# Patient Record
Sex: MALE | Race: WHITE | Employment: FULL TIME | RURAL
[De-identification: names, ages, dates, MRNs, and addresses within clinical notes are randomized per-mention and may not be internally consistent; named-entity substitution may affect disease eponyms.]

---

## 2018-01-30 ENCOUNTER — OFFICE VISIT (OUTPATIENT)
Dept: FAMILY MEDICINE CLINIC | Age: 48
End: 2018-01-30

## 2018-01-30 VITALS
HEART RATE: 59 BPM | TEMPERATURE: 98.7 F | WEIGHT: 252.4 LBS | RESPIRATION RATE: 18 BRPM | BODY MASS INDEX: 34.19 KG/M2 | DIASTOLIC BLOOD PRESSURE: 75 MMHG | SYSTOLIC BLOOD PRESSURE: 117 MMHG | HEIGHT: 72 IN | OXYGEN SATURATION: 96 %

## 2018-01-30 DIAGNOSIS — M25.511 ACUTE PAIN OF RIGHT SHOULDER: Primary | ICD-10-CM

## 2018-01-30 DIAGNOSIS — N13.8 BPH WITH OBSTRUCTION/LOWER URINARY TRACT SYMPTOMS: ICD-10-CM

## 2018-01-30 DIAGNOSIS — M54.12 CERVICAL RADICULOPATHY AT C7: ICD-10-CM
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
## 2018-01-30 DIAGNOSIS — N40.1 BPH WITH OBSTRUCTION/LOWER URINARY TRACT SYMPTOMS: ICD-10-CM

## 2018-01-30 RX ORDER — PREDNISONE 20 MG/1
TABLET ORAL
Qty: 30 TAB | Refills: 0 | Status: SHIPPED | OUTPATIENT
Start: 2018-01-30 | End: 2018-03-29

## 2018-01-30 RX ORDER — FINASTERIDE 5 MG/1
5 TABLET, FILM COATED ORAL DAILY
Qty: 30 TAB | Refills: 5 | Status: SHIPPED | OUTPATIENT
Start: 2018-01-30 | End: 2018-07-19 | Stop reason: SDUPTHER

## 2018-01-30 RX ORDER — NAPROXEN 500 MG/1
500 TABLET ORAL 2 TIMES DAILY WITH MEALS
Qty: 60 TAB | Refills: 5 | Status: SHIPPED | OUTPATIENT
Start: 2018-01-30 | End: 2018-03-29

## 2018-01-30 RX ORDER — TAMSULOSIN HYDROCHLORIDE 0.4 MG/1
0.4 CAPSULE ORAL DAILY
Qty: 30 CAP | Refills: 5 | Status: SHIPPED | OUTPATIENT
Start: 2018-01-30 | End: 2018-07-19 | Stop reason: SDUPTHER

## 2018-01-30 NOTE — PROGRESS NOTES
Asia Meyer is a 41 y.o. male who presents with the following:  Chief Complaint   Patient presents with    Shoulder Pain     right     Cervical Pain    Urinary Frequency       Shoulder Pain    The history is provided by the patient (Patient developed shoulder pain after syncope from dehydration and falling at work landing on the right shoulder on December 5, 2017). The injury mechanism was a fall. The right shoulder is affected. The pain is at a severity of 5/10. The pain is moderate. The pain has been worsening since onset. The pain does not radiate. Pertinent negatives include no tingling. Cervical Pain   The history is provided by the patient (The patient has been having cervical pain since the fall and originally was diagnosed as having a pinched nerve in his neck and placed on prednisone and the neck is now feeling a bit better although he still has pain on the right-hand side). Pertinent negatives include no chest pain, no abdominal pain, no headaches and no shortness of breath. Urinary Frequency    The history is provided by the patient (Patient is been having trouble voiding as well as urinary frequency and urgency and sometimes dribbling before after urination and sometimes not making it.). The problem occurs intermittently. The problem has not changed since onset. Associated symptoms include frequency, hesitancy and urgency. Pertinent negatives include no chills, no sweats, no nausea, no vomiting, no discharge, no hematuria, no flank pain, no penile discharge, no abdominal pain and no back pain. No Known Allergies    Current Outpatient Prescriptions   Medication Sig    finasteride (PROSCAR) 5 mg tablet Take 1 Tab by mouth daily.  tamsulosin (FLOMAX) 0.4 mg capsule Take 1 Cap by mouth daily. 20 minutes after the same meal each day    naproxen (NAPROSYN) 500 mg tablet Take 1 Tab by mouth two (2) times daily (with meals).     predniSONE (DELTASONE) 20 mg tablet 5 tablets day for days 1 through 4, then 4 tablets on day 5, then 3 tablets on day 6, then 2 tablets on day 7, then 1 tablet on day 8. No current facility-administered medications for this visit. Past Medical History:   Diagnosis Date    Acid reflux     Fainting spell     Joint pain     Muscle ache     Muscle pain     Muscle weakness     Sinus problem     Urine troubles        No past surgical history on file. Family History   Problem Relation Age of Onset    No Known Problems Mother     Heart Attack Father     No Known Problems Sister     No Known Problems Brother     No Known Problems Maternal Grandmother     No Known Problems Maternal Grandfather     Heart Failure Paternal Grandmother     Cancer Paternal Grandfather      esophagus    No Known Problems Other        Social History     Social History    Marital status:      Spouse name: Aditya Malik Number of children: N/A    Years of education: N/A     Occupational History          [employer]     Social History Main Topics    Smoking status: Never Smoker    Smokeless tobacco: Never Used    Alcohol use No    Drug use: No    Sexual activity: Yes     Partners: Female     Other Topics Concern     Service No     Social History Narrative    None       Review of Systems   Constitutional: Negative for chills, fever, malaise/fatigue and weight loss. HENT: Negative for congestion, hearing loss, sore throat and tinnitus. Eyes: Negative for blurred vision, pain and discharge. Respiratory: Negative for cough, shortness of breath and wheezing. Cardiovascular: Negative for chest pain, palpitations, orthopnea, claudication and leg swelling. Gastrointestinal: Negative for abdominal pain, constipation, heartburn, nausea and vomiting. Genitourinary: Positive for frequency, hesitancy and urgency. Negative for dysuria, flank pain, hematuria and penile discharge.    Musculoskeletal: Positive for joint pain (Over the [facility] joint on the right) and neck pain. Negative for back pain, falls and myalgias. Skin: Negative for itching and rash. Neurological: Negative for dizziness, tingling, tremors and headaches. Endo/Heme/Allergies: Negative for environmental allergies and polydipsia. Psychiatric/Behavioral: Negative for depression and substance abuse. The patient is not nervous/anxious. Visit Vitals    /75 (BP 1 Location: Left arm, BP Patient Position: Sitting)    Pulse (!) 59    Temp 98.7 °F (37.1 °C) (Oral)    Resp 18    Ht 6' (1.829 m)    Wt 252 lb 6.4 oz (114.5 kg)    SpO2 96%    BMI 34.23 kg/m2     Physical Exam   Constitutional: He is oriented to person, place, and time and well-developed, well-nourished, and in no distress. HENT:   Head: Normocephalic and atraumatic. Nose: Nose normal.   Mouth/Throat: Oropharynx is clear and moist.   Eyes: Conjunctivae and EOM are normal. Pupils are equal, round, and reactive to light. Neck: Normal range of motion. Neck supple. No JVD present. No tracheal deviation present. No thyromegaly present. Cardiovascular: Normal rate, regular rhythm, normal heart sounds and intact distal pulses. Exam reveals no gallop and no friction rub. No murmur heard. Pulmonary/Chest: Effort normal and breath sounds normal. No respiratory distress. He has no wheezes. He has no rales. He exhibits no tenderness. Abdominal: Soft. Bowel sounds are normal. He exhibits no distension and no mass. There is no tenderness. There is no rebound and no guarding. Genitourinary: Rectum normal and penis normal. No discharge found. Genitourinary Comments: Prostate is smooth and enlarged and nontender   Musculoskeletal: Normal range of motion. He exhibits tenderness (Patient is tender over the distal clavicle and the [facility] joint on the right as well as the deltoid bursa on the right). He exhibits no edema.    Patient is tender on the right in the area of the C6 and C7 nerves   Lymphadenopathy: He has no cervical adenopathy. Neurological: He is alert and oriented to person, place, and time. He has normal reflexes. No cranial nerve deficit. He exhibits normal muscle tone. Gait normal. Coordination normal.   The patient originally after the fall had numbness in the third and fourth digits on the right but this is cleared up. Skin: Skin is warm and dry. No rash noted. No erythema. Psychiatric: Mood, memory, affect and judgment normal.   Vitals reviewed. ICD-10-CM ICD-9-CM    1. Acute pain of right shoulder M25.511 719.41 XR SHOULDER RT AP/LAT MIN 2 V      XR CLAVICLE RT      naproxen (NAPROSYN) 500 mg tablet      predniSONE (DELTASONE) 20 mg tablet   2. BPH with obstruction/lower urinary tract symptoms N40.1 600.01 finasteride (PROSCAR) 5 mg tablet    N13.8 599.69 tamsulosin (FLOMAX) 0.4 mg capsule   3. Cervical radiculopathy at C7 M54.12 723.4 naproxen (NAPROSYN) 500 mg tablet      predniSONE (DELTASONE) 20 mg tablet   4. Fall, initial encounter [address] XXXA E888.9 XR SHOULDER RT AP/LAT MIN 2 V      XR CLAVICLE RT      naproxen (NAPROSYN) 500 mg tablet       Orders Placed This Encounter    XR SHOULDER RT AP/LAT MIN 2 V     Standing Status:   Future     Standing Expiration Date:   2/28/2019     Order Specific Question:   Reason for Exam     Answer:   Patient had syncopal episode at work from dehydration falling on his right shoulder and having gradually increasing pain in the area since the fall on December 5, 2017    XR CLAVICLE RT     Standing Status:   Future     Standing Expiration Date:   2/28/2019     Order Specific Question:   Reason for Exam     Answer:   Patient had fall on right shoulder on December 5, 2017 with increasing pain in the distal aspect of the right clavicle and AC joint    finasteride (PROSCAR) 5 mg tablet     Sig: Take 1 Tab by mouth daily. Dispense:  30 Tab     Refill:  5    tamsulosin (FLOMAX) 0.4 mg capsule     Sig: Take 1 Cap by mouth daily.  20 minutes after the same meal each day     Dispense:  30 Cap     Refill:  5    naproxen (NAPROSYN) 500 mg tablet     Sig: Take 1 Tab by mouth two (2) times daily (with meals). Dispense:  60 Tab     Refill:  5    predniSONE (DELTASONE) 20 mg tablet     Si tablets day for days 1 through 4, then 4 tablets on day 5, then 3 tablets on day 6, then 2 tablets on day 7, then 1 tablet on day 8. Dispense:  30 Tab     Refill:  0   I would like to see the patient back in about 4 weeks if things are not improving or sooner if they are worsening so I can get him into physical therapy. I told the patient that this condition will get worse if he does not do his daily exercises at home to stretch everything out and he could freeze his shoulder and as little as 2 weeks. I told the patient that his the was having lower urinary tract symptoms from his prostate and suggested Proscar and tamsulosin but down the road he still might need other medications to help with this.     Follow-up Disposition: Not on Sara Nelson MD

## 2018-01-30 NOTE — MR AVS SNAPSHOT
[address] [place] [address] [phone] Patient: Daria Ott 
MRN: [id number] :1970 Visit Information Date & Time Provider Department Dept. Phone Encounter #  
 2018  4:00 PM Capri Sargent MD [address] [place] [address] [id number] Upcoming Health Maintenance Date Due DTaP/Tdap/Td series (2 - Td) 2028 Allergies as of 2018  Review Complete On: 2018 By: Capri Sargent MD  
 No Known Allergies Current Immunizations  Never Reviewed No immunizations on file. Not reviewed this visit You Were Diagnosed With   
  
 Codes Comments Acute pain of right shoulder    -  Primary ICD-10-CM: M25.511 ICD-9-CM: 719.41   
 BPH with obstruction/lower urinary tract symptoms     ICD-10-CM: N40.1, N13.8 ICD-9-CM: 600.01, 599.69 Cervical radiculopathy at C7     ICD-10-CM: M54.12 
ICD-9-CM: 723.4 Fall, initial encounter     ICD-10-CM: W19. Khoi Caprice ICD-9-CM: E888.9 Vitals BP Pulse Temp Resp Height(growth percentile) Weight(growth percentile) 117/75 (BP 1 Location: Left arm, BP Patient Position: Sitting) (!) 59 98.7 °F (37.1 °C) (Oral) 18 6' (1.829 m) 252 lb 6.4 oz (114.5 kg) SpO2 BMI Smoking Status 96% 34.23 kg/m2 Never Smoker Vitals History BMI and BSA Data Body Mass Index Body Surface Area  
 34.23 kg/m 2 2.41 m 2 Preferred Pharmacy Pharmacy Name Phone [address] [phone] Your Updated Medication List  
  
   
This list is accurate as of: 18  5:53 PM.  Always use your most recent med list.  
  
  
  
  
 finasteride 5 mg tablet Commonly known as:  PROSCAR Take 1 Tab by mouth daily. naproxen 500 mg tablet Commonly known as:  NAPROSYN Take 1 Tab by mouth two (2) times daily (with meals). predniSONE 20 mg tablet Commonly known as:  DELTASONE  
5 tablets day for days 1 through 4, then 4 tablets on day 5, then 3 tablets on day 6, then 2 tablets on day 7, then 1 tablet on day 8.  
  
 tamsulosin 0.4 mg capsule Commonly known as:  FLOMAX Take 1 Cap by mouth daily. 20 minutes after the same meal each day Prescriptions Sent to Pharmacy Refills  
 finasteride (PROSCAR) 5 mg tablet 5 Sig: Take 1 Tab by mouth daily. Class: Normal  
 Pharmacy: [facility] [address] [place] [address] Ph #: [phone] Route: Oral  
 tamsulosin (FLOMAX) 0.4 mg capsule 5 Sig: Take 1 Cap by mouth daily. 20 minutes after the same meal each day Class: Normal  
 Pharmacy: [facility] [address] [place] [address] Ph #: [phone] Route: Oral  
 naproxen (NAPROSYN) 500 mg tablet 5 Sig: Take 1 Tab by mouth two (2) times daily (with meals). Class: Normal  
 Pharmacy: [facility] [address] [place] [address] [address] Ph #: [phone] Route: Oral  
 predniSONE (DELTASONE) 20 mg tablet 0 Si tablets day for days 1 through 4, then 4 tablets on day 5, then 3 tablets on day 6, then 2 tablets on day 7, then 1 tablet on day 8. Class: Normal  
 Pharmacy: [facility] [address] [place] [address] [address] Ph #: [phone] To-Do List   
 2018 Imaging:  XR CLAVICLE RT   
  
 2018 Imaging:  XR SHOULDER RT AP/LAT MIN 2 V Introducing [facility]! Cynthia Cabrales introduces [employer] patient portal. Now you can access parts of your medical record, email your doctor's office, and request medication refills online. 1. In your internet browser, go to [email or web address] 2. Click on the First Time User? Click Here link in the Sign In box. You will see the New Member Sign Up page. 3. Enter your [employer] Access Code exactly as it appears below.  You will not need to use this code after youve completed the sign-up process. If you do not sign up before the expiration date, you must request a new code. · [employer] Access Code: [email or web address] Expires: 4/30/2018  5:53 PM 
 
4. Enter the last four digits of your Social Security Number (xxxx) and Date of Birth (mm/dd/yyyy) as indicated and click Submit. You will be taken to the next sign-up page. 5. Create a [employer] ID. This will be your [employer] login ID and cannot be changed, so think of one that is secure and easy to remember. 6. Create a [employer] password. You can change your password at any time. 7. Enter your Password Reset Question and Answer. This can be used at a later time if you forget your password. 8. Enter your e-mail address. You will receive e-mail notification when new information is available in [address]. 9. Click Sign Up. You can now view and download portions of your medical record. 10. Click the Download Summary menu link to download a portable copy of your medical information. If you have questions, please visit the Frequently Asked Questions section of the [employer] website. Remember, [employer] is NOT to be used for urgent needs. For medical emergencies, dial 911. Now available from your iPhone and Android! Please provide this summary of care documentation to your next provider. Your primary care clinician is listed as Corrinne Round. If you have any questions after today's visit, please call [phone].

## 2018-02-12 ENCOUNTER — TELEPHONE (OUTPATIENT)
Dept: FAMILY MEDICINE CLINIC | Age: 48
End: 2018-02-12

## 2018-02-12 NOTE — TELEPHONE ENCOUNTER
Patient called and stated during his recent office visit you told him if the steroids did not work you would refer him to and ortho doctor. The patient states he is still in pain and wanted to know if you would refer him and he did not have a preference.

## 2018-02-13 NOTE — TELEPHONE ENCOUNTER
He will need to go up to [address]  or down to [place] since Chuck does not do anything but knees and hips

## 2018-02-14 ENCOUNTER — DOCUMENTATION ONLY (OUTPATIENT)
Dept: FAMILY MEDICINE CLINIC | Age: 48
End: 2018-02-14

## 2018-03-13 ENCOUNTER — HOSPITAL ENCOUNTER (OUTPATIENT)
Dept: MRI IMAGING | Age: 48
Discharge: HOME | End: 2018-03-13
Attending: ORTHOPAEDIC SURGERY
Payer: COMMERCIAL

## 2018-03-13 DIAGNOSIS — M75.100 RCT (ROTATOR CUFF TEAR): ICD-10-CM

## 2018-03-13 PROCEDURE — 73221 MRI JOINT UPR EXTREM W/O DYE: CPT

## 2018-04-03 PROBLEM — M75.121 COMPLETE TEAR OF RIGHT ROTATOR CUFF: Status: ACTIVE | Noted: 2018-04-03

## 2018-04-03 PROBLEM — M75.121 COMPLETE TEAR OF RIGHT ROTATOR CUFF: Status: RESOLVED | Noted: 2018-04-03 | Resolved: 2018-04-03

## 2018-07-19 ENCOUNTER — OFFICE VISIT (OUTPATIENT)
Dept: FAMILY MEDICINE CLINIC | Age: 48
End: 2018-07-19

## 2018-07-19 VITALS
RESPIRATION RATE: 18 BRPM | BODY MASS INDEX: 34.98 KG/M2 | DIASTOLIC BLOOD PRESSURE: 84 MMHG | TEMPERATURE: 98.6 F | HEART RATE: 66 BPM | OXYGEN SATURATION: 97 % | HEIGHT: 72 IN | SYSTOLIC BLOOD PRESSURE: 138 MMHG | WEIGHT: 258.25 LBS
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
## 2018-07-19 DIAGNOSIS — E66.01 SEVERE OBESITY (BMI 35.0-39.9): Primary | ICD-10-CM

## 2018-07-19 DIAGNOSIS — N40.1 BPH WITH OBSTRUCTION/LOWER URINARY TRACT SYMPTOMS: ICD-10-CM

## 2018-07-19 DIAGNOSIS — L23.7 POISON IVY DERMATITIS: ICD-10-CM

## 2018-07-19 DIAGNOSIS — M54.12 CERVICAL RADICULOPATHY AT C7: ICD-10-CM

## 2018-07-19 DIAGNOSIS — N13.8 BPH WITH OBSTRUCTION/LOWER URINARY TRACT SYMPTOMS: ICD-10-CM

## 2018-07-19 DIAGNOSIS — M25.511 ACUTE PAIN OF RIGHT SHOULDER: ICD-10-CM

## 2018-07-19 DIAGNOSIS — K21.9 GASTROESOPHAGEAL REFLUX DISEASE WITHOUT ESOPHAGITIS: ICD-10-CM

## 2018-07-19 RX ORDER — FINASTERIDE 5 MG/1
5 TABLET, FILM COATED ORAL DAILY
Qty: 30 TAB | Refills: 5 | Status: SHIPPED | OUTPATIENT
Start: 2018-07-19 | End: 2019-02-04 | Stop reason: SDUPTHER

## 2018-07-19 RX ORDER — PREDNISONE 20 MG/1
TABLET ORAL
Qty: 30 TAB | Refills: 0 | Status: SHIPPED | OUTPATIENT
Start: 2018-07-19 | End: 2018-12-04 | Stop reason: ALTCHOICE

## 2018-07-19 RX ORDER — TAMSULOSIN HYDROCHLORIDE 0.4 MG/1
0.4 CAPSULE ORAL DAILY
Qty: 30 CAP | Refills: 5 | Status: SHIPPED | OUTPATIENT
Start: 2018-07-19 | End: 2019-02-04 | Stop reason: SDUPTHER

## 2018-07-19 RX ORDER — OMEPRAZOLE 40 MG/1
40 CAPSULE, DELAYED RELEASE ORAL DAILY
Qty: 30 CAP | Refills: 2 | Status: SHIPPED | OUTPATIENT
Start: 2018-07-19 | End: 2019-02-04 | Stop reason: SDUPTHER

## 2018-07-19 NOTE — PROGRESS NOTES
1. Have you been to the ER, urgent care clinic since your last visit? Hospitalized since your last visit? No    2. Have you seen or consulted any other health care providers outside of the [address] [place] [address] since your last visit? Include any pap smears or colon screening.  No

## 2018-07-19 NOTE — PROGRESS NOTES
Claudette Parra is a 41 y.o. male who presents with the following:  Chief Complaint   Patient presents with    Poison Ivy/Poison Oak/Poison Sumac Exposure    Benign Prostatic Hypertrophy    GERD    Arthritis       Poison Ivy/Poison Oak/Poison Sumac Exposure    The history is provided by the patient (Patient with poison ivy on his face and over his arms and lower legs). Pertinent negatives include no itching. GERD   The history is provided by the patient (Patient has some reflux which is worsened when he takes an NSAID causing significant heartburn and epigastric pain. ). Associated symptoms include chest pain and abdominal pain. Pertinent negatives include no headaches and no shortness of breath. Arthritis   The history is provided by the patient (Patient is still having radiculopathy from his neck but now bothering both hands. ). Associated symptoms include chest pain and abdominal pain. Pertinent negatives include no headaches and no shortness of breath. Urinary Frequency    The history is provided by the patient (Patient's L UTS is much improved on the Proscar and Flomax which the patient wishes to continue. ). Associated symptoms include frequency and abdominal pain. Pertinent negatives include no chills and no urgency. Nicotine Dependence   The history is provided by the patient (Patient dips chewing tobacco and would like to get off of the habit but he become so grouchy and anxious that he cannot stand himself. ). Associated symptoms include chest pain and abdominal pain. Pertinent negatives include no headaches and no shortness of breath.        Allergies   Allergen Reactions    Adhesive Rash     Dermabond       Current Outpatient Prescriptions   Medication Sig    predniSONE (DELTASONE) 20 mg tablet 5 tablets day for days 1 through 4, then 4 tablets on day 5, then 3 tablets on day 6, then 2 tablets on day 7, then 1 tablet on day 8.    omeprazole (PRILOSEC) 40 mg capsule Take 1 Cap by mouth daily.    tamsulosin (FLOMAX) 0.4 mg capsule Take 1 Cap by mouth daily. 20 minutes after the same meal each day    finasteride (PROSCAR) 5 mg tablet Take 1 Tab by mouth daily.  ibuprofen (MOTRIN) 200 mg tablet Take 200 mg by mouth every six (6) hours as needed for Pain. No current facility-administered medications for this visit. Past Medical History:   Diagnosis Date    Acid reflux     Fainting spell     Joint pain     Muscle ache     Muscle pain     Muscle weakness     Sinus problem     Urine troubles        Past Surgical History:   Procedure Laterality Date    ABDOMEN SURGERY PROC UNLISTED      bilat ing hernia repair with repeat R    ABDOMEN SURGERY PROC UNLISTED      umbilical hernia repair    HX ROTATOR CUFF REPAIR  04/03/2018       Family History   Problem Relation Age of Onset    No Known Problems Mother     Heart Attack Father     No Known Problems Sister     No Known Problems Brother     No Known Problems Maternal Grandmother     No Known Problems Maternal Grandfather     Heart Failure Paternal Grandmother     Cancer Paternal Grandfather      esophagus    No Known Problems Other        Social History     Social History    Marital status:      Spouse name: Judith Andujar Number of children: N/A    Years of education: N/A     Occupational History          [employer]     Social History Main Topics    Smoking status: Never Smoker    Smokeless tobacco: Never Used    Alcohol use No    Drug use: No    Sexual activity: Yes     Partners: Female     Other Topics Concern     Service No     Social History Narrative       Review of Systems   Constitutional: Negative for chills, fever, malaise/fatigue and weight loss. HENT: Negative for congestion, hearing loss, sore throat and tinnitus. Eyes: Negative for blurred vision, pain and discharge. Respiratory: Negative for cough, shortness of breath and wheezing.     Cardiovascular: Positive for chest pain. Negative for palpitations, orthopnea, claudication and leg swelling. Gastrointestinal: Positive for abdominal pain. Negative for constipation and heartburn. Genitourinary: Positive for frequency. Negative for dysuria and urgency. Musculoskeletal: Negative for falls, joint pain and myalgias. Patient is recovering from right rotator cuff surgery and is doing much better. Skin: Negative for itching and rash. Neurological: Negative for dizziness, tingling, tremors and headaches. Endo/Heme/Allergies: Negative for environmental allergies and polydipsia. Psychiatric/Behavioral: Negative for depression and substance abuse. The patient is not nervous/anxious. Visit Vitals    /84    Pulse 66    Temp 98.6 °F (37 °C) (Oral)    Resp 18    Ht 6' (1.829 m)    Wt 258 lb 4 oz (117.1 kg)    SpO2 97%    BMI 35.02 kg/m2     Physical Exam   Constitutional: He is oriented to person, place, and time and well-developed, well-nourished, and in no distress. HENT:   Head: Normocephalic and atraumatic. Nose: Nose normal.   Mouth/Throat: Oropharynx is clear and moist.   Eyes: Conjunctivae and EOM are normal. Pupils are equal, round, and reactive to light. Neck: Normal range of motion. Neck supple. No JVD present. No tracheal deviation present. No thyromegaly present. Cardiovascular: Normal rate, regular rhythm, normal heart sounds and intact distal pulses. Exam reveals no gallop and no friction rub. No murmur heard. Pulmonary/Chest: Effort normal and breath sounds normal. No respiratory distress. He has no wheezes. He has no rales. He exhibits no tenderness. Abdominal: Soft. Bowel sounds are normal. He exhibits no distension and no mass. There is no tenderness. There is no rebound and no guarding. Musculoskeletal: Normal range of motion. He exhibits no edema or tenderness. Lymphadenopathy:     He has no cervical adenopathy.    Neurological: He is alert and oriented to person, place, and time. He has normal reflexes. No cranial nerve deficit. He exhibits normal muscle tone. Gait normal. Coordination normal.   Skin: Skin is warm and dry. Rash (Poison ivy rash on the patient's face up around his eyes and from the shoulder all the way down to his hands on the right and similarly on the left as well as some on the lower legs.) noted. No erythema. Psychiatric: Mood, memory, affect and judgment normal.   Vitals reviewed. ICD-10-CM ICD-9-CM    1. Severe obesity (BMI 35.0-39.9) ([facility]) E66.01 278.01    2. BPH with obstruction/lower urinary tract symptoms N40.1 600.01 tamsulosin (FLOMAX) 0.4 mg capsule    N13.8 599.69 finasteride (PROSCAR) 5 mg tablet   3. Cervical radiculopathy at C7 M54.12 723.4 tamsulosin (FLOMAX) 0.4 mg capsule      finasteride (PROSCAR) 5 mg tablet   4. Poison ivy dermatitis L23.7 692.6 predniSONE (DELTASONE) 20 mg tablet   5. Gastroesophageal reflux disease without esophagitis K21.9 530.81 omeprazole (PRILOSEC) 40 mg capsule   6. Acute pain of right shoulder M25.511 719.41 tamsulosin (FLOMAX) 0.4 mg capsule      finasteride (PROSCAR) 5 mg tablet   7. Fall, initial encounter [address] Daune Koyanagi Q209.2 tamsulosin (FLOMAX) 0.4 mg capsule      finasteride (PROSCAR) 5 mg tablet       Orders Placed This Encounter    predniSONE (DELTASONE) 20 mg tablet     Si tablets day for days 1 through 4, then 4 tablets on day 5, then 3 tablets on day 6, then 2 tablets on day 7, then 1 tablet on day 8. Dispense:  30 Tab     Refill:  0    omeprazole (PRILOSEC) 40 mg capsule     Sig: Take 1 Cap by mouth daily. Dispense:  30 Cap     Refill:  2    tamsulosin (FLOMAX) 0.4 mg capsule     Sig: Take 1 Cap by mouth daily. 20 minutes after the same meal each day     Dispense:  30 Cap     Refill:  5    finasteride (PROSCAR) 5 mg tablet     Sig: Take 1 Tab by mouth daily.      Dispense:  30 Tab     Refill:  5       Follow-up Disposition: Not on Vivienne Dallas MD

## 2018-07-20 ENCOUNTER — TELEPHONE (OUTPATIENT)
Dept: FAMILY MEDICINE CLINIC | Age: 48
End: 2018-07-20

## 2018-07-20 DIAGNOSIS — F41.9 ANXIETY: Primary | ICD-10-CM

## 2018-07-20 RX ORDER — BUPROPION HYDROCHLORIDE 150 MG/1
150 TABLET, EXTENDED RELEASE ORAL 2 TIMES DAILY
Qty: 180 TAB | Refills: 1 | Status: SHIPPED | OUTPATIENT
Start: 2018-07-20 | End: 2018-12-04

## 2018-07-20 NOTE — TELEPHONE ENCOUNTER
Patient was seen yesterday & was suppose to get a med to help him with his chewing tobacco cravings but. nothing was sent in

## 2019-01-14 ENCOUNTER — OFFICE VISIT (OUTPATIENT)
Dept: CARDIOLOGY CLINIC | Age: 49
End: 2019-01-14

## 2019-01-14 VITALS
HEART RATE: 86 BPM | WEIGHT: 280 LBS | BODY MASS INDEX: 37.93 KG/M2 | OXYGEN SATURATION: 96 % | DIASTOLIC BLOOD PRESSURE: 78 MMHG | SYSTOLIC BLOOD PRESSURE: 132 MMHG | HEIGHT: 72 IN | RESPIRATION RATE: 18 BRPM

## 2019-01-14 DIAGNOSIS — N40.1 BPH WITH OBSTRUCTION/LOWER URINARY TRACT SYMPTOMS: ICD-10-CM

## 2019-01-14 DIAGNOSIS — R42 DIZZINESS: ICD-10-CM

## 2019-01-14 DIAGNOSIS — N13.8 BPH WITH OBSTRUCTION/LOWER URINARY TRACT SYMPTOMS: ICD-10-CM

## 2019-01-14 DIAGNOSIS — E66.09 CLASS 2 OBESITY DUE TO EXCESS CALORIES WITHOUT SERIOUS COMORBIDITY IN ADULT, UNSPECIFIED BMI: ICD-10-CM

## 2019-01-14 DIAGNOSIS — R00.1 BRADYCARDIA: Primary | ICD-10-CM

## 2019-01-14 DIAGNOSIS — R55 SYNCOPE, UNSPECIFIED SYNCOPE TYPE: ICD-10-CM

## 2019-01-14 NOTE — PROGRESS NOTES
Verified patient with two patient identifiers. Medications reviewed/approved by Dr. Jovi Finney. A verbal from Dr. Jovi Finney was given to remove any medications that were deleted during the visit. Medication(s) removed: none    Chief Complaint   Patient presents with    Dizziness     New patient evaluation - referred by Arabella Carver NP      1. Have you been to the ER, urgent care clinic since your last visit? Hospitalized since your last visit? new pt    2. Have you seen or consulted any other health care providers outside of the [address] [place] [address] since your last visit? Include any pap smears or colon screening.  New pt

## 2019-01-14 NOTE — PROGRESS NOTES
Kevin Perea is a 57 y.o. male is here for cardiac evaluation--sx of near syncope, dizziness. Episode approx 15 yrs ago (while working at [employer], sweating, dizziness, near syncope. Episode one year ago of syncope--tunnel vision, sweating, dizziness, fell and injured shoulder--seen in ER in [place], w/u in ER, told \"dehydrated\". Has BPH and on flomax and proscar past 6 mos. 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 Seen in f/u by PCP, some weight gain in addition, some ?memory issues, thyroids checked neg. Physically active, works out ([employer], [employer], etc). The patient denies chest pain/ shortness of breath, orthopnea, PND, LE edema, palpitations.        Patient Active Problem List    Diagnosis Date Noted    Near syncope 01/14/2019    Class 2 obesity due to excess calories without serious comorbidity in adult 07/19/2018    BPH with obstruction/lower urinary tract symptoms 01/30/2018    Acute pain of right shoulder 01/30/2018    Cervical radiculopathy at C7 01/30/2018      Sam Isidro MD  Past Medical History:   Diagnosis Date    Acid reflux     Fainting spell     Joint pain     Muscle ache     Muscle pain     Muscle weakness     Sinus problem     Urine troubles       Past Surgical History:   Procedure Laterality Date    ABDOMEN SURGERY PROC UNLISTED      bilat ing hernia repair with repeat R    ABDOMEN SURGERY PROC UNLISTED      umbilical hernia repair    HX ROTATOR CUFF REPAIR  04/03/2018     Allergies   Allergen Reactions    Adhesive Rash     Dermabond      Family History   Problem Relation Age of Onset    No Known Problems Mother     Heart Attack Father     No Known Problems Sister     No Known Problems Brother     No Known Problems Maternal Grandmother     No Known Problems Maternal Grandfather     Heart Failure Paternal Grandmother     Cancer Paternal Grandfather         esophagus    No Known Problems Other       Social History     Socioeconomic History    Marital status:      Spouse name: Tammi Rasmussen Number of children: Not on file    Years of education: Not on file    Highest education level: Not on file   Social Needs    Financial resource strain: Not on file    Food insecurity - worry: Not on file    Food insecurity - inability: Not on file   [employer] needs - medical: Not on file   [email or web address] needs - non-medical: Not on file   Occupational History    Occupation:      Comment: [employer]   Tobacco Use    Smoking status: Never Smoker    Smokeless tobacco: Never Used   Substance and Sexual Activity    Alcohol use: No    Drug use: No    Sexual activity: Yes     Partners: Female   Other Topics Concern     Service No    Blood Transfusions Not Asked    Caffeine Concern Not Asked    Occupational Exposure Not Asked    Hobby Hazards Not Asked    Sleep Concern Not Asked    Stress Concern Not Asked    Weight Concern Not Asked    Special Diet Not Asked    Back Care Not Asked    Exercise Not Asked    Bike Helmet Not Asked    Seat Belt Not Asked    Self-Exams Not Asked   Social History Narrative    Not on file      Current Outpatient Medications   Medication Sig    docosahexanoic acid/epa (FISH OIL PO) Take  by mouth.  EPINEPHrine (EPIPEN) 0.3 mg/0.3 mL injection 0.3 mg by IntraMUSCular route.  omeprazole (PRILOSEC) 40 mg capsule Take 1 Cap by mouth daily.  tamsulosin (FLOMAX) 0.4 mg capsule Take 1 Cap by mouth daily. 20 minutes after the same meal each day    finasteride (PROSCAR) 5 mg tablet Take 1 Tab by mouth daily.  ibuprofen (MOTRIN) 200 mg tablet Take 200 mg by mouth every six (6) hours as needed for Pain. No current facility-administered medications for this visit. Review of Symptoms:    CONST  No weight change. No fever, chills, sweats    ENT No visual changes, URI sx, sore throat    CV  See HPI   RESP  No cough, or sputum, wheezing. Also see HPI   GI  No abdominal pain or change in bowel habits. No heartburn or dysphagia. No melena or rectal bleeding.   No dysuria, urgency, frequency, hematuria   MSKEL  No joint pain, swelling. No muscle pain. SKIN  No rash or lesions. NEURO  No headache, syncope, or seizure. No weakness, loss of sensation, or paresthesias. PSYCH  No low mood or depression  No anxiety. HE/LYMPH  No easy bruising, abnormal bleeding, or enlarged glands. Physical ExamPhysical Exam:    Visit Vitals  /78 (BP 1 Location: Left arm, BP Patient Position: Sitting)   Pulse 86   Resp 18   Ht 6' (1.829 m)   Wt 280 lb (127 kg)   SpO2 96% Comment: ra   BMI 37.97 kg/m²     Extended / Orthostatic Vitals:  Patient Position 2: Supine  BP 2: 140/70  Pulse 2: 80  Patient Position 3: Standing  BP 3: 120/70  Pulse 3: 90    Gen: NAD  HEENT:  PERRL, throat clear  Neck: no adenopathy, no thyromegaly, no JVD   Heart:  Regular,Nl S1S2,  no murmur, gallop or rub.   Lungs:  clear  Abdomen:   Soft, non-tender, bowel sounds are active.   Extremities:  No edema  Pulse: symmetric  Neuro: A&O times 3, No focal neuro deficits    Cardiographics    ECG: NSR 70, NST      Assessment:         Patient Active Problem List    Diagnosis Date Noted    Near syncope 01/14/2019    Class 2 obesity due to excess calories without serious comorbidity in adult 07/19/2018    BPH with obstruction/lower urinary tract symptoms 01/30/2018    Acute pain of right shoulder 01/30/2018    Cervical radiculopathy at C7 01/30/2018     57 y.o. male is here for cardiac evaluation--sx of near syncope, dizziness. Episode approx 15 yrs ago (while working at [employer], sweating, dizziness, near syncope.   Episode one year ago of syncope--tunnel vision, sweating, dizziness, fell and injured shoulder--seen in ER in [place], w/u in ER, told \"dehydrated\". Has BPH and on flomax and proscar past 6 mos. 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 Seen in f/u by PCP, some weight gain in addition, some ?memory issues, thyroids checked neg. Physically active, works out ([employer], [employer], etc). Bradycardia, some \"vasovagal\" components, likely exacerbated by flomax/proscard/dehydration (but had prior to starting these meds)--is mildy orthostatic today. R/o severe sandra/block, structural heart disease, other. Has had Neuro eval with Head CT/CTA, etc.  Does not have features of seizure.       Plan:     Holter monitor x 48 hrs--if neg, ,may need longer event monitor x 2-4 weeks  Echo/doppler  Stress treadmill EKG  F/u after testing to review  Push fluids  May consider flomax/proscar changes if needed  Discussed at length with pt and spouse      Allyn Merrill MD

## 2019-01-17 ENCOUNTER — CLINICAL SUPPORT (OUTPATIENT)
Dept: CARDIOLOGY CLINIC | Age: 49
End: 2019-01-17

## 2019-01-17 DIAGNOSIS — N40.1 BPH WITH OBSTRUCTION/LOWER URINARY TRACT SYMPTOMS: ICD-10-CM

## 2019-01-17 DIAGNOSIS — E66.09 CLASS 2 OBESITY DUE TO EXCESS CALORIES WITHOUT SERIOUS COMORBIDITY IN ADULT, UNSPECIFIED BMI: ICD-10-CM

## 2019-01-17 DIAGNOSIS — R42 DIZZINESS: ICD-10-CM

## 2019-01-17 DIAGNOSIS — N13.8 BPH WITH OBSTRUCTION/LOWER URINARY TRACT SYMPTOMS: ICD-10-CM

## 2019-01-17 DIAGNOSIS — R00.1 BRADYCARDIA: ICD-10-CM

## 2019-01-17 DIAGNOSIS — R55 SYNCOPE, UNSPECIFIED SYNCOPE TYPE: ICD-10-CM

## 2019-01-17 NOTE — PROGRESS NOTES
48 hour Holter monitor only. Verified patient with two patient identifiers. Pt verbalized understanding of its use. Ordering COLTEN Levi  Reason: bradycardia, dizziness, syncope  Start time:  3:02pm  Return date: 1/21/19        No LOS.

## 2019-01-21 ENCOUNTER — DOCUMENTATION ONLY (OUTPATIENT)
Dept: CARDIOLOGY CLINIC | Age: 49
End: 2019-01-21

## 2019-01-29 ENCOUNTER — TELEPHONE (OUTPATIENT)
Dept: CARDIOLOGY CLINIC | Age: 49
End: 2019-01-29

## 2019-01-29 DIAGNOSIS — R42 DIZZINESS: ICD-10-CM

## 2019-01-29 DIAGNOSIS — I49.3 PVC (PREMATURE VENTRICULAR CONTRACTION): ICD-10-CM

## 2019-01-29 DIAGNOSIS — M25.511 ACUTE PAIN OF RIGHT SHOULDER: ICD-10-CM

## 2019-01-29 DIAGNOSIS — M54.12 CERVICAL RADICULOPATHY AT C7: ICD-10-CM

## 2019-01-29 DIAGNOSIS — N13.8 BPH WITH OBSTRUCTION/LOWER URINARY TRACT SYMPTOMS: ICD-10-CM

## 2019-01-29 DIAGNOSIS — I49.1 PREMATURE ATRIAL CONTRACTIONS: ICD-10-CM
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
## 2019-01-29 DIAGNOSIS — R00.1 BRADYCARDIA: Primary | ICD-10-CM

## 2019-01-29 DIAGNOSIS — R61 DIAPHORESIS: ICD-10-CM

## 2019-01-29 DIAGNOSIS — N40.1 BPH WITH OBSTRUCTION/LOWER URINARY TRACT SYMPTOMS: ICD-10-CM

## 2019-01-29 RX ORDER — TAMSULOSIN HYDROCHLORIDE 0.4 MG/1
0.4 CAPSULE ORAL DAILY
Qty: 30 CAP | Refills: 5 | OUTPATIENT
Start: 2019-01-29

## 2019-01-29 RX ORDER — FINASTERIDE 5 MG/1
5 TABLET, FILM COATED ORAL DAILY
Qty: 30 TAB | Refills: 5 | OUTPATIENT
Start: 2019-01-29

## 2019-01-29 NOTE — TELEPHONE ENCOUNTER
----- Message from Claudette Eaton MD sent at 1/28/2019  4:39 PM EST -----  Regarding: Holter, Echo, Stress Treadmill  Call/advise Stress Treadmill test was normally with exercise to 10:24--completely normal.  Echo showed normal pumping function/valves, etc.  Holter monitor did show skipped beats (PAC's, PVC's) and occasional slow HR 30's--no real concerns. If has any further episodes would do longer monitor (Event x 2-4 weeks). Thanks [facility]    Verbal order per Dr. Eh Caba. Order repeated and verified twice. Spoke with the patient. Verified patient with two patient identifiers. Results given and questions answered. Pt wishes to have the order placed for the event monitor and he will call if sx's reoccur/worsen (he knows that ER is the 1st option). Patient verbalized understanding.

## 2019-01-29 NOTE — TELEPHONE ENCOUNTER
Spoke to patients wife Syed Burrows) after verifying two identifiers, informed per provider appointment required for prescription refill request.  Armida Allen acknowledged understanding. Call transferred to [facility] to schedule follow up appointment.

## 2019-02-04 PROBLEM — E66.01 SEVERE OBESITY (HCC): Status: ACTIVE | Noted: 2019-02-04

## 2019-09-09 ENCOUNTER — OFFICE VISIT (OUTPATIENT)
Dept: SURGERY | Age: 49
End: 2019-09-09

## 2019-09-09 VITALS
HEART RATE: 57 BPM | WEIGHT: 264.9 LBS | DIASTOLIC BLOOD PRESSURE: 87 MMHG | HEIGHT: 72 IN | SYSTOLIC BLOOD PRESSURE: 132 MMHG | BODY MASS INDEX: 35.88 KG/M2

## 2019-09-09 DIAGNOSIS — K21.9 GASTROESOPHAGEAL REFLUX DISEASE, ESOPHAGITIS PRESENCE NOT SPECIFIED: Primary | ICD-10-CM

## 2019-09-09 NOTE — PROGRESS NOTES
Tip Molina is a 57 y.o. male who presents today with the following:  Chief Complaint   Patient presents with    GERD       HPI    62-year-old male who presents to us as a referral from [facility] Scru[facility]s for reflux. He states that over the past few months he will wake up at night with vomiting. When questioning him further it sounds like he develops reflux to the back of his throat that induces vomiting. Typically states this will occur 2-3 times a month. He is already on Prilosec 40 mg which he takes 30 minutes before he sleeps. He states during the day he does not feel like he has significant heartburn. He denies any weight gain although he is obese. He does not smoke. He typically will eat dinner at 7:30 at night. He typically goes to sleep by 1030. He drinks about 2 sodas a day and his second soda he drinks after dinner and will finish in the evening. He denies any alcohol use. In terms of the Prilosec that he is taking he states that he feels like its of limited effectiveness although when he does not take it he states that he will have his symptoms nightly. He denies any real abdominal pain with this although he does get what he describes as some swelling in his left upper quadrant. A few years ago at [facility] he was told that he had a CT scan which showed a hiatal hernia. He has had bilateral inguinal hernias repaired with the right recurring and repaired a second time as well as an umbilical hernia in the past.  He also takes Mobic every day for cervical pain as well as ibuprofen 2-3 times a week.       Past Medical History:   Diagnosis Date    Acid reflux     Fainting spell     Joint pain     Muscle ache     Muscle pain     Muscle weakness     Sinus problem     Urine troubles        Past Surgical History:   Procedure Laterality Date    ABDOMEN SURGERY PROC UNLISTED      bilat ing hernia repair with repeat R    ABDOMEN SURGERY PROC UNLISTED      umbilical hernia repair    HX ROTATOR CUFF REPAIR  04/03/2018    HX VASECTOMY  2017       Social History     Socioeconomic History    Marital status:      Spouse name: Raven Negrete Number of children: Not on file    Years of education: Not on file    Highest education level: Not on file   Occupational History    Occupation:      Comment: [employer]   Social Needs    Financial resource strain: Not on file    Food insecurity:     Worry: Not on file     Inability: Not on file   [employer] needs:     Medical: Not on file     Non-medical: Not on file   Tobacco Use    Smoking status: Never Smoker    Smokeless tobacco: Never Used   Substance and Sexual Activity    Alcohol use: No    Drug use: No    Sexual activity: Yes     Partners: Female   Lifestyle    Physical activity:     Days per week: Not on file     Minutes per session: Not on file    Stress: Not on file   Relationships    Social connections:     Talks on phone: Not on file     Gets together: Not on file     Attends [employer]ist service: Not on file     Active member of club or organization: Not on file     Attends meetings of clubs or organizations: Not on file     Relationship status: Not on file    Intimate partner violence:     Fear of current or ex partner: Not on file     Emotionally abused: Not on file     Physically abused: Not on file     Forced sexual activity: Not on file   Other Topics Concern     Service No    Blood Transfusions Not Asked    Caffeine Concern Not Asked    Occupational Exposure Not Asked    Hobby Hazards Not Asked    Sleep Concern Not Asked    Stress Concern Not Asked    Weight Concern Not Asked    Special Diet Not Asked    Back Care Not Asked    Exercise Not Asked    Bike Helmet Not Asked    Seat Belt Not Asked    Self-Exams Not Asked   Social History Narrative    Not on file       Family History   Problem Relation Age of Onset    No Known Problems Mother     Heart Attack Father     No Known Problems Sister     No Known Problems Brother     No Known Problems Maternal Grandmother     No Known Problems Maternal Grandfather     Heart Failure Paternal Grandmother     Cancer Paternal Grandfather         esophagus    No Known Problems Other        Allergies   Allergen Reactions    Hornet Venom Swelling     Carries Epi pen    Adhesive Rash     Dermabond       Current Outpatient Medications   Medication Sig    ketotifen (ZADITOR) 0.025 % (0.035 %) ophthalmic solution Administer 1 Drop to both eyes two (2) times daily as needed (itchy watery eyes).  tamsulosin (FLOMAX) 0.4 mg capsule Take 1 Cap by mouth daily. 20 minutes after the same meal each day    omeprazole (PRILOSEC) 40 mg capsule Take 1 Cap by mouth daily.  finasteride (PROSCAR) 5 mg tablet Take 1 Tab by mouth daily.  albuterol (PROVENTIL HFA, VENTOLIN HFA, PROAIR HFA) 90 mcg/actuation inhaler Take 2 Puffs by inhalation every four (4) hours as needed for Wheezing.  docosahexanoic acid/epa (FISH OIL PO) Take  by mouth. No current facility-administered medications for this visit. The above histories, medications and allergies have been reviewed. Review of Systems   Gastrointestinal: Positive for constipation and vomiting. Genitourinary: Positive for flank pain. Musculoskeletal: Positive for neck pain. Visit Vitals  /87 (BP 1 Location: Left arm, BP Patient Position: Sitting)   Pulse (!) 57   Ht 6' (1.829 m)   Wt 264 lb 14.4 oz (120.2 kg)   BMI 35.93 kg/m²     Physical Exam   Constitutional:   Obese male in good spirits and in no distress   Cardiovascular: Normal rate and regular rhythm. Pulmonary/Chest: Effort normal and breath sounds normal. No respiratory distress. He has no wheezes. He has no rales. Abdominal: Soft. He exhibits no distension. Umbilical scar consistent with surgical history. He does have a little bit of left upper quadrant tenderness but I do not appreciate any masses. 1. Gastroesophageal reflux disease, esophagitis presence not specified  It sounds like he is having nocturnal gastro esophageal reflux disease. What I like to do start with an upper GI. I have encouraged him to start taking the PPI twice a day on a trial basis. Depending on what the upper GI shows will determine if we need to proceed with EGD. Follow-up in approximately 3 to 4 weeks. He is also encouraged to discontinue soda use of possible and to avoid any p.o. intake after dinner. - XR UPPER GI SERIES W ALEJANDRO;  Juana Padron MD

## 2019-09-09 NOTE — PATIENT INSTRUCTIONS

## 2019-09-30 ENCOUNTER — OFFICE VISIT (OUTPATIENT)
Dept: SURGERY | Age: 49
End: 2019-09-30

## 2019-09-30 VITALS
WEIGHT: 264.9 LBS | HEIGHT: 72 IN | HEART RATE: 54 BPM | DIASTOLIC BLOOD PRESSURE: 75 MMHG | SYSTOLIC BLOOD PRESSURE: 126 MMHG | BODY MASS INDEX: 35.88 KG/M2

## 2019-09-30 DIAGNOSIS — K21.9 GASTROESOPHAGEAL REFLUX DISEASE, ESOPHAGITIS PRESENCE NOT SPECIFIED: Primary | ICD-10-CM

## 2019-09-30 NOTE — PATIENT INSTRUCTIONS

## 2019-09-30 NOTE — PROGRESS NOTES
Syeda Flores is a 57 y.o. male who presents today with the following:  Chief Complaint   Patient presents with    Results       HPI    Presents in follow-up after upper GI with barium swallow. There were a few tertiary esophageal contractions that were seen as well as a sliding hiatal hernia with associated gastroesophageal reflux but no evidence of peptic ulcer disease and there was no evidence of any mucosal abnormalities. He has taken his Prilosec twice a day and has stated he is starting to notice some kind of an improvement. It is not entirely resolved but he is making progress. He is also changed his eating habits and is not eating late at night.   Past Medical History:   Diagnosis Date    Acid reflux     Fainting spell     Joint pain     Muscle ache     Muscle pain     Muscle weakness     Sinus problem     Urine troubles        Past Surgical History:   Procedure Laterality Date    ABDOMEN SURGERY PROC UNLISTED      bilat ing hernia repair with repeat R    ABDOMEN SURGERY PROC UNLISTED      umbilical hernia repair    HX ROTATOR CUFF REPAIR  04/03/2018    HX VASECTOMY  2017       Social History     Socioeconomic History    Marital status:      Spouse name: Pilar Bermeo Number of children: Not on file    Years of education: Not on file    Highest education level: Not on file   Occupational History    Occupation:      Comment: [employer]   Social Needs    Financial resource strain: Not on file    Food insecurity:     Worry: Not on file     Inability: Not on file   [employer] needs:     Medical: Not on file     Non-medical: Not on file   Tobacco Use    Smoking status: Never Smoker    Smokeless tobacco: Never Used   Substance and Sexual Activity    Alcohol use: No    Drug use: No    Sexual activity: Yes     Partners: Female   Lifestyle    Physical activity:     Days per week: Not on file     Minutes per session: Not on file    Stress: Not on file Relationships    Social connections:     Talks on phone: Not on file     Gets together: Not on file     Attends Buddhism service: Not on file     Active member of club or organization: Not on file     Attends meetings of clubs or organizations: Not on file     Relationship status: Not on file    Intimate partner violence:     Fear of current or ex partner: Not on file     Emotionally abused: Not on file     Physically abused: Not on file     Forced sexual activity: Not on file   Other Topics Concern     Service No    Blood Transfusions Not Asked    Caffeine Concern Not Asked    Occupational Exposure Not Asked    Hobby Hazards Not Asked    Sleep Concern Not Asked    Stress Concern Not Asked    Weight Concern Not Asked    Special Diet Not Asked    Back Care Not Asked    Exercise Not Asked    Bike Helmet Not Asked    Seat Belt Not Asked    Self-Exams Not Asked   Social History Narrative    Not on file       Family History   Problem Relation Age of Onset    No Known Problems Mother     Heart Attack Father     No Known Problems Sister     No Known Problems Brother     No Known Problems Maternal Grandmother     No Known Problems Maternal Grandfather     Heart Failure Paternal Grandmother     Cancer Paternal Grandfather         esophagus    No Known Problems Other        Allergies   Allergen Reactions    Hornet Venom Swelling     Carries Epi pen    Adhesive Rash     Dermabond       Current Outpatient Medications   Medication Sig    ketotifen (ZADITOR) 0.025 % (0.035 %) ophthalmic solution Administer 1 Drop to both eyes two (2) times daily as needed (itchy watery eyes).  tamsulosin (FLOMAX) 0.4 mg capsule Take 1 Cap by mouth daily. 20 minutes after the same meal each day    omeprazole (PRILOSEC) 40 mg capsule Take 1 Cap by mouth daily.  finasteride (PROSCAR) 5 mg tablet Take 1 Tab by mouth daily.     albuterol (PROVENTIL HFA, VENTOLIN HFA, PROAIR HFA) 90 mcg/actuation inhaler Take 2 Puffs by inhalation every four (4) hours as needed for Wheezing.  docosahexanoic acid/epa (FISH OIL PO) Take  by mouth. No current facility-administered medications for this visit. The above histories, medications and allergies have been reviewed. ROS    Visit Vitals  /75 (BP 1 Location: Left arm, BP Patient Position: Sitting)   Pulse (!) 54   Ht 6' (1.829 m)   Wt 264 lb 14.4 oz (120.2 kg)   BMI 35.93 kg/m²     Physical Exam   Constitutional:   Obese male but in no acute distress       1. Gastroesophageal reflux disease, esophagitis presence not specified  GERD with small sliding hiatal hernia but no evidence of any other acute pathology. We discussed options including the possibility of EGD versus continued twice daily PPI therapy. He would like to continue with the PPI twice daily currently since he is showing some signs of improvement. His symptoms have not entirely resolved. I have explained to him that he can also raise the head of his bed and continue with his dietary changes. What I would like to do is bring him back in 2 months or sooner if he has any problems. We will reassess at that point determine if any further intervention is warranted or needed. Follow-up and Dispositions    · Return in about 2 months (around 2019) for recheck.          Belinda Apgar, MD

## 2019-09-30 NOTE — LETTER
9/30/2019 9:59 AM 
 
Patient:  Tita Sterling  
YOB: 1970 Date of Visit: 9/30/2019 Dear Saintclair Cone, NP 
[address] [facility] [address] VIA In Basket 
 : Thank you for referring Mr. Tita Sterling to me for evaluation/treatment. Below are the relevant portions of my assessment and plan of care. As you are aware he has significant reflux. I obtained an barium swallow and upper GI which showed reflux with a hiatal hernia but no esophageal or gastric mucosal abnormalities. We switched him to twice daily Prilosec and he states he is having considerable improvement but not complete resolution. We discussed options including performing EGD or continuing with twice daily PPI. At this point he like to continue with the twice daily PPI prior to doing any further intervention which I think is reasonable since he is having some improvement. I plan on seeing him back in 2 months. If he fails to improve on the twice daily Prilosec we can either consider switching him to a different PPI or proceeding with EGD. I will keep you appraised of his progress. If you have questions, please do not hesitate to call me. I look forward to following Mr. Nagel along with you.  
 
 
 
Sincerely, 
 
 
Joyce Garcia MD

## 2019-10-30 ENCOUNTER — OFFICE VISIT (OUTPATIENT)
Dept: CARDIOLOGY CLINIC | Age: 49
End: 2019-10-30

## 2019-10-30 VITALS
OXYGEN SATURATION: 96 % | HEIGHT: 72 IN | WEIGHT: 269 LBS | HEART RATE: 69 BPM | BODY MASS INDEX: 36.44 KG/M2 | DIASTOLIC BLOOD PRESSURE: 90 MMHG | SYSTOLIC BLOOD PRESSURE: 122 MMHG | RESPIRATION RATE: 16 BRPM

## 2019-10-30 DIAGNOSIS — R42 DIZZINESS: ICD-10-CM

## 2019-10-30 DIAGNOSIS — N40.1 BPH WITH OBSTRUCTION/LOWER URINARY TRACT SYMPTOMS: ICD-10-CM

## 2019-10-30 DIAGNOSIS — I49.1 PREMATURE ATRIAL CONTRACTIONS: ICD-10-CM

## 2019-10-30 DIAGNOSIS — R00.1 BRADYCARDIA: ICD-10-CM

## 2019-10-30 DIAGNOSIS — R55 SYNCOPE, UNSPECIFIED SYNCOPE TYPE: Primary | ICD-10-CM

## 2019-10-30 DIAGNOSIS — I49.3 PVC (PREMATURE VENTRICULAR CONTRACTION): ICD-10-CM

## 2019-10-30 DIAGNOSIS — N13.8 BPH WITH OBSTRUCTION/LOWER URINARY TRACT SYMPTOMS: ICD-10-CM

## 2019-10-30 NOTE — PROGRESS NOTES
Adali Langley is a 57 y.o. male is here for symptom-based f/u--recurrent syncope. 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 Awoke diaphoretic, apparently vomitted. No chest pain, palpitations prior. At [facility] 10/25/19 --apparent neg w/u in ED. Told to not work or drive until seen/cleared. Seen previously 1/19 here with sx of near syncope, dizziness. Episode approx 15 yrs ago (while working at [employer], sweating, dizziness, near syncope. Episode one year ago of syncope--tunnel vision, sweating, dizziness, fell and injured shoulder--seen in ER in [place], w/u in ER, told \"dehydrated\". Has BPH and on flomax and proscar past 6 mos. 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 with w/u incl labs ok, Head CT, CTA neg; EKG with sinus sandra, otherwise neg. Seen in f/u by PCP, some weight gain in addition, some ?memory issues, thyroids checked neg. Physically active, works out ([employer], [employer], etc). Bradycardia, some \"vasovagal\" components, likely exacerbated by flomax/proscard/dehydration (but had prior to starting these meds)--is mildy orthostatic today. R/o severe sandra/block, structural heart disease, other. Has had Neuro eval with Head CT/CTA, etc.  Does not have features of seizure. Cardiac w/u: Stress Treadmill test was normally with exercise to 10:24--completely normal.  Echo showed normal pumping function/valves, etc.  Holter monitor did show skipped beats (PAC's, PVC's) and occasional slow HR 30's--no real concerns  The patient denies chest pain/ shortness of breath, orthopnea, PND, LE edema, palpitations.        Patient Active Problem List    Diagnosis Date Noted    Gastroesophageal reflux disease 09/09/2019    Severe obesity ([address] 02/04/2019    Near syncope 01/14/2019    Class 2 obesity due to excess calories without serious comorbidity in adult 07/19/2018    BPH with obstruction/lower urinary tract symptoms 01/30/2018    Acute pain of right shoulder 01/30/2018    Cervical radiculopathy at C7 01/30/2018      Noreen Holland NP  Past Medical History:   Diagnosis Date    Acid reflux     Fainting spell     Joint pain     Muscle ache     Muscle pain     Muscle weakness     Sinus problem     Urine troubles       Past Surgical History:   Procedure Laterality Date    ABDOMEN SURGERY PROC UNLISTED      bilat ing hernia repair with repeat R    ABDOMEN SURGERY PROC UNLISTED      umbilical hernia repair    HX ROTATOR CUFF REPAIR  04/03/2018    HX VASECTOMY  2017     Allergies   Allergen Reactions    Hornet Venom Swelling     Carries Epi pen    Adhesive Rash     Dermabond      Family History   Problem Relation Age of Onset    No Known Problems Mother     Heart Attack Father     No Known Problems Sister     No Known Problems Brother     No Known Problems Maternal Grandmother     No Known Problems Maternal Grandfather     Heart Failure Paternal Grandmother     Cancer Paternal Grandfather         esophagus    No Known Problems Other       Social History     Socioeconomic History    Marital status:      Spouse name: Joe Thomas Number of children: Not on file    Years of education: Not on file    Highest education level: Not on file   Occupational History    Occupation:      Comment: [employer]   Social Needs    Financial resource strain: Not on file    Food insecurity:     Worry: Not on file     Inability: Not on file   [employer] needs:     Medical: Not on file     Non-medical: Not on file   Tobacco Use    Smoking status: Never Smoker    Smokeless tobacco: Never Used   Substance and Sexual Activity    Alcohol use: No    Drug use: No    Sexual activity: Yes     Partners: Female   Lifestyle    Physical activity:     Days per week: Not on file     Minutes per session: Not on file    Stress: Not on file   Relationships    Social connections:     Talks on phone: Not on file     Gets together: Not on file     Attends [employer] service: Not on file     Active member of club or organization: Not on file     Attends meetings of clubs or organizations: Not on file     Relationship status: Not on file    Intimate partner violence:     Fear of current or ex partner: Not on file     Emotionally abused: Not on file     Physically abused: Not on file     Forced sexual activity: Not on file   Other Topics Concern     Service No    Blood Transfusions Not Asked    Caffeine Concern Not Asked    Occupational Exposure Not Asked   [employer] Hazards Not Asked    Sleep Concern Not Asked    Stress Concern Not Asked    Weight Concern Not Asked    Special Diet Not Asked    Back Care Not Asked    Exercise Not Asked    Bike Helmet Not Asked   [address] Not Asked    Self-Exams Not Asked   Social History Narrative    Not on file      Current Outpatient Medications   Medication Sig    ketotifen (ZADITOR) 0.025 % (0.035 %) ophthalmic solution Administer 1 Drop to both eyes two (2) times daily as needed (itchy watery eyes).  tamsulosin (FLOMAX) 0.4 mg capsule Take 1 Cap by mouth daily. 20 minutes after the same meal each day    omeprazole (PRILOSEC) 40 mg capsule Take 1 Cap by mouth daily.  finasteride (PROSCAR) 5 mg tablet Take 1 Tab by mouth daily.  albuterol (PROVENTIL HFA, VENTOLIN HFA, PROAIR HFA) 90 mcg/actuation inhaler Take 2 Puffs by inhalation every four (4) hours as needed for Wheezing.  docosahexanoic acid/epa (FISH OIL PO) Take  by mouth. No current facility-administered medications for this visit. Review of Symptoms:    CONST  No weight change. No fever, chills, sweats    ENT No visual changes, URI sx, sore throat    CV  See HPI   RESP  No cough, or sputum, wheezing. Also see HPI   GI  No abdominal pain or change in bowel habits.   No heartburn or dysphagia. No melena or rectal bleeding.   No dysuria, urgency, frequency, hematuria   MSKEL  No joint pain, swelling. No muscle pain. SKIN  No rash or lesions. NEURO  No headache, syncope, or seizure. No weakness, loss of sensation, or paresthesias. PSYCH  No low mood or depression  No anxiety. HE/LYMPH  No easy bruising, abnormal bleeding, or enlarged glands. Physical ExamPhysical Exam:    Visit Vitals  /90 (BP 1 Location: Left arm, BP Patient Position: Sitting)   Pulse 69   Resp 16   Ht 6' (1.829 m)   Wt 269 lb (122 kg)   SpO2 96% Comment: ra   BMI 36.48 kg/m²     Gen: NAD  HEENT:  PERRL, throat clear  Neck: no adenopathy, no thyromegaly, no JVD   Heart:  Regular,Nl S1S2,  no murmur, gallop or rub.   Lungs:  clear  Abdomen:   Soft, non-tender, bowel sounds are active.   Extremities:  No edema  Pulse: symmetric  Neuro: A&O times 3, No focal neuro deficits    Cardiographics    ECG: NSR HR 60, PVC      Labs:   Lab Results   Component Value Date/Time    Sodium 142 02/04/2019 04:22 PM    Potassium 3.9 02/04/2019 04:22 PM    Chloride 104 02/04/2019 04:22 PM    CO2 22 02/04/2019 04:22 PM    Glucose 106 (H) 02/04/2019 04:22 PM    BUN 14 02/04/2019 04:22 PM    Creatinine 0.89 02/04/2019 04:22 PM    BUN/Creatinine ratio 16 02/04/2019 04:22 PM    GFR est  02/04/2019 04:22 PM    GFR est non- 02/04/2019 04:22 PM    Calcium 8.9 02/04/2019 04:22 PM    Bilirubin, total 0.2 02/04/2019 04:22 PM    AST (SGOT) 19 02/04/2019 04:22 PM    Alk.  phosphatase 72 02/04/2019 04:22 PM    Protein, total 6.8 02/04/2019 04:22 PM    Albumin 4.3 02/04/2019 04:22 PM    A-G Ratio 1.7 02/04/2019 04:22 PM    ALT (SGPT) 17 02/04/2019 04:22 PM     No results found for: CPK, CPKX, CPX  Lab Results   Component Value Date/Time    Cholesterol, total 239 (H) 02/04/2019 04:22 PM    HDL Cholesterol 39 (L) 02/04/2019 04:22 PM    LDL, calculated 159 (H) 02/04/2019 04:22 PM    Triglyceride 205 (H) 02/04/2019 04:22 PM     No results found for this or any previous visit. Assessment:         Patient Active Problem List    Diagnosis Date Noted    Gastroesophageal reflux disease 09/09/2019    Severe obesity ([address] 02/04/2019    Near syncope 01/14/2019    Class 2 obesity due to excess calories without serious comorbidity in adult 07/19/2018    BPH with obstruction/lower urinary tract symptoms 01/30/2018    Acute pain of right shoulder 01/30/2018    Cervical radiculopathy at C7 01/30/2018     57 y.o. male is here for symptom-based f/u--recurrent syncope. 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 Awoke diaphoretic, apparently vomitted. No chest pain, palpitations prior. At [facility] 10/25/19 --apparent neg w/u in ED. Told to not work or drive until seen/cleared. Seen previously 1/19 here with sx of near syncope, dizziness. Episode approx 15 yrs ago (while working at [employer], sweating, dizziness, near syncope. Episode one year ago of syncope--tunnel vision, sweating, dizziness, fell and injured shoulder--seen in ER in [place], w/u in ER, told \"dehydrated\". Has BPH and on flomax and proscar past 6 mos. 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 Seen in f/u by PCP, some weight gain in addition, some ?memory issues, thyroids checked neg. Physically active, works out ([employer], [employer], etc). Bradycardia, some \"vasovagal\" components, likely exacerbated by flomax/proscard/dehydration (but had prior to starting these meds)--is mildy orthostatic today. R/o severe sandra/block, structural heart disease, other. Has had Neuro eval with Head CT/CTA, etc.  Does not have features of seizure.  Cardiac w/u: Stress Treadmill test was normally with exercise to 10: 24--completely normal.  Echo showed normal pumping function/valves, etc.  Holter monitor did show skipped beats (PAC's, PVC's) and occasional slow HR 30's--no real concerns      Plan:     Recurrent syncope--flomax may be contributing, vasovagal, other  Event monitor x 30 days  EP referral to Dr. Vini Neal, may need ILR or EPS  Neurology referral  NOT cleared to work or drive at this point until testing completed ([facility], etc)    Machelle Doss MD

## 2019-10-30 NOTE — PATIENT INSTRUCTIONS
You will need to see Dr. Gideon Garcia (electrophysiology). You will be called regarding the appointment. You will need to see neurology (rule out seizure). You will need to call to make that appointment.

## 2019-10-30 NOTE — PROGRESS NOTES
Verified patient with two patient identifiers. Medications reviewed/approved by Dr. Enrico Francisco. A verbal order from Dr. Enrico Francisco was received with [facility] to remove any medications that were deleted during the visit. Medication(s) removed:  None    Chief Complaint   Patient presents with    Dizziness     Follow up post hospital discharge     1. Have you been to the ER, urgent care clinic since your last visit? Hospitalized since your last visit? Yes, [facility] in [facility] for sycnope on 10/25/19.     2. Have you seen or consulted any other health care providers outside of the [address] [place] [address] since your last visit? Include any pap smears or colon screening.  no

## 2019-10-31 ENCOUNTER — OFFICE VISIT (OUTPATIENT)
Dept: NEUROLOGY | Age: 49
End: 2019-10-31

## 2019-10-31 VITALS
SYSTOLIC BLOOD PRESSURE: 130 MMHG | BODY MASS INDEX: 36.84 KG/M2 | HEIGHT: 72 IN | OXYGEN SATURATION: 98 % | WEIGHT: 272 LBS | DIASTOLIC BLOOD PRESSURE: 84 MMHG | HEART RATE: 70 BPM

## 2019-10-31 DIAGNOSIS — R55 SYNCOPE AND COLLAPSE: Primary | ICD-10-CM

## 2019-10-31 DIAGNOSIS — N40.1 BENIGN PROSTATIC HYPERPLASIA WITH URINARY HESITANCY: ICD-10-CM

## 2019-10-31 DIAGNOSIS — I49.8 FLUTTERING HEART: ICD-10-CM

## 2019-10-31 DIAGNOSIS — R39.11 BENIGN PROSTATIC HYPERPLASIA WITH URINARY HESITANCY: ICD-10-CM

## 2019-10-31 NOTE — PATIENT INSTRUCTIONS
A Healthy Lifestyle: Care Instructions  Your Care Instructions    A healthy lifestyle can help you feel good, stay at a healthy weight, and have plenty of energy for both work and play. A healthy lifestyle is something you can share with your whole family. A healthy lifestyle also can lower your risk for serious health problems, such as high blood pressure, heart disease, and diabetes. You can follow a few steps listed below to improve your health and the health of your family. Follow-up care is a key part of your treatment and safety. Be sure to make and go to all appointments, and call your doctor if you are having problems. It's also a good idea to know your test results and keep a list of the medicines you take. How can you care for yourself at home? · Do not eat too much sugar, fat, or fast foods. You can still have dessert and treats now and then. The goal is moderation. · Start small to improve your eating habits. Pay attention to portion sizes, drink less juice and soda pop, and eat more fruits and vegetables. ? Eat a healthy amount of food. A 3-ounce serving of meat, for example, is about the size of a deck of cards. Fill the rest of your plate with vegetables and whole grains. ? Limit the amount of soda and sports drinks you have every day. Drink more water when you are thirsty. ? Eat at least 5 servings of fruits and vegetables every day. It may seem like a lot, but it is not hard to reach this goal. A serving or helping is 1 piece of fruit, 1 cup of vegetables, or 2 cups of leafy, raw vegetables. Have an apple or some carrot sticks as an afternoon snack instead of a candy bar. Try to have fruits and/or vegetables at every meal.  · Make exercise part of your daily routine. You may want to start with simple activities, such as walking, bicycling, or slow swimming. Try to be active 30 to 60 minutes every day. You do not need to do all 30 to 60 minutes all at once.  For example, you can exercise 3 times a day for 10 or 20 minutes. Moderate exercise is safe for most people, but it is always a good idea to talk to your doctor before starting an exercise program.  · Keep moving. Verta Rm the lawn, work in the garden, or R2G. Take the stairs instead of the elevator at work. · If you smoke, quit. People who smoke have an increased risk for heart attack, stroke, cancer, and other lung illnesses. Quitting is hard, but there are ways to boost your chance of quitting tobacco for good. ? Use nicotine gum, patches, or lozenges. ? Ask your doctor about stop-smoking programs and medicines. ? Keep trying. In addition to reducing your risk of diseases in the future, you will notice some benefits soon after you stop using tobacco. If you have shortness of breath or asthma symptoms, they will likely get better within a few weeks after you quit. · Limit how much alcohol you drink. Moderate amounts of alcohol (up to 2 drinks a day for men, 1 drink a day for women) are okay. But drinking too much can lead to liver problems, high blood pressure, and other health problems. Family health  If you have a family, there are many things you can do together to improve your health. · Eat meals together as a family as often as possible. · Eat healthy foods. This includes fruits, vegetables, lean meats and dairy, and whole grains. · Include your family in your fitness plan. Most people think of activities such as jogging or tennis as the way to fitness, but there are many ways you and your family can be more active. Anything that makes you breathe hard and gets your heart pumping is exercise. Here are some tips:  ? Walk to do errands or to take your child to school or the bus.  ? Go for a family bike ride after dinner instead of watching TV. Where can you learn more? Go to http://islverio-donato.info/. Enter X276 in the search box to learn more about \"A Healthy Lifestyle: Care Instructions. \"  Current as of: May 28, 2019  Content Version: 12.2  © 4387-0231 [employer]. Care instructions adapted under license by [employer] (which disclaims liability or warranty for this information). If you have questions about a medical condition or this instruction, always ask your healthcare professional. [address] any warranty or liability for your use of this information.

## 2019-10-31 NOTE — PROGRESS NOTES
NEUROLOGY HISTORY AND PHYSICAL    Name Adali Langley Age 57 y.o. MRN [id number]  1970     Referring Physician: Coral Cage NP      Chief Complaint:  syncope     This is a 57 y.o. Right handed male with a medical history of BPH. 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 When he came too he was unable to focus he felt like he wanted to sleep. Priscilla felt that way for the rest of the evening  He was told that he was able to talk but has no recollection  He does not drink alcohol and does not indulge in illicit drug use. This has happened in the past     Assessment and Plan  1. Syncope  Will get an EEG , carotid doppler  And MRI    2. Heart flutter  Following cardiology    3. BPH  On flomax. Was not on it the first incident of syncope      Patient Allergies  Hornet venom and Adhesive     Current Outpatient Medications   Medication Sig    tamsulosin (FLOMAX) 0.4 mg capsule Take 1 Cap by mouth daily. 20 minutes after the same meal each day    omeprazole (PRILOSEC) 40 mg capsule Take 1 Cap by mouth daily.  finasteride (PROSCAR) 5 mg tablet Take 1 Tab by mouth daily.  docosahexanoic acid/epa (FISH OIL PO) Take  by mouth.  ketotifen (ZADITOR) 0.025 % (0.035 %) ophthalmic solution Administer 1 Drop to both eyes two (2) times daily as needed (itchy watery eyes).  albuterol (PROVENTIL HFA, VENTOLIN HFA, PROAIR HFA) 90 mcg/actuation inhaler Take 2 Puffs by inhalation every four (4) hours as needed for Wheezing. No current facility-administered medications for this visit.         Past Medical History:   Diagnosis Date    Acid reflux     Fainting spell     Joint pain     Muscle ache     Muscle pain     Muscle weakness     Sinus problem     Urine troubles        Social History     Tobacco Use    Smoking status: Never Smoker    Smokeless tobacco: Never Used   Substance Use Topics    Alcohol use: No       Family History   Problem Relation Age of Onset    No Known Problems Mother     Heart Attack Father     No Known Problems Sister     No Known Problems Brother     No Known Problems Maternal Grandmother     No Known Problems Maternal Grandfather     Heart Failure Paternal Grandmother     Cancer Paternal Grandfather         esophagus    No Known Problems Other      Review of Systems   Constitutional: Negative for chills and fever. HENT: Negative for ear pain. Eyes: Negative for pain and discharge. Respiratory: Negative for cough and hemoptysis. Cardiovascular: Negative for chest pain and claudication. Gastrointestinal: Negative for constipation and diarrhea. Genitourinary: Negative for flank pain and hematuria. Musculoskeletal: Positive for myalgias. Negative for back pain. Skin: Negative for itching and rash. Neurological: Positive for dizziness. Negative for headaches. Endo/Heme/Allergies: Negative for environmental allergies. Does not bruise/bleed easily. Psychiatric/Behavioral: Negative for depression and hallucinations. Exam  Visit Vitals  /84   Pulse 70   Ht 6' (1.829 m)   Wt 272 lb (123.4 kg)   SpO2 98%   BMI 36.89 kg/m²      General: Well developed, well nourished. Patient in no apparent distress   Head: Normocephalic, atraumatic, anicteric sclera   Neck Normal ROM, No thyromegally   Lungs:  Clear to auscultation bilaterally, No wheezes or rubs   Cardiac: Regular rate and rhythm with no murmurs. Abd: Bowel sounds were audible. No tenderness on palpation   Ext: No pedal edema   Skin: Supple no rash     NeurologicExam:  Mental Status: Alert and oriented to person place and time   Speech: Fluent no aphasia or dysarthria. Cranial Nerves:  II - XII Intact   Motor:  Full and symmetric strength of upper and lower proximal and distal muscles. Normal bulk and tone.     Reflexes: Deep tendon reflexes 2+/4 and symmetric. Sensory:   Symmetric and intact with no perceived deficits modalities involving small or large fibers. Gait:  Gait is balanced and fluid with normal arm swing. Tremor:   No tremor noted. Cerebellar:  Coordination intact. Neurovascular: No carotid bruits. No JVD       Imaging  MRI Results (most recent):  Results from Hospital Encounter encounter on 03/13/18   MRI SHOULDER RT WO CONT    Narrative EXAM:  MRI SHOULDER RT WO CONT    INDICATION: Right shoulder pain M75.100. Fell December 1, 2018, still with pain  and decreased range of motion. COMPARISON: None    TECHNIQUE: Axial proton density fat-saturated; oblique coronal T1, T2  fat-saturated, and proton density fat-saturated; and oblique sagittal T2  fat-saturated MRI of the right shoulder . The study was performed on an open  configuration 0.7 Klarissa (intermediate) field strength MR imaging system. CONTRAST: None. FINDINGS: A.C. joint: Mild osteoarthrosis. Anterior acromion process type: 2    Bone marrow: Within normal limits. No acute fracture, dislocation, or marrow  replacing process. Joint fluid: Small amounts of fluid in glenohumeral joint and subacromial  subdeltoid bursa. Rotator cuff tendons: Diffuse tendinopathy. Full-thickness tear of anterior  supraspinatus tendon measuring up to 1 cm in AP dimension, with tendon  retraction measuring up to 7-8 mm. Biceps tendon: Anatomically located. Intact origin. No substantial tendinopathy. Muscles: No edema or atrophy. Glenoid labrum: Intact. Glenohumeral joint capsule: within normal limits. Glenohumeral articular cartilage: No substantial arthrosis. Soft tissue mass: None. Impression IMPRESSION: Full-thickness tear of anterior supraspinatus tendon.          Lab Review  Lab Results   Component Value Date/Time    WBC 8.1 03/29/2018 10:35 AM    HCT 46.2 03/29/2018 10:35 AM    HGB 15.9 03/29/2018 10:35 AM    PLATELET 085 03/29/2018 10:35 AM     Lab Results   Component Value Date/Time    Sodium 142 02/04/2019 04:22 PM    Potassium 3.9 02/04/2019 04:22 PM    Chloride 104 02/04/2019 04:22 PM    CO2 22 02/04/2019 04:22 PM    Glucose 106 (H) 02/04/2019 04:22 PM    BUN 14 02/04/2019 04:22 PM    Creatinine 0.89 02/04/2019 04:22 PM    Calcium 8.9 02/04/2019 04:22 PM     Lab Results   Component Value Date/Time    Vitamin B12 634 12/04/2018 09:39 AM     Lab Results   Component Value Date/Time    LDL, calculated 159 (H) 02/04/2019 04:22 PM     Lab Results   Component Value Date/Time    Hemoglobin A1c 5.4 12/04/2018 09:39 AM

## 2019-11-01 ENCOUNTER — CLINICAL SUPPORT (OUTPATIENT)
Dept: CARDIOLOGY CLINIC | Age: 49
End: 2019-11-01

## 2019-11-01 DIAGNOSIS — R55 SYNCOPE, UNSPECIFIED SYNCOPE TYPE: ICD-10-CM

## 2019-11-01 DIAGNOSIS — I49.3 PVC (PREMATURE VENTRICULAR CONTRACTION): ICD-10-CM

## 2019-11-01 DIAGNOSIS — R00.1 BRADYCARDIA: ICD-10-CM

## 2019-11-01 DIAGNOSIS — I49.1 PREMATURE ATRIAL CONTRACTIONS: ICD-10-CM

## 2019-11-01 PROBLEM — I65.23 BILATERAL CAROTID ARTERY STENOSIS: Status: ACTIVE | Noted: 2019-11-01

## 2019-11-12 ENCOUNTER — OFFICE VISIT (OUTPATIENT)
Dept: CARDIOLOGY CLINIC | Age: 49
End: 2019-11-12

## 2019-11-12 VITALS
HEART RATE: 70 BPM | SYSTOLIC BLOOD PRESSURE: 128 MMHG | HEIGHT: 72 IN | OXYGEN SATURATION: 97 % | WEIGHT: 274.6 LBS | DIASTOLIC BLOOD PRESSURE: 78 MMHG | RESPIRATION RATE: 18 BRPM | BODY MASS INDEX: 37.19 KG/M2

## 2019-11-12 DIAGNOSIS — R00.1 BRADYCARDIA: ICD-10-CM

## 2019-11-12 DIAGNOSIS — R55 NEAR SYNCOPE: Primary | ICD-10-CM

## 2019-11-12 DIAGNOSIS — N13.8 BPH WITH OBSTRUCTION/LOWER URINARY TRACT SYMPTOMS: ICD-10-CM

## 2019-11-12 DIAGNOSIS — N40.1 BPH WITH OBSTRUCTION/LOWER URINARY TRACT SYMPTOMS: ICD-10-CM

## 2019-11-12 DIAGNOSIS — E66.01 SEVERE OBESITY (HCC): ICD-10-CM

## 2019-11-12 NOTE — PROGRESS NOTES
1. Have you been to the ER, urgent care clinic since your last visit? Hospitalized since your last visit? No    2. Have you seen or consulted any other health care providers outside of the [address] [place] [address] since your last visit? Include any pap smears or colon screening. No    Chief Complaint   Patient presents with    Dizziness     NP, ref by Dr. Cathi Nielsen. C/O syncope while working. Denied other symptoms. Has EM on. Had EKG here 10-30-19. [employer] Activation    Thank you for requesting access to [employer]. Please follow the instructions below to securely access and download your online medical record. [employer] [facility] allows you to send messages to your doctor, view your test results, renew your prescriptions, schedule appointments, and more. How Do I Sign Up? 1. In your internet browser, go to [email or web address]. 2. Click on the First Time User? Click Here link in the Sign In box. You will see the New Member Sign Up page. 3. Enter your [employer] Access Code exactly as it appears below. You will not need to use this code after youve completed the sign-up process. If you do not sign up before the expiration date, you must request a new code. [employer] Access Code: [unfilled] (This is the date your [employer] access code will )    4. Enter the last four digits of your Social Security Number (xxxx) and Date of Birth (mm/dd/yyyy) as indicated and click Submit. You will be taken to the next sign-up page. 5. Create a [facility] ID. This will be your [facility] [facility] login ID and cannot be changed, so think of one that is secure and easy to remember. 6. Create a [facility] password. You can change your password at any time. 7. Enter your Password Reset Question and Answer. This can be used at a later time if you forget your password. 8. Enter your e-mail address. You will receive e-mail notification when new information is available in [address] 9. Click Sign Up.  You can now view and download portions of your medical record. 10. Click the Download Summary menu link to download a portable copy of your medical information. Additional Information    If you have questions, please visit the Frequently Asked Questions section of the [employer] website at [email or web address] Remember, [employer] is NOT to be used for urgent needs. For medical emergencies, dial 911.

## 2019-11-12 NOTE — PROGRESS NOTES
Subjective: Katelyn Dia is a 57 y.o. male is here for EP consult. He has had two syncopal episodes - the only prodrome was tunnel vision prior. This last episode occurred at work when he was lifting things onto the back of a truck. Woke up in the back of an ambulance. The patient denies chest pain/ shortness of breath, orthopnea, PND, LE edema, palpitations, fatigue.        Patient Active Problem List    Diagnosis Date Noted    Bilateral carotid artery stenosis 11/01/2019    Gastroesophageal reflux disease 09/09/2019    Severe obesity ([address] 02/04/2019    Near syncope 01/14/2019    Class 2 obesity due to excess calories without serious comorbidity in adult 07/19/2018    BPH with obstruction/lower urinary tract symptoms 01/30/2018    Acute pain of right shoulder 01/30/2018    Cervical radiculopathy at C7 01/30/2018      Ann Beverly NP  Past Medical History:   Diagnosis Date    Acid reflux     Fainting spell     Joint pain     Muscle ache     Muscle pain     Muscle weakness     Sinus problem     Urine troubles       Past Surgical History:   Procedure Laterality Date    ABDOMEN SURGERY PROC UNLISTED      bilat ing hernia repair with repeat R    ABDOMEN SURGERY PROC UNLISTED      umbilical hernia repair    HX ROTATOR CUFF REPAIR  04/03/2018    HX VASECTOMY  2017     Allergies   Allergen Reactions    Hornet Venom Swelling     Carries Epi pen    Adhesive Rash     Dermabond      Family History   Problem Relation Age of Onset    No Known Problems Mother     Heart Attack Father     No Known Problems Sister     No Known Problems Brother     No Known Problems Maternal Grandmother     No Known Problems Maternal Grandfather     Heart Failure Paternal Grandmother     Cancer Paternal Grandfather         esophagus    No Known Problems Other     negative for cardiac disease  Social History     Socioeconomic History    Marital status:      Spouse name: Sophia Perez Number of children: Not on file    Years of education: Not on file    Highest education level: Not on file   Occupational History    Occupation:      Comment: [employer]   Tobacco Use    Smoking status: Never Smoker    Smokeless tobacco: Never Used   Substance and Sexual Activity    Alcohol use: No    Drug use: No    Sexual activity: Yes     Partners: Female   Other Topics Concern     Service No     Current Outpatient Medications   Medication Sig    ketotifen (ZADITOR) 0.025 % (0.035 %) ophthalmic solution Administer 1 Drop to both eyes two (2) times daily as needed (itchy watery eyes).  tamsulosin (FLOMAX) 0.4 mg capsule Take 1 Cap by mouth daily. 20 minutes after the same meal each day    omeprazole (PRILOSEC) 40 mg capsule Take 1 Cap by mouth daily.  finasteride (PROSCAR) 5 mg tablet Take 1 Tab by mouth daily.  albuterol (PROVENTIL HFA, VENTOLIN HFA, PROAIR HFA) 90 mcg/actuation inhaler Take 2 Puffs by inhalation every four (4) hours as needed for Wheezing.  docosahexanoic acid/epa (FISH OIL PO) Take  by mouth daily. No current facility-administered medications for this visit. Vitals:    11/12/19 1018   BP: 128/78   Pulse: 70   Resp: 18   SpO2: 97%   Weight: 274 lb 9.6 oz (124.6 kg)   Height: 6' (1.829 m)       I have reviewed the nurses notes, vitals, problem list, allergy list, medical history, family, social history and medications. Review of Symptoms:    General: Pt denies excessive weight gain or loss. Pt is able to conduct ADL's  HEENT: Denies blurred vision, headaches, epistaxis and difficulty swallowing. Respiratory: Denies shortness of breath, GODDARD, wheezing or stridor.   Cardiovascular: Denies precordial pain, palpitations, edema or PND  Gastrointestinal: Denies poor appetite, indigestion, abdominal pain or blood in stool  Urinary: Denies dysuria, pyuria  Musculoskeletal: Denies pain or swelling from muscles or joints  Neurologic: =sycnope as above  Skin: Denies rash, itching or texture change. Psych: Denies depression      Physical Exam:      General: Well developed, in no acute distress. HEENT: Eyes - PERRL, no jvd  Heart:  Normal S1/S2 negative S3 or S4. Regular, no murmur, gallop or rub.   Respiratory: Clear bilaterally x 4, no wheezing or rales  Abdomen:   Soft, non-tender, bowel sounds are active.   Extremities:  No edema, normal cap refill, no cyanosis. Musculoskeletal: No clubbing  Neuro: A&Ox3, speech clear, gait stable. Skin: Skin color is normal. No rashes or lesions. Non diaphoretic  Vascular: 2+ pulses symmetric in all extremities    Cardiographics    Ekg: nsr     Monitor - sandra while sleep, pacs    Echo - nl lvef    Stress test - wnl    No results found for this or any previous visit. Lab Results   Component Value Date/Time    WBC 8.1 03/29/2018 10:35 AM    HGB 15.9 03/29/2018 10:35 AM    HCT 46.2 03/29/2018 10:35 AM    PLATELET 636 84/97/8555 10:35 AM    MCV 86.2 03/29/2018 10:35 AM      Lab Results   Component Value Date/Time    Sodium 142 02/04/2019 04:22 PM    Potassium 3.9 02/04/2019 04:22 PM    Chloride 104 02/04/2019 04:22 PM    CO2 22 02/04/2019 04:22 PM    Glucose 106 (H) 02/04/2019 04:22 PM    BUN 14 02/04/2019 04:22 PM    Creatinine 0.89 02/04/2019 04:22 PM    BUN/Creatinine ratio 16 02/04/2019 04:22 PM    GFR est  02/04/2019 04:22 PM    GFR est non- 02/04/2019 04:22 PM    Calcium 8.9 02/04/2019 04:22 PM    Bilirubin, total 0.2 02/04/2019 04:22 PM    AST (SGOT) 19 02/04/2019 04:22 PM    Alk. phosphatase 72 02/04/2019 04:22 PM    Protein, total 6.8 02/04/2019 04:22 PM    Albumin 4.3 02/04/2019 04:22 PM    A-G Ratio 1.7 02/04/2019 04:22 PM    ALT (SGPT) 17 02/04/2019 04:22 PM         Assessment:     Assessment:        ICD-10-CM ICD-9-CM    1. Near syncope R55 780.2 CANCELED: AMB POC EKG ROUTINE W/ 12 LEADS, INTER & REP   2. Severe obesity ([facility] [address] E66.01 278.01    3.  BPH with obstruction/lower urinary tract symptoms N40.1 600.01     N13.8 599.69    4. Bradycardia R00.1 427.89      No orders of the defined types were placed in this encounter. Plan:   Mr Jennifer Navarro is a pleasant gentleman with syncope with minimal prodrome. His echo demonstrated a nl lvef and his stress test was wnl. His monitor demonstrated sandra while asleep. Will wait for his 4 week event monitor to be completed. If that continues to be negative, he will be a candidate for an ILR to r/o arrhythmia as a cause of his syncope. Will refer to sleep center to r/o OSCAR. F/u post testing. Continue medical management for bph. Thank you for allowing me to participate in Jon Loyd 's care.     Kierra Albarado MD, Tessy Lux

## 2019-11-14 NOTE — PROGRESS NOTES
EVENT monitor only. Verified patient with two patient identifiers. Pt verbalized understanding of its use. Ordering COLTEN Kenyon  Reason:  Syncope, unspecified syncope type Tom[place].Place (ICD-10-CM)]; Bradycardia [R00.1 (ICD-10-CM)]; PVC (premature ventricular contraction) [I49.3 (ICD-10-CM)]; Premature atrial contractions [I49.1 (ICD-10-CM)]    Return date: 11/30/19        No LOS.

## 2019-11-25 ENCOUNTER — OFFICE VISIT (OUTPATIENT)
Dept: SURGERY | Age: 49
End: 2019-11-25

## 2019-11-25 VITALS
TEMPERATURE: 98.2 F | SYSTOLIC BLOOD PRESSURE: 126 MMHG | WEIGHT: 271.8 LBS | RESPIRATION RATE: 18 BRPM | BODY MASS INDEX: 36.82 KG/M2 | DIASTOLIC BLOOD PRESSURE: 87 MMHG | HEIGHT: 72 IN | HEART RATE: 61 BPM | OXYGEN SATURATION: 97 %

## 2019-11-25 DIAGNOSIS — K21.9 GASTROESOPHAGEAL REFLUX DISEASE WITHOUT ESOPHAGITIS: ICD-10-CM

## 2019-11-25 RX ORDER — OMEPRAZOLE 40 MG/1
40 CAPSULE, DELAYED RELEASE ORAL DAILY
Qty: 90 CAP | Refills: 1 | Status: SHIPPED | OUTPATIENT
Start: 2019-11-25 | End: 2019-11-25 | Stop reason: SDUPTHER

## 2019-11-25 RX ORDER — OMEPRAZOLE 40 MG/1
40 CAPSULE, DELAYED RELEASE ORAL 2 TIMES DAILY
Qty: 60 CAP | Refills: 2 | Status: SHIPPED | OUTPATIENT
Start: 2019-11-25 | End: 2020-02-17 | Stop reason: SDUPTHER

## 2019-11-25 NOTE — PROGRESS NOTES
1. Have you been to the ER, urgent care clinic since your last visit? Hospitalized since your last visit? Yes [facility]    2. Have you seen or consulted any other health care providers outside of the [address] [place] [address] since your last visit? Include any pap smears or colon screening.  No

## 2019-11-25 NOTE — PROGRESS NOTES
Elise Sosa is a 57 y.o. male who presents today with the following:  Chief Complaint   Patient presents with    Follow-up       HPI    Presents in 2-month follow-up for his reflux. When he was initially taking his Prilosec twice daily he had complete resolution of his symptoms. About 2 weeks he ran out and did not call for refill. Since then he intermittently does have reflux. Since we last have seen him he has had a syncopal episode and is being worked up by cardiology including event monitors. He is scheduled to see cardiology back in December with the possibility of an additional month of monitoring.   Past Medical History:   Diagnosis Date    Acid reflux     Fainting spell     Joint pain     Muscle ache     Muscle pain     Muscle weakness     Sinus problem     Urine troubles        Past Surgical History:   Procedure Laterality Date    ABDOMEN SURGERY PROC UNLISTED      bilat ing hernia repair with repeat R    ABDOMEN SURGERY PROC UNLISTED      umbilical hernia repair    HX ROTATOR CUFF REPAIR  04/03/2018    HX VASECTOMY  2017       Social History     Socioeconomic History    Marital status:      Spouse name: Diego Sam Number of children: Not on file    Years of education: Not on file    Highest education level: Not on file   Occupational History    Occupation:      Comment: [employer]   Social Needs    Financial resource strain: Not on file    Food insecurity:     Worry: Not on file     Inability: Not on file   [employer] needs:     Medical: Not on file     Non-medical: Not on file   Tobacco Use    Smoking status: Never Smoker    Smokeless tobacco: Never Used   Substance and Sexual Activity    Alcohol use: No    Drug use: No    Sexual activity: Yes     Partners: Female   Lifestyle    Physical activity:     Days per week: Not on file     Minutes per session: Not on file    Stress: Not on file   Relationships    Social connections:     Talks on phone: Not on file     Gets together: Not on file     Attends Moravian service: Not on file     Active member of club or organization: Not on file     Attends meetings of clubs or organizations: Not on file     Relationship status: Not on file    Intimate partner violence:     Fear of current or ex partner: Not on file     Emotionally abused: Not on file     Physically abused: Not on file     Forced sexual activity: Not on file   Other Topics Concern     Service No    Blood Transfusions Not Asked    Caffeine Concern Not Asked    Occupational Exposure Not Asked   Angela Mulch Hazards Not Asked    Sleep Concern Not Asked    Stress Concern Not Asked    Weight Concern Not Asked    Special Diet Not Asked    Back Care Not Asked    Exercise Not Asked    Bike Helmet Not Asked    Seat Belt Not Asked    Self-Exams Not Asked   Social History Narrative    Not on file       Family History   Problem Relation Age of Onset    No Known Problems Mother     Heart Attack Father     No Known Problems Sister     No Known Problems Brother     No Known Problems Maternal Grandmother     No Known Problems Maternal Grandfather     Heart Failure Paternal Grandmother     Cancer Paternal Grandfather         esophagus    No Known Problems Other        Allergies   Allergen Reactions    Hornet Venom Swelling     Carries Epi pen    Adhesive Rash     Dermabond       Current Outpatient Medications   Medication Sig    omeprazole (PRILOSEC) 40 mg capsule Take 1 Cap by mouth two (2) times a day for 90 days.  omeprazole (PRILOSEC) 40 mg capsule Take 1 Cap by mouth daily.  ketotifen (ZADITOR) 0.025 % (0.035 %) ophthalmic solution Administer 1 Drop to both eyes two (2) times daily as needed (itchy watery eyes).  tamsulosin (FLOMAX) 0.4 mg capsule Take 1 Cap by mouth daily. 20 minutes after the same meal each day    finasteride (PROSCAR) 5 mg tablet Take 1 Tab by mouth daily.     albuterol (PROVENTIL HFA, VENTOLIN HFA, PROAIR HFA) 90 mcg/actuation inhaler Take 2 Puffs by inhalation every four (4) hours as needed for Wheezing.  docosahexanoic acid/epa (FISH OIL PO) Take  by mouth daily. No current facility-administered medications for this visit. The above histories, medications and allergies have been reviewed. ROS    Visit Vitals  /87 (BP 1 Location: Left arm, BP Patient Position: Sitting)   Pulse 61   Temp 98.2 °F (36.8 °C) (Oral)   Resp 18   Ht 6' (1.829 m)   Wt 271 lb 12.8 oz (123.3 kg)   SpO2 97%   BMI 36.86 kg/m²     Physical Exam  Constitutional:       General: He is not in acute distress. Appearance: Normal appearance. He is obese. Cardiovascular:      Rate and Rhythm: Normal rate and regular rhythm. Pulmonary:      Effort: Pulmonary effort is normal. No respiratory distress. Breath sounds: Normal breath sounds. Abdominal:      General: Bowel sounds are normal. There is no distension. Palpations: There is no mass. Tenderness: There is no tenderness. Neurological:      Mental Status: He is alert. 1. Gastroesophageal reflux disease without esophagitis  Symptomatic reflux off of the PPI. I have encouraged him to restart his twice daily PPI. We will see him back in February. Hopefully his cardiology work-up will be completed at this point. At that time I think he should have at least a baseline EGD which we will plan on scheduling.  - omeprazole (PRILOSEC) 40 mg capsule; Take 1 Cap by mouth daily. Dispense: 90 Cap;  Refill: 1          Gabe Dang MD

## 2019-11-25 NOTE — PATIENT INSTRUCTIONS

## 2019-12-17 ENCOUNTER — TELEPHONE (OUTPATIENT)
Dept: CARDIOLOGY CLINIC | Age: 49
End: 2019-12-17

## 2019-12-17 NOTE — TELEPHONE ENCOUNTER
----- Message from Humphrey Sherwood MD sent at 12/17/2019  1:01 PM EST -----  Regarding: Event monitor  Advise sinus rhythm with sinus sandra (to 34) to sinus tachycardia, some PAC's--no concerns. Thanks [facility]    Results sent to Meredith Fleischer, NP/Dr. You Barksdale.  Dr. You Barksdale assessed the pt on 11/12 and discussed  ILR to r/o arrhythmia as a cause of his syncope.

## 2020-01-03 NOTE — TELEPHONE ENCOUNTER
Spoke with pt. Verified patient with two identifiers. Discussed findings of event monitor with pt. He is interested in having the ILR as discussed with Dr Kellie Moreno at last office visit. I will check with Aide Camilo NP on Monday and see if we can get this scheduled. I will give Mr Matias Leigh a call back. Pt verbalized understanding and thanked me for the call.

## 2020-01-03 NOTE — TELEPHONE ENCOUNTER
Pts wife called Maryse Shelter). Verified patient with two patient identifiers. Craige Burkitt states that the pt has not received any calls regarding his EM. I sent a message to Rah Nixon NP regarding on 12/17. Advised that she call the [facility] office to discuss the ILR as dicussed with Dr. Nika Harper on 11/12.

## 2020-02-17 ENCOUNTER — OFFICE VISIT (OUTPATIENT)
Dept: SURGERY | Age: 50
End: 2020-02-17

## 2020-02-17 VITALS
DIASTOLIC BLOOD PRESSURE: 77 MMHG | SYSTOLIC BLOOD PRESSURE: 119 MMHG | TEMPERATURE: 97.9 F | HEIGHT: 72 IN | BODY MASS INDEX: 36.7 KG/M2 | WEIGHT: 271 LBS | HEART RATE: 64 BPM | RESPIRATION RATE: 16 BRPM | OXYGEN SATURATION: 98 %

## 2020-02-17 DIAGNOSIS — K21.9 GASTROESOPHAGEAL REFLUX DISEASE, ESOPHAGITIS PRESENCE NOT SPECIFIED: Primary | ICD-10-CM

## 2020-02-17 RX ORDER — OMEPRAZOLE 40 MG/1
40 CAPSULE, DELAYED RELEASE ORAL 2 TIMES DAILY
Qty: 60 CAP | Refills: 2 | Status: SHIPPED | OUTPATIENT
Start: 2020-02-17 | End: 2020-05-17

## 2020-02-17 NOTE — PROGRESS NOTES
Huan More is a 54 y.o. male who presents today with the following:  Chief Complaint   Patient presents with    GERD       HPI    He presents for possible scheduling of his EGD. We initially saw him back in September. He continues to do well in terms of his reflux symptoms as long as he takes the Prilosec 40 mg twice daily. If he skips a dose or takes it just 1 day he has reflux. Since last seen he has been evaluated by cardiology here and in the [place] area. There is some uncertainty if he needs any further work-up by EP. He has not had any more syncopal episodes.       Past Medical History:   Diagnosis Date    Acid reflux     Fainting spell     Joint pain     Muscle ache     Muscle pain     Muscle weakness     Sinus problem     Urine troubles        Past Surgical History:   Procedure Laterality Date    ABDOMEN SURGERY PROC UNLISTED      bilat ing hernia repair with repeat R    ABDOMEN SURGERY PROC UNLISTED      umbilical hernia repair    HX ROTATOR CUFF REPAIR  04/03/2018    HX VASECTOMY  2017       Social History     Socioeconomic History    Marital status:      Spouse name: Cherise Stone Number of children: Not on file    Years of education: Not on file    Highest education level: Not on file   Occupational History    Occupation:      Comment: [employer]   Social Needs    Financial resource strain: Not on file    Food insecurity:     Worry: Not on file     Inability: Not on file   [employer] needs:     Medical: Not on file     Non-medical: Not on file   Tobacco Use    Smoking status: Never Smoker    Smokeless tobacco: Never Used   Substance and Sexual Activity    Alcohol use: No    Drug use: No    Sexual activity: Yes     Partners: Female   Lifestyle    Physical activity:     Days per week: Not on file     Minutes per session: Not on file    Stress: Not on file   Relationships    Social connections:     Talks on phone: Not on file     Gets together: Not on file     Attends Mu-ism service: Not on file     Active member of club or organization: Not on file     Attends meetings of clubs or organizations: Not on file     Relationship status: Not on file    Intimate partner violence:     Fear of current or ex partner: Not on file     Emotionally abused: Not on file     Physically abused: Not on file     Forced sexual activity: Not on file   Other Topics Concern     Service No    Blood Transfusions Not Asked    Caffeine Concern Not Asked    Occupational Exposure Not Asked   [employer] Hazards Not Asked    Sleep Concern Not Asked    Stress Concern Not Asked    Weight Concern Not Asked    Special Diet Not Asked    Back Care Not Asked    Exercise Not Asked    Bike Helmet Not Asked    Seat Belt Not Asked    Self-Exams Not Asked   Social History Narrative    Not on file       Family History   Problem Relation Age of Onset    No Known Problems Mother     Heart Attack Father     No Known Problems Sister     No Known Problems Brother     No Known Problems Maternal Grandmother     No Known Problems Maternal Grandfather     Heart Failure Paternal Grandmother     Cancer Paternal Grandfather         esophagus    No Known Problems Other        Allergies   Allergen Reactions    Hornet Venom Swelling     Carries Epi pen    Adhesive Rash     Dermabond       Current Outpatient Medications   Medication Sig    omeprazole (PRILOSEC) 40 mg capsule Take 1 Cap by mouth two (2) times a day for 90 days.  ketotifen (ZADITOR) 0.025 % (0.035 %) ophthalmic solution Administer 1 Drop to both eyes two (2) times daily as needed (itchy watery eyes).  tamsulosin (FLOMAX) 0.4 mg capsule Take 1 Cap by mouth daily. 20 minutes after the same meal each day    finasteride (PROSCAR) 5 mg tablet Take 1 Tab by mouth daily.     albuterol (PROVENTIL HFA, VENTOLIN HFA, PROAIR HFA) 90 mcg/actuation inhaler Take 2 Puffs by inhalation every four (4) hours as needed for Wheezing.  docosahexanoic acid/epa (FISH OIL PO) Take  by mouth daily. No current facility-administered medications for this visit. The above histories, medications and allergies have been reviewed. ROS    Visit Vitals  /77 (BP 1 Location: Right arm, BP Patient Position: Sitting)   Pulse 64   Temp 97.9 °F (36.6 °C) (Oral)   Resp 16   Ht 6' (1.829 m)   Wt 271 lb (122.9 kg)   SpO2 98%   BMI 36.75 kg/m²     Physical Exam  Constitutional:       Appearance: He is obese. Cardiovascular:      Rate and Rhythm: Normal rate and regular rhythm. Heart sounds: Normal heart sounds. Pulmonary:      Effort: Pulmonary effort is normal.      Breath sounds: Normal breath sounds. Abdominal:      Palpations: Abdomen is soft. There is no mass. Tenderness: There is no abdominal tenderness. Neurological:      Mental Status: He is alert. 1. Gastroesophageal reflux disease, esophagitis presence not specified  Recommend EGD. Risks of the procedure were shared with the patient including the risks of aspiration or esophageal or gastric perforation. All questions were answered to the patient's satisfaction. We will schedule. Follow-up and Dispositions    · Return for post procedure.          Garcia Shore MD

## 2020-02-17 NOTE — PATIENT INSTRUCTIONS
Upper GI Endoscopy: Before Your Procedure What is an upper GI endoscopy? An upper gastrointestinal (or GI) endoscopy is a test that allows your doctor to look at the inside of your esophagus, stomach, and the first part of your small intestine, called the duodenum. The esophagus is the tube that carries food to your stomach. The doctor uses a thin, lighted tube that bends. It is called an endoscope, or scope. The doctor puts the tip of the scope in your mouth and gently moves it down your throat. The scope is a flexible video camera. The doctor looks at a monitor (like a TV set or a computer screen) as he or she moves the scope. A doctor may do this test, which is also called a procedure, to look for ulcers, tumors, infection, or bleeding. It also can be used to look for signs of acid backing up into your esophagus. This is called gastroesophageal reflux disease, or GERD. The doctor can use the scope to take a sample of tissue for study (a biopsy). The doctor also can use the scope to take out growths or stop bleeding. Follow-up care is a key part of your treatment and safety. Be sure to make and go to all appointments, and call your doctor if you are having problems. It's also a good idea to know your test results and keep a list of the medicines you take. What happens before the procedure? 
 Preparing for the procedure 
  · Understand exactly what procedure is planned, along with the risks, benefits, and other options. · Tell your doctors ALL the medicines, vitamins, supplements, and herbal remedies you take. Some of these can increase the risk of bleeding or interact with anesthesia.  
  · If you take blood thinners, such as warfarin (Coumadin), clopidogrel (Plavix), or aspirin, be sure to talk to your doctor. He or she will tell you if you should stop taking these medicines before your procedure. Make sure that you understand exactly what your doctor wants you to do.   · Your doctor will tell you which medicines to take or stop before your procedure. You may need to stop taking certain medicines a week or more before the procedure. So talk to your doctor as soon as you can.  
  · If you have an advance directive, let your doctor know. It may include a living will and a durable power of  for health care. Bring a copy to the hospital. If you don't have one, you may want to prepare one. It lets your doctor and loved ones know your health care wishes. Doctors advise that everyone prepare these papers before any type of surgery or procedure. Procedures can be stressful. This information will help you understand what you can expect. And it will help you safely prepare for your procedure. What happens on the day of the procedure? · Follow the instructions exactly about when to stop eating and drinking. If you don't, your procedure may be canceled. If your doctor told you to take your medicines on the day of the procedure, take them with only a sip of water.  
  · Take a bath or shower before you come in for your procedure. Do not apply lotions, perfumes, deodorants, or nail polish.  
  · Take off all jewelry and piercings. And take out contact lenses, if you wear them.  
 At the hospital or surgery center · Bring a picture ID.  
  · The test may take 15 to 30 minutes.  
  · The doctor may spray medicine on the back of your throat to numb it. You also will get medicine to prevent pain and to relax you.  
  · You will lie on your left side. The doctor will put the scope in your mouth and toward the back of your throat. The doctor will tell you when to swallow. This helps the scope move down your throat. You will be able to breathe normally. The doctor will move the scope down your esophagus into your stomach.  The doctor also may look at the duodenum.  
  · If your doctor wants to take a sample of tissue for a biopsy, he or she may use small surgical tools, which are put into the scope, to cut off some tissue. You will not feel a biopsy, if one is taken. The doctor also can use the tools to stop bleeding or to do other treatments, if needed.  
  · You will stay at the hospital or surgery center for 1 to 2 hours until the medicine you were given wears off. Going home · Be sure you have someone to drive you home. Anesthesia and pain medicine make it unsafe for you to drive.  
  · You will be given more specific instructions about recovering from your procedure. They will cover things like diet, wound care, follow-up care, driving, and getting back to your normal routine. When should you call your doctor? · You have questions or concerns.  
  · You don't understand how to prepare for your procedure.  
  · You become ill before the procedure (such as fever, flu, or a cold).  
  · You need to reschedule or have changed your mind about having the procedure. Where can you learn more? Go to http://silverio-donato.info/. Enter P790 in the search box to learn more about \"Upper GI Endoscopy: Before Your Procedure. \" Current as of: November 7, 2018 Content Version: 12.2 © 7363-7795 [employer]. Care instructions adapted under license by [employer] (which disclaims liability or warranty for this information). If you have questions about a medical condition or this instruction, always ask your healthcare professional. [address] any warranty or liability for your use of this information.

## 2020-02-18 ENCOUNTER — TELEPHONE (OUTPATIENT)
Dept: CARDIOLOGY CLINIC | Age: 50
End: 2020-02-18

## 2020-02-18 NOTE — TELEPHONE ENCOUNTER
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   Please fax [phone]

## 2020-02-18 NOTE — TELEPHONE ENCOUNTER
Per Dr. Catarino Quesada: We can clear for planned EGD at low cardiac risk. Letter created and faxed to Dr. Eliseo García.

## 2020-02-18 NOTE — LETTER
2020 10:16 AM 
 
Mr. Yasmeen Ho : 
[address] [facility] [id number] Dr. Argelia Arshad, Patient is cleared from a cardiac standpoint for planned EGD at low cardiac risk.  
 
 
Sincerely, 
 
 
Ella Velasco MD

## 2020-03-18 ENCOUNTER — OFFICE VISIT (OUTPATIENT)
Dept: SURGERY | Age: 50
End: 2020-03-18

## 2020-03-18 VITALS
SYSTOLIC BLOOD PRESSURE: 133 MMHG | WEIGHT: 271 LBS | TEMPERATURE: 98 F | RESPIRATION RATE: 16 BRPM | DIASTOLIC BLOOD PRESSURE: 95 MMHG | HEIGHT: 72 IN | BODY MASS INDEX: 36.7 KG/M2 | OXYGEN SATURATION: 98 % | HEART RATE: 60 BPM

## 2020-03-18 DIAGNOSIS — K20.0 EOSINOPHILIC ESOPHAGITIS: Primary | ICD-10-CM

## 2020-03-18 NOTE — PROGRESS NOTES
Victor Manuel Garcia is a 54 y.o. male who presents today with the following:  Chief Complaint   Patient presents with    Post OP Follow Up       HPI    Presents in follow-up after his EGD. Findings were shared with him including the findings of suspicion for eosinophilic esophagitis and a hiatal hernia. He also had reactive gastropathy. Currently we are awaiting and addendum pending for Helicobacter testing. He is already taking 40 mg of Nexium twice daily. He does complain of some bloating typically that occurs at night.   Past Medical History:   Diagnosis Date    Acid reflux     Fainting spell     Joint pain     Muscle ache     Muscle pain     Muscle weakness     Sinus problem     Urine troubles        Past Surgical History:   Procedure Laterality Date    ABDOMEN SURGERY PROC UNLISTED      bilat ing hernia repair with repeat R    ABDOMEN SURGERY PROC UNLISTED      umbilical hernia repair    HX ENDOSCOPY  2020    HX ROTATOR CUFF REPAIR  04/03/2018    HX VASECTOMY  2017       Social History     Socioeconomic History    Marital status:      Spouse name: Sherie Felix Number of children: Not on file    Years of education: Not on file    Highest education level: Not on file   Occupational History    Occupation:      Comment: [employer]   Social Needs    Financial resource strain: Not on file    Food insecurity     Worry: Not on file     Inability: Not on file   [employer] needs     Medical: Not on file     Non-medical: Not on file   Tobacco Use    Smoking status: Never Smoker    Smokeless tobacco: Never Used   Substance and Sexual Activity    Alcohol use: No    Drug use: No    Sexual activity: Yes     Partners: Female   Lifestyle    Physical activity     Days per week: Not on file     Minutes per session: Not on file    Stress: Not on file   Relationships    Social connections     Talks on phone: Not on file     Gets together: Not on file     Attends Rastafarian service: Not on file     Active member of club or organization: Not on file     Attends meetings of clubs or organizations: Not on file     Relationship status: Not on file    Intimate partner violence     Fear of current or ex partner: Not on file     Emotionally abused: Not on file     Physically abused: Not on file     Forced sexual activity: Not on file   Other Topics Concern     Service No    Blood Transfusions Not Asked    Caffeine Concern Not Asked    Occupational Exposure Not Asked    Hobby Hazards Not Asked    Sleep Concern Not Asked    Stress Concern Not Asked    Weight Concern Not Asked    Special Diet Not Asked    Back Care Not Asked    Exercise Not Asked    Bike Helmet Not Asked    Seat Belt Not Asked    Self-Exams Not Asked   Social History Narrative    Not on file       Family History   Problem Relation Age of Onset    No Known Problems Mother     Heart Attack Father     No Known Problems Sister     No Known Problems Brother     No Known Problems Maternal Grandmother     No Known Problems Maternal Grandfather     Heart Failure Paternal Grandmother     Cancer Paternal Grandfather         esophagus    No Known Problems Other        Allergies   Allergen Reactions    Hornet Venom Swelling     Carries Epi pen    Adhesive Rash     Dermabond       Current Outpatient Medications   Medication Sig    omeprazole (PRILOSEC) 40 mg capsule Take 1 Cap by mouth two (2) times a day for 90 days.  ketotifen (ZADITOR) 0.025 % (0.035 %) ophthalmic solution Administer 1 Drop to both eyes two (2) times daily as needed (itchy watery eyes).  finasteride (PROSCAR) 5 mg tablet Take 1 Tab by mouth daily.  albuterol (PROVENTIL HFA, VENTOLIN HFA, PROAIR HFA) 90 mcg/actuation inhaler Take 2 Puffs by inhalation every four (4) hours as needed for Wheezing.  docosahexanoic acid/epa (FISH OIL PO) Take  by mouth daily.      No current facility-administered medications for this visit.        The above histories, medications and allergies have been reviewed. ROS    Visit Vitals  BP (!) 133/95 (BP 1 Location: Left arm, BP Patient Position: Sitting)   Pulse 60   Temp 98 °F (36.7 °C)   Resp 16   Ht 6' (1.829 m)   Wt 271 lb (122.9 kg)   SpO2 98%   BMI 36.75 kg/m²     Physical Exam  Constitutional:       Appearance: Normal appearance. 1. Eosinophilic esophagitis  Recommend that he continues the Nexium 40 mg p.o. twice daily. I also recommend consideration of a repeat EGD in 3 months. In terms of the bloating I have recommended that he start Gas-X and take as needed. I also awaiting the return of the  Immunochemistry for evaluation of H. pylori. If he has any worsening symptoms we will be more than happy to see him sooner. Follow-up and Dispositions    · Return in about 2 months (around 5/18/2020) for recheck.          Garcia Shore MD

## 2020-03-18 NOTE — PROGRESS NOTES
1. Have you been to the ER, urgent care clinic since your last visit? Hospitalized since your last visit? No    2. Have you seen or consulted any other health care providers outside of the [address] [place] [address] since your last visit? Include any pap smears or colon screening.  No

## 2020-03-18 NOTE — PATIENT INSTRUCTIONS
Eosinophilic Esophagitis: Care Instructions  Your Care Instructions    Esophagitis (say \"ee-sof-uh-JY-tus\") is irritation or inflammation in the esophagus. This is the tube that carries food from your throat to your stomach. In eosinophilic (say \"ee-uh-sin-uh-FILL-ick\") esophagitis, white blood cells called eosinophils are found where the esophagus is irritated or inflamed. These white blood cells are part of the immune system. The body sends them out in response to an allergic reaction. This has led experts to think that allergies-especially food allergies-may cause this condition. People who have it will often have other types of allergic reactions too, like hay fever, eczema, or asthma. The condition can occur in both children and adults. It's more common in males than in females. It may run in families. Symptoms in adults include trouble swallowing, pain in the chest or upper belly, and heartburn. Children who have the condition may refuse to eat and may not gain weight. They may have nausea, vomiting, or belly pain. Medicines are also used to reduce acid reflux and inflammation. The most common ones are:  Proton pump inhibitors (for adults). These medicines reduce acid reflux, so they may help relieve irritation. Corticosteroids. They can reduce inflammation. Some people need to take them long-term to keep their symptoms under control. Changing your diet may also help to treat the condition. If you have been tested and know your food allergens, you can just avoid those foods. If not, your doctor may suggest a special diet. Follow-up care is a key part of your treatment and safety. Be sure to make and go to all appointments, and call your doctor if you are having problems. It's also a good idea to know your test results and keep a list of the medicines you take. How can you care for yourself at home? · Work with a dietitian if you need a special diet.  He or she can help make sure that you get the nutrients you need to be healthy. · Talk to your doctor about managing allergies. · Be safe with medicines. Take your medicines exactly as prescribed. Call your doctor if you think you are having a problem with your medicine. When should you call for help? Call your doctor now or seek immediate medical care if:  · You have new or worse belly pain. · You vomit  Watch closely for changes in your health, and be sure to contact your doctor if:  · You have new or worse symptoms of reflux. · You have trouble or pain swallowing. · You are losing weight. · You do not get better as expected. Where can you learn more? Go to http://silverio-donato.info/  Enter [id number] in the search box to learn more about \"Eosinophilic Esophagitis: Care Instructions. \"  Current as of: August 11, 2019Content Version: 12.4  © 8015-5705 [employer]. Care instructions adapted under license by [employer] (which disclaims liability or warranty for this information). If you have questions about a medical condition or this instruction, always ask your healthcare professional. [address] any warranty or liability for your use of this information.

## 2020-04-03 ENCOUNTER — OFFICE VISIT (OUTPATIENT)
Dept: PRIMARY CARE CLINIC | Age: 50
End: 2020-04-03

## 2020-04-03 VITALS — HEART RATE: 69 BPM | TEMPERATURE: 98 F | OXYGEN SATURATION: 95 %

## 2020-04-03 DIAGNOSIS — Z20.828 CONTACT WITH AND (SUSPECTED) EXPOSURE TO OTHER VIRAL COMMUNICABLE DISEASES: ICD-10-CM

## 2020-04-03 DIAGNOSIS — J40 BRONCHITIS: Primary | ICD-10-CM

## 2020-04-03 NOTE — PROGRESS NOTES
Blair Martinez is a 54 y.o. male    Seen in Acute Respiratory Clinic    HPI:  Symptoms include cough, chest congestion, and chest heaviness. Seen in ED at [facility], had flu test (NEG), CXR (suggestive of bronchitis). Tx with albuterol and prednisone, was doing better, but a little worse over the past day, started to feel heaviness in lungs, but no severe dyspnea. PMH, SH, Medications/Allergies: reviewed, on chart. Current Outpatient Medications   Medication Sig    omeprazole (PRILOSEC) 40 mg capsule Take 1 Cap by mouth two (2) times a day for 90 days.  ketotifen (ZADITOR) 0.025 % (0.035 %) ophthalmic solution Administer 1 Drop to both eyes two (2) times daily as needed (itchy watery eyes).  finasteride (PROSCAR) 5 mg tablet Take 1 Tab by mouth daily.  albuterol (PROVENTIL HFA, VENTOLIN HFA, PROAIR HFA) 90 mcg/actuation inhaler Take 2 Puffs by inhalation every four (4) hours as needed for Wheezing.  docosahexanoic acid/epa (FISH OIL PO) Take  by mouth daily. No current facility-administered medications for this visit.        ROS:  Constitutional: No fever, chills or abnormal weight loss  Respiratory: No cough, SOB   CV: No chest pain or Palpitations    Visit Vitals  Pulse 69   Temp 98 °F (36.7 °C) (Oral)   SpO2 95%     Wt Readings from Last 3 Encounters:   03/18/20 271 lb (122.9 kg)   03/10/20 271 lb (122.9 kg)   02/17/20 271 lb (122.9 kg)     BP Readings from Last 3 Encounters:   03/18/20 (!) 133/95   03/10/20 133/76   02/17/20 119/77     Physical Examination: General appearance - alert, well appearing, and in no distress  Mental status - alert, oriented to person, place, and time  Eyes - pupils equal and reactive, extraocular eye movements intact  ENT - bilateral external ears and nose normal. Normal lips  Neck - supple, no significant adenopathy, no thyromegaly or mass  Lymphatics - no palpable lymphadenopathy, no hepatosplenomegaly  Chest - clear to auscultation, no wheezes, rales or rhonchi, symmetric air entry  Heart - normal rate, regular rhythm, normal S1, S2, no murmurs, rubs, clicks or gallops  Extremities - peripheral pulses normal, no pedal edema, no clubbing or cyanosis    A/p:  Bronchitis, stable. Con't albuterol, use APAP PRN fever, aching, push fluids.

## 2020-05-04 ENCOUNTER — VIRTUAL VISIT (OUTPATIENT)
Dept: NEUROLOGY | Age: 50
End: 2020-05-04

## 2020-05-04 DIAGNOSIS — R39.11 BENIGN PROSTATIC HYPERPLASIA WITH URINARY HESITANCY: ICD-10-CM

## 2020-05-04 DIAGNOSIS — N40.1 BENIGN PROSTATIC HYPERPLASIA WITH URINARY HESITANCY: ICD-10-CM

## 2020-05-04 DIAGNOSIS — R55 SYNCOPE AND COLLAPSE: Primary | ICD-10-CM

## 2020-05-04 DIAGNOSIS — R00.1 BRADYCARDIA: ICD-10-CM

## 2020-05-04 NOTE — PATIENT INSTRUCTIONS

## 2020-05-04 NOTE — PROGRESS NOTES
Follow-up Visit    Name Montserrat Hawkins Age 54 y.o. MRN [id number]  1970       Chief Complaint: syncope    Patient returns for a follow up visit. Since he was last seen he was implanted with a cardiac monitor. He has not had not had the MRI, the EEG or the dopplers. He has not had any syncope since. However on the holter he bradyed into the 30's. He was supposed to be scheduled for an implanted loop recorder . Assesment and Plan  1. Syncope and collapse  Refer back to cardiology    2. Asthma  Continue prn albuterol    3. Bradycardia  Scheduled for implantable loop recorder. Allergies  Hornet venom and Adhesive     Medications  Current Outpatient Medications   Medication Sig    omeprazole (PRILOSEC) 40 mg capsule Take 1 Cap by mouth two (2) times a day for 90 days.  ketotifen (ZADITOR) 0.025 % (0.035 %) ophthalmic solution Administer 1 Drop to both eyes two (2) times daily as needed (itchy watery eyes).  albuterol (PROVENTIL HFA, VENTOLIN HFA, PROAIR HFA) 90 mcg/actuation inhaler Take 2 Puffs by inhalation every four (4) hours as needed for Wheezing.  docosahexanoic acid/epa (FISH OIL PO) Take  by mouth daily. No current facility-administered medications for this visit. Medical History  Past Medical History:   Diagnosis Date    Acid reflux     Fainting spell     Joint pain     Muscle ache     Muscle pain     Muscle weakness     Sinus problem     Urine troubles        Review of Systems   Eyes: Negative for blurred vision and double vision. Respiratory: Negative for cough and shortness of breath. Cardiovascular: Negative for chest pain, palpitations and orthopnea. Gastrointestinal: Negative for nausea and vomiting. Neurological: Negative for dizziness and headaches. Psychiatric/Behavioral: Negative for depression. The patient is not nervous/anxious. eneral: Well developed, well nourished.  Patient in no apparent distress   Head: Normocephalic, atraumatic, anicteric sclera   Neck Normal ROM   Lungs:  Normal effort   Ext: No pedal edema   Skin: Supple no rash     NeurologicExam:  Mental Status: Alert and oriented to person place and time   Speech: Fluent no aphasia or dysarthria. Cranial Nerves:   II-XII Intact    Motor:  Full and symmetric strength of upper and lower ext . Normal bulk. Sensory:   Symmetric and intact    Gait:  Gait is balanced    Tremor:   No tremor noted. Cerebellar:  Coordination intact. Lab Review  Lab Results   Component Value Date/Time    WBC 8.1 03/29/2018 10:35 AM    HCT 46.2 03/29/2018 10:35 AM    HGB 15.9 03/29/2018 10:35 AM    PLATELET 501 13/15/7947 10:35 AM       Lab Results   Component Value Date/Time    Sodium 142 02/04/2019 04:22 PM    Potassium 3.9 02/04/2019 04:22 PM    Chloride 104 02/04/2019 04:22 PM    CO2 22 02/04/2019 04:22 PM    Glucose 106 (H) 02/04/2019 04:22 PM    BUN 14 02/04/2019 04:22 PM    Creatinine 0.89 02/04/2019 04:22 PM    Calcium 8.9 02/04/2019 04:22 PM           Lab Results   Component Value Date/Time    Hemoglobin A1c 5.4 12/04/2018 09:39 AM        Lab Results   Component Value Date/Time    Vitamin B12 634 12/04/2018 09:39 AM           Lab Results   Component Value Date/Time    Cholesterol, total 239 (H) 02/04/2019 04:22 PM    HDL Cholesterol 39 (L) 02/04/2019 04:22 PM    LDL, calculated 159 (H) 02/04/2019 04:22 PM    VLDL, calculated 41 (H) 02/04/2019 04:22 PM    Triglyceride 205 (H) 02/04/2019 04:22 PM     Yasmine Sauceda was seen by synchronous (real-time) audio-video technology on 05/12/20. Consent:  He and/or his healthcare decision maker is aware that this patient-initiated Telehealth encounter is a billable service, with coverage as determined by his insurance carrier. He is aware that he may receive a bill and has provided verbal consent to proceed: Yes    I was in the office while conducting this encounter.     Pursuant to the emergency declaration under the [facility] Act and the [address] authority and the [employer] Spr[employer] and [employer] Act, this Virtual  Visit was conducted, with patient's consent, to reduce the patient's risk of exposure to COVID-19 and provide continuity of care for an established patient. Services were provided through a video synchronous discussion virtually to substitute for in-person clinic visit.     Med records reviewed cardiology and holter monitoring 20 minutes spent on review total appointment time 45 minutes

## 2020-05-07 ENCOUNTER — VIRTUAL VISIT (OUTPATIENT)
Dept: CARDIOLOGY CLINIC | Age: 50
End: 2020-05-07

## 2020-05-07 ENCOUNTER — TELEPHONE (OUTPATIENT)
Dept: CARDIOLOGY CLINIC | Age: 50
End: 2020-05-07

## 2020-05-07 DIAGNOSIS — R42 DIZZINESS: ICD-10-CM

## 2020-05-07 DIAGNOSIS — E66.01 SEVERE OBESITY (HCC): Primary | ICD-10-CM

## 2020-05-07 DIAGNOSIS — R55 NEAR SYNCOPE: ICD-10-CM

## 2020-05-07 DIAGNOSIS — R00.1 BRADYCARDIA: ICD-10-CM

## 2020-05-07 NOTE — PROGRESS NOTES
Daniela Cardoso MD    Cardiology Telephone Encounter                                                         Pursuant to the emergency declaration under the [facility], [facility] waiver authority and the Coronavirus Preparedness and [employer] Act, this phone visit was conducted, with patient's consent, to reduce the patient's risk of exposure to COVID-19 and provide continuity of care for an established patient. He and/or health care decision maker is aware that that he may receive a bill for this telephone service, depending on his insurance coverage, and has provided verbal consent to proceed. This is a Patient Initiated Episode with an Established Patient who has not had a related appointment within my department in the past 7 days or scheduled within the next 24 hours. HISTORY OF PRESENTING ILLNESS      Jesenia Lemus is a 54 y.o. male evaluated via telephone on 5/7/2020 due to COVID 19 restrictions. Patient unable to participate in Virtual Visit with synchronous audio/visual technology. He is following up for an abnormal monitor that demonstrated profound bradycardia to the 30s while asleep. He has fatigue and dizziness at times during the day. Denies cp/sob.     PCP Provider  Jt Reyes MD  Past Medical History:   Diagnosis Date    Acid reflux     Fainting spell     Joint pain     Muscle ache     Muscle pain     Muscle weakness     Sinus problem     Urine troubles       Past Surgical History:   Procedure Laterality Date    ABDOMEN SURGERY PROC UNLISTED      bilat ing hernia repair with repeat R    ABDOMEN SURGERY PROC UNLISTED      umbilical hernia repair    HX ENDOSCOPY  2020    HX ROTATOR CUFF REPAIR  04/03/2018    HX VASECTOMY  2017     Allergies   Allergen Reactions    Hornet Venom Swelling Carries Epi pen    Adhesive Rash     Dermabond      Family History   Problem Relation Age of Onset    No Known Problems Mother     Heart Attack Father     No Known Problems Sister     No Known Problems Brother     No Known Problems Maternal Grandmother     No Known Problems Maternal Grandfather     Heart Failure Paternal Grandmother     Cancer Paternal Grandfather         esophagus    No Known Problems Other       Current Outpatient Medications   Medication Sig    omeprazole (PRILOSEC) 40 mg capsule Take 1 Cap by mouth two (2) times a day for 90 days.  ketotifen (ZADITOR) 0.025 % (0.035 %) ophthalmic solution Administer 1 Drop to both eyes two (2) times daily as needed (itchy watery eyes).  albuterol (PROVENTIL HFA, VENTOLIN HFA, PROAIR HFA) 90 mcg/actuation inhaler Take 2 Puffs by inhalation every four (4) hours as needed for Wheezing.  docosahexanoic acid/epa (FISH OIL PO) Take  by mouth daily. No current facility-administered medications for this visit. There were no vitals filed for this visit.   Social History     Socioeconomic History    Marital status:      Spouse name: Favian Welsh Number of children: Not on file    Years of education: Not on file    Highest education level: Not on file   Occupational History    Occupation:      Comment: [employer]   Social Needs    Financial resource strain: Not on file    Food insecurity     Worry: Not on file     Inability: Not on file   [employer] needs     Medical: Not on file     Non-medical: Not on file   Tobacco Use    Smoking status: Never Smoker    Smokeless tobacco: Never Used   Substance and Sexual Activity    Alcohol use: No    Drug use: No    Sexual activity: Yes     Partners: Female   Lifestyle    Physical activity     Days per week: Not on file     Minutes per session: Not on file    Stress: Not on file   Relationships    Social connections     Talks on phone: Not on file     Gets together: Not on file     Attends Buddhism service: Not on file     Active member of club or organization: Not on file     Attends meetings of clubs or organizations: Not on file     Relationship status: Not on file    Intimate partner violence     Fear of current or ex partner: Not on file     Emotionally abused: Not on file     Physically abused: Not on file     Forced sexual activity: Not on file   Other Topics Concern     Service No    Blood Transfusions Not Asked    Caffeine Concern Not Asked    Occupational Exposure Not Asked   Veto Risk Hazards Not Asked    Sleep Concern Not Asked    Stress Concern Not Asked    Weight Concern Not Asked    Special Diet Not Asked    Back Care Not Asked    Exercise Not Asked    Bike Helmet Not Asked   [address] [facility] Not Asked    Self-Exams Not Asked   Social History Narrative    Not on file       MEDICATIONS     Current Outpatient Medications   Medication Sig    omeprazole (PRILOSEC) 40 mg capsule Take 1 Cap by mouth two (2) times a day for 90 days.  ketotifen (ZADITOR) 0.025 % (0.035 %) ophthalmic solution Administer 1 Drop to both eyes two (2) times daily as needed (itchy watery eyes).  albuterol (PROVENTIL HFA, VENTOLIN HFA, PROAIR HFA) 90 mcg/actuation inhaler Take 2 Puffs by inhalation every four (4) hours as needed for Wheezing.  docosahexanoic acid/epa (FISH OIL PO) Take  by mouth daily. No current facility-administered medications for this visit. I have reviewed the nurses notes, vitals, problem list, allergy list, medical history, family, social history and medications. REVIEW OF SYMPTOMS     General: Pt denies excessive weight gain or loss. Pt is able to conduct ADL's  HEENT: Denies blurred vision, headaches, hearing loss, epistaxis and difficulty swallowing. Respiratory: Denies cough, congestion, shortness of breath, GODDARD, wheezing or stridor.   Cardiovascular: Denies precordial pain, palpitations, edema or PND  Gastrointestinal: Denies poor appetite, indigestion, abdominal pain or blood in stool  Genitourinary: Denies hematuria, dysuria, increased urinary frequency  Musculoskeletal: Denies joint pain or swelling from muscles or joints  Neurologic: Denies tremor, paresthesias, headache, or sensory motor disturbance  Psychiatric: Denies confusion, insomnia, depression  Integumentray: Denies rash, itching or ulcers. Hematologic: Denies easy bruising, bleeding       PHYSICAL EXAM       Due to this being a telephone encounter a very limited exam was performed  Neurological: A&Ox3, no slurred speech, answering questions appropriately  Respiratory: Non labored, talking in complete sentences, no audible wheeze over the phone        ASSESSMENT        Diagnoses and all orders for this visit:    1. Severe obesity ([address]    2. Dizziness    3. Near syncope    4. Bradycardia        ICD-10-CM ICD-9-CM    1. Severe obesity ([address] E66.01 278.01    2. Dizziness R42 780.4    3. Near syncope R55 780.2    4. Bradycardia R00.1 427.89      No orders of the defined types were placed in this encounter. PLAN   Mr Baldo Majano is having near dizziness during the day. His monitor was significant for profound night bradycardia to the 30s and he has a hx of syncope. He is a candidate for an ILR. I discussed the risks/benefits/alternatives of the procedure with the patient. Risks include (but are not limited to) bleeding, heart block, infection, cva/mi/tamponade/death. The patient understands and agrees to proceed. Thank you for this interesting consultation. Cont med rx for gerd and asthma      TIME (25 Minutes) SPENT RELATED TO THIS PHONE ENCOUNTER    We discussed the expected course, resolution and complications of the diagnosis(es) in detail. Medication risks, benefits, costs, interactions, and alternatives were discussed as indicated.   I advised him to contact the office if his condition worsens, changes or fails to improve as anticipated. He expressed understanding with the diagnosis(es) and plan     FOLLOW-UP   Yessenia Perla will f/u post ILR implant for incision check. Patient was made aware and verbalized understanding that an appointment will be scheduled for them for a virtual visit and/or office visit within the above time frame. Patient understanding his/her responsibility to call and change time/date if he/she so chooses. Thank you, Mer Isidro MD for allowing me to participate in the care of this extraordinarily pleasant male. Please do not hesitate to contact me for further questions/concerns.        Bo Taylor MD

## 2020-05-07 NOTE — PROGRESS NOTES
Chief Complaint   Patient presents with    Follow-up     was told in Dec 2019 he was a candidate for ILR- VV today to follow up     Irregular Heart Beat     feeling flutters on and off - usually after intercourse     Shortness of Breath     upon any exertion     Dizziness     lightheadedness with flutters    does not have monitor to check VS

## 2020-05-07 NOTE — H&P (VIEW-ONLY)
[facility] Yanique Laws MD 
 
Cardiology Telephone Encounter Pursuant to the emergency declaration under the [facility], [facility] waiver authority and the [employer] and [employer] Act, this phone visit was conducted, with patient's consent, to reduce the patient's risk of exposure to COVID-19 and provide continuity of care for an established patient. He and/or health care decision maker is aware that that he may receive a bill for this telephone service, depending on his insurance coverage, and has provided verbal consent to proceed. This is a Patient Initiated Episode with an Established Patient who has not had a related appointment within my department in the past 7 days or scheduled within the next 24 hours. HISTORY OF PRESENTING ILLNESS Ifeanyi Miranda is a 54 y.o. male evaluated via telephone on 5/7/2020 due to COVID 19 restrictions. Patient unable to participate in Virtual Visit with synchronous audio/visual technology. He is following up for an abnormal monitor that demonstrated profound bradycardia to the 30s while asleep. He has fatigue and dizziness at times during the day. Denies cp/sob. PCP Provider Marzena Limon MD 
Past Medical History:  
Diagnosis Date  Acid reflux  Fainting spell  Joint pain  Muscle ache  Muscle pain  Muscle weakness  Sinus problem  Urine troubles Past Surgical History:  
Procedure Laterality Date  ABDOMEN SURGERY PROC UNLISTED    
 bilat ing hernia repair with repeat R  
 ABDOMEN SURGERY PROC UNLISTED    
 umbilical hernia repair [address] [place] [address] ENDOSCOPY  2020  HX ROTATOR CUFF REPAIR  04/03/2018  HX VASECTOMY  2017 Allergies Allergen Reactions  Hornet Venom Swelling Carries Epi pen  Adhesive Rash Dermabond Family History Problem Relation Age of Onset  No Known Problems Mother  Heart Attack Father  No Known Problems Sister  No Known Problems Brother  No Known Problems Maternal Grandmother  No Known Problems Maternal Grandfather  Heart Failure Paternal Grandmother  Cancer Paternal Grandfather   
     esophagus  No Known Problems Other Current Outpatient Medications Medication Sig  
 omeprazole (PRILOSEC) 40 mg capsule Take 1 Cap by mouth two (2) times a day for 90 days.  ketotifen (ZADITOR) 0.025 % (0.035 %) ophthalmic solution Administer 1 Drop to both eyes two (2) times daily as needed (itchy watery eyes).  albuterol (PROVENTIL HFA, VENTOLIN HFA, PROAIR HFA) 90 mcg/actuation inhaler Take 2 Puffs by inhalation every four (4) hours as needed for Wheezing.  docosahexanoic acid/epa (FISH OIL PO) Take  by mouth daily. No current facility-administered medications for this visit. There were no vitals filed for this visit. Social History Socioeconomic History  Marital status:  Spouse name: Estuardo Zepeda  Number of children: Not on file  Years of education: Not on file  Highest education level: Not on file Occupational History  Occupation:  Comment: [employer] Social Needs  Financial resource strain: Not on file  Food insecurity Worry: Not on file Inability: Not on file  Transportation needs Medical: Not on file Non-medical: Not on file Tobacco Use  Smoking status: Never Smoker  Smokeless tobacco: Never Used Substance and Sexual Activity  Alcohol use: No  
 Drug use: No  
 Sexual activity: Yes  
  Partners: Female Lifestyle  Physical activity Days per week: Not on file Minutes per session: Not on file  Stress: Not on file Relationships  Social connections Talks on phone: Not on file Gets together: Not on file Attends Confucianism service: Not on file Active member of club or organization: Not on file Attends meetings of clubs or organizations: Not on file Relationship status: Not on file  Intimate partner violence Fear of current or ex partner: Not on file Emotionally abused: Not on file Physically abused: Not on file Forced sexual activity: Not on file Other Topics Concern   Service No  
 Blood Transfusions Not Asked  Caffeine Concern Not Asked  Occupational Exposure Not Asked [facility] Hazards Not Asked  Sleep Concern Not Asked  Stress Concern Not Asked  Weight Concern Not Asked  Special Diet Not Asked  Back Care Not Asked  Exercise Not Asked  Bike Helmet Not Asked  Seat Belt Not Asked  Self-Exams Not Asked Social History Narrative  Not on file MEDICATIONS Current Outpatient Medications Medication Sig  
 omeprazole (PRILOSEC) 40 mg capsule Take 1 Cap by mouth two (2) times a day for 90 days.  ketotifen (ZADITOR) 0.025 % (0.035 %) ophthalmic solution Administer 1 Drop to both eyes two (2) times daily as needed (itchy watery eyes).  albuterol (PROVENTIL HFA, VENTOLIN HFA, PROAIR HFA) 90 mcg/actuation inhaler Take 2 Puffs by inhalation every four (4) hours as needed for Wheezing.  docosahexanoic acid/epa (FISH OIL PO) Take  by mouth daily. No current facility-administered medications for this visit. I have reviewed the nurses notes, vitals, problem list, allergy list, medical history, family, social history and medications. REVIEW OF SYMPTOMS General: Pt denies excessive weight gain or loss. Pt is able to conduct ADL's HEENT: Denies blurred vision, headaches, hearing loss, epistaxis and difficulty swallowing. Respiratory: Denies cough, congestion, shortness of breath, GODDARD, wheezing or stridor. Cardiovascular: Denies precordial pain, palpitations, edema or PND Gastrointestinal: Denies poor appetite, indigestion, abdominal pain or blood in stool Genitourinary: Denies hematuria, dysuria, increased urinary frequency Musculoskeletal: Denies joint pain or swelling from muscles or joints Neurologic: Denies tremor, paresthesias, headache, or sensory motor disturbance Psychiatric: Denies confusion, insomnia, depression Integumentray: Denies rash, itching or ulcers. Hematologic: Denies easy bruising, bleeding PHYSICAL EXAM  
 
 
Due to this being a telephone encounter a very limited exam was performed Neurological: A&Ox3, no slurred speech, answering questions appropriately Respiratory: Non labored, talking in complete sentences, no audible wheeze over the phone ASSESSMENT Diagnoses and all orders for this visit: 1. Severe obesity ([address] 2. Dizziness 3. Near syncope 4. Bradycardia ICD-10-CM ICD-9-CM 1. Severe obesity ([address] E66.01 278.01   
2. Dizziness R42 780.4 3. Near syncope R55 780.2 4. Bradycardia R00.1 427.89 No orders of the defined types were placed in this encounter. PLAN Mr Jose Tolliver is having near dizziness during the day. His monitor was significant for profound night bradycardia to the 30s and he has a hx of syncope. He is a candidate for an ILR. I discussed the risks/benefits/alternatives of the procedure with the patient. Risks include (but are not limited to) bleeding, heart block, infection, cva/mi/tamponade/death. The patient understands and agrees to proceed. Thank you for this interesting consultation. Cont med rx for gerd and asthma TIME (25 Minutes) SPENT RELATED TO THIS PHONE ENCOUNTER We discussed the expected course, resolution and complications of the diagnosis(es) in detail. Medication risks, benefits, costs, interactions, and alternatives were discussed as indicated.   I advised him to contact the office if his condition worsens, changes or fails to improve as anticipated. He expressed understanding with the diagnosis(es) and plan FOLLOW-UP Marilu Galeazzi will f/u post ILR implant for incision check. Patient was made aware and verbalized understanding that an appointment will be scheduled for them for a virtual visit and/or office visit within the above time frame. Patient understanding his/her responsibility to call and change time/date if he/she so chooses. Thank you, Stevie Isidro MD for allowing me to participate in the care of this extraordinarily pleasant male. Please do not hesitate to contact me for further questions/concerns.   
 
 
Rupal Soares MD

## 2020-05-12 ENCOUNTER — TELEPHONE (OUTPATIENT)
Dept: CARDIOLOGY CLINIC | Age: 50
End: 2020-05-12

## 2020-05-12 NOTE — TELEPHONE ENCOUNTER
Montserrat Coleal denies a personal hx of COVID-19 within last 28 days,  close contact with someone known to be positive within the last 14 days. Pt is not a healthcare worker or . Pt denies living in SNF, adult home or other institutional setting. Pt with no identifiable symptoms/risk. Given instructions for procedure, to include self-isolating for 72 hours prior to procedure. Pt informed and agreed to get COVID-19 test per Dr Danell Dance 72 hours prior to procedure.     No Covid 19 test done ( ILR only )

## 2020-05-15 ENCOUNTER — HOSPITAL ENCOUNTER (OUTPATIENT)
Age: 50
Setting detail: OUTPATIENT SURGERY
Discharge: HOME | End: 2020-05-15
Attending: INTERNAL MEDICINE | Admitting: INTERNAL MEDICINE
Payer: COMMERCIAL

## 2020-05-15 VITALS
HEART RATE: 63 BPM | RESPIRATION RATE: 20 BRPM | DIASTOLIC BLOOD PRESSURE: 61 MMHG | SYSTOLIC BLOOD PRESSURE: 114 MMHG | OXYGEN SATURATION: 98 % | HEIGHT: 72 IN | TEMPERATURE: 98.5 F | BODY MASS INDEX: 36.3 KG/M2 | WEIGHT: 268 LBS

## 2020-05-15 DIAGNOSIS — R55 SYNCOPE, UNSPECIFIED SYNCOPE TYPE: ICD-10-CM

## 2020-05-15 PROCEDURE — 33285 INSJ SUBQ CAR RHYTHM MNTR: CPT | Performed by: INTERNAL MEDICINE

## 2020-05-15 PROCEDURE — C1764 EVENT RECORDER, CARDIAC: HCPCS | Performed by: INTERNAL MEDICINE

## 2020-05-15 PROCEDURE — C1764 EVENT RECORDER, CARDIAC: HCPCS

## 2020-05-15 DEVICE — RECORDER CARD MON REVEAL LINQ MYCARELINK IMPL LINQSYS: Type: IMPLANTABLE DEVICE | Site: CHEST | Status: FUNCTIONAL

## 2020-05-15 NOTE — DISCHARGE INSTRUCTIONS
LOOP RECORDER  REMOVAL DISCHARGE INSTRUCTIONS        Patient ID:  Ifeanyi Miranda  [id number]  68 y.o.  1970    Admit Date: 5/15/2020    Discharge Date: 5/15/2020     Admitting Physician: Yanique Laws MD     Discharge Physician: Yanique Laws MD    Admission Diagnoses:   Syncope, unspecified syncope type [R55]    Discharge Diagnoses: Active Problems:    * No active hospital problems. *      Discharge Condition: Good    Cardiology Procedures this Admission:  S/P Loop removal.     Disposition: home    Reference discharge instructions provided by nursing for diet and activity. Follow-up with ICD/PM clinic in 2 weeks. Call [phone] to make an appointment. DISCHARGE INSTRUCTIONS    1. Call if you experience the following:    A.  A bruise that does not go away   B. Soreness or yellow, green, or brown drainage from the site. C. Any swelling from the site. D. If you have a fever of 100 degrees or higher that lasts for a few days      INCISION CARE       1.  Leave dressing over your site for at least 2 days  2. Leave steri-strips (if present) over your site until they start to fall off.   3.   You may shower after 1 week after as long as your incision isnt submerged or directly sprayed upon until well healed. 4.  For comfort, wear loose fitting clothing. 5.  Ice pack to affected shoulder for first 24 hours. 6.  Report any signs of infection, fever, pain, swelling, redness, oozing, or heat at site especially if these symptoms increase after the first 3 to 4 days. ACTIVITY PRECAUTIONS     1. Avoid rough contact with the implant site.

## 2020-05-15 NOTE — Clinical Note
Left chest clipped, Wet prep solution applied at: 820. Wet prep solution dried at: 823. Wet prep elapsed drying time: 3 mins.

## 2020-05-15 NOTE — PROGRESS NOTES
Pt s/p uneventful Linq insertion. Pt denies c/o. No sedation given. Site soft and dry. VSS. DC instructions reviewed with pt. He verbalizes understanding. SL dc'd without difficulty. Pt escorted to lobby to be dc'd to home with wife.

## 2020-05-15 NOTE — INTERVAL H&P NOTE
Update History & Physical 
 
The Patient's History and Physical of May 7,  
2020 was reviewed with the patient and I examined the patient. There was no change. The surgical site was confirmed by the patient and me. Plan:  The risk, benefits, expected outcome, and alternative to the recommended procedure have been discussed with the patient. Patient understands and wants to proceed with the procedure.  
 
Electronically signed by Josh Stephenson MD on 5/15/2020 at 8:24 AM

## 2020-05-28 ENCOUNTER — CLINICAL SUPPORT (OUTPATIENT)
Dept: CARDIOLOGY CLINIC | Age: 50
End: 2020-05-28

## 2020-05-28 DIAGNOSIS — Z51.89 VISIT FOR WOUND CHECK: ICD-10-CM

## 2020-05-28 DIAGNOSIS — R55 SYNCOPE, UNSPECIFIED SYNCOPE TYPE: ICD-10-CM

## 2020-05-28 DIAGNOSIS — R55 SYNCOPE, UNSPECIFIED SYNCOPE TYPE: Primary | ICD-10-CM

## 2020-05-28 DIAGNOSIS — Z95.818 STATUS POST PLACEMENT OF IMPLANTABLE LOOP RECORDER: ICD-10-CM

## 2020-05-28 DIAGNOSIS — R00.1 BRADYCARDIA: ICD-10-CM

## 2020-05-28 DIAGNOSIS — Z95.0 CARDIAC PACEMAKER IN SITU: Primary | ICD-10-CM

## 2020-05-28 NOTE — PROGRESS NOTES
Shiva Daniels  1970  Charles Isidro MD          Subjective:    Patient is here for 2 week site check and device interrogation after ILR implanted for syncope. The patient denies chest pain or shortness of breath. Denies fevers.      Patient Active Problem List    Diagnosis Date Noted    Eosinophilic esophagitis 81/64/9733    Bilateral carotid artery stenosis 11/01/2019    Gastroesophageal reflux disease 09/09/2019    Severe obesity ([address] 02/04/2019    Near syncope 01/14/2019    Class 2 obesity due to excess calories without serious comorbidity in adult 07/19/2018    BPH with obstruction/lower urinary tract symptoms 01/30/2018    Acute pain of right shoulder 01/30/2018    Cervical radiculopathy at C7 01/30/2018      Past Medical History:   Diagnosis Date    Acid reflux     Fainting spell     Joint pain     Muscle ache     Muscle pain     Muscle weakness     Sinus problem     Urine troubles       Past Surgical History:   Procedure Laterality Date    ABDOMEN SURGERY PROC UNLISTED      bilat ing hernia repair with repeat R    ABDOMEN SURGERY PROC UNLISTED      umbilical hernia repair    HX ENDOSCOPY  2020    HX ROTATOR CUFF REPAIR  04/03/2018    HX VASECTOMY  2017    [place] INSERTION SUBQ CARDIAC RHYTHM MONITOR W/PRGRMG N/A 5/15/2020    LOOP RECORDER INSERT performed by Lara More MD at [facility] CARDIAC CATH LAB     Allergies   Allergen Reactions    Hornet Venom Swelling     Carries Epi pen    Adhesive Rash     Dermabond      Family History   Problem Relation Age of Onset    No Known Problems Mother     Heart Attack Father     No Known Problems Sister     No Known Problems Brother     No Known Problems Maternal Grandmother     No Known Problems Maternal Grandfather     Heart Failure Paternal Grandmother     Cancer Paternal Grandfather         esophagus    No Known Problems Other       Current Outpatient Medications   Medication Sig    ketotifen (Tosha Moreno) 0. 025 % (0.035 %) ophthalmic solution Administer 1 Drop to both eyes two (2) times daily as needed (itchy watery eyes).  albuterol (PROVENTIL HFA, VENTOLIN HFA, PROAIR HFA) 90 mcg/actuation inhaler Take 2 Puffs by inhalation every four (4) hours as needed for Wheezing.  docosahexanoic acid/epa (FISH OIL PO) Take  by mouth daily. No current facility-administered medications for this visit. Review of Systems:    General: Pt denies excessive weight gain or loss. Pt is able to conduct ADL's  Respiratory: Denies shortness of breath, GODDARD, wheezing or stridor. Cardiovascular: Denies precordial pain, palpitations, edema or PND        Physical ExamPhysical Exam:      General: Well developed, in no acute distress. .  Chest: left of sternum, centreal site approximated well, steri-strips intact  Neuro: A&Ox3, speech clear, gait stable. Assessment:   Assessment:     ICD-10-CM ICD-9-CM    1. Syncope, unspecified syncope type R55 780.2    2. Status post placement of implantable loop recorder Z95.818 V45.09    3. Visit for wound check Z51.89 V58.89         Plan:     Patient feels well following implant of ILR. Left of sternum chest site approximated well, no discharge. No fever. No erythema or heat. Follow up as planned in 1year with home monitoring.      Evonne Shelton, ANP

## 2020-06-17 ENCOUNTER — OFFICE VISIT (OUTPATIENT)
Dept: CARDIOLOGY CLINIC | Age: 50
End: 2020-06-17

## 2020-06-17 DIAGNOSIS — R55 SYNCOPE, UNSPECIFIED SYNCOPE TYPE: Primary | ICD-10-CM

## 2020-06-17 DIAGNOSIS — Z45.09 ENCOUNTER FOR LOOP RECORDER CHECK: ICD-10-CM

## 2020-08-13 PROBLEM — M25.511 ACUTE PAIN OF RIGHT SHOULDER: Status: RESOLVED | Noted: 2018-01-30 | Resolved: 2020-08-13

## 2020-08-17 ENCOUNTER — OFFICE VISIT (OUTPATIENT)
Dept: CARDIOLOGY CLINIC | Age: 50
End: 2020-08-17
Payer: COMMERCIAL

## 2020-08-17 DIAGNOSIS — R55 SYNCOPE, UNSPECIFIED SYNCOPE TYPE: Primary | ICD-10-CM

## 2020-08-17 DIAGNOSIS — Z45.09 ENCOUNTER FOR LOOP RECORDER CHECK: ICD-10-CM

## 2020-08-17 PROCEDURE — 93298 REM INTERROG DEV EVAL SCRMS: CPT | Performed by: INTERNAL MEDICINE

## 2020-09-01 ENCOUNTER — OFFICE VISIT (OUTPATIENT)
Dept: CARDIOLOGY CLINIC | Age: 50
End: 2020-09-01
Payer: COMMERCIAL

## 2020-09-01 VITALS
HEART RATE: 57 BPM | DIASTOLIC BLOOD PRESSURE: 100 MMHG | HEIGHT: 72 IN | OXYGEN SATURATION: 99 % | RESPIRATION RATE: 18 BRPM | SYSTOLIC BLOOD PRESSURE: 138 MMHG | WEIGHT: 277 LBS | BODY MASS INDEX: 37.52 KG/M2

## 2020-09-01 DIAGNOSIS — I48.0 PAROXYSMAL ATRIAL FIBRILLATION (HCC): ICD-10-CM

## 2020-09-01 DIAGNOSIS — Z45.018 PACEMAKER REPROGRAMMING/CHECK: Primary | ICD-10-CM

## 2020-09-01 DIAGNOSIS — R00.1 BRADYCARDIA: ICD-10-CM

## 2020-09-01 DIAGNOSIS — E66.09 CLASS 2 OBESITY DUE TO EXCESS CALORIES WITHOUT SERIOUS COMORBIDITY IN ADULT, UNSPECIFIED BMI: ICD-10-CM

## 2020-09-01 DIAGNOSIS — R55 NEAR SYNCOPE: ICD-10-CM

## 2020-09-01 DIAGNOSIS — R55 SYNCOPE, UNSPECIFIED SYNCOPE TYPE: ICD-10-CM

## 2020-09-01 PROCEDURE — 93000 ELECTROCARDIOGRAM COMPLETE: CPT | Performed by: INTERNAL MEDICINE

## 2020-09-01 PROCEDURE — 99215 OFFICE O/P EST HI 40 MIN: CPT | Performed by: INTERNAL MEDICINE

## 2020-09-01 NOTE — PROGRESS NOTES
Subjective: Blanca Rice is a 54 y.o. male is here for EP consult. ILR demonstrated PAF with rvr.  Pt with episodes of near syncope in the past.     Patient Active Problem List    Diagnosis Date Noted    Eosinophilic esophagitis 66/32/1772    Bilateral carotid artery stenosis 11/01/2019    Gastroesophageal reflux disease 09/09/2019    Severe obesity ([address] 02/04/2019    Near syncope 01/14/2019    Class 2 obesity due to excess calories without serious comorbidity in adult 07/19/2018    BPH with obstruction/lower urinary tract symptoms 01/30/2018    Cervical radiculopathy at C7 01/30/2018      John Isidro MD  Past Medical History:   Diagnosis Date    Acid reflux     Acute pain of right shoulder 1/30/2018    Fainting spell     Joint pain     Muscle ache     Muscle pain     Muscle weakness     Sinus problem     Urine troubles       Past Surgical History:   Procedure Laterality Date    ABDOMEN SURGERY PROC UNLISTED      bilat ing hernia repair with repeat R    ABDOMEN SURGERY PROC UNLISTED      umbilical hernia repair    HX ENDOSCOPY  2020    HX ROTATOR CUFF REPAIR  04/03/2018    HX VASECTOMY  2017    [place] INSERTION SUBQ CARDIAC RHYTHM MONITOR W/PRGRMG N/A 5/15/2020    LOOP RECORDER INSERT performed by Larry Miller MD at hospitals CARDIAC CATH LAB     Allergies   Allergen Reactions    Hornet Venom Swelling     Carries Epi pen    Adhesive Rash     Dermabond      Family History   Problem Relation Age of Onset    No Known Problems Mother     Heart Attack Father     No Known Problems Sister     No Known Problems Brother     No Known Problems Maternal Grandmother     No Known Problems Maternal Grandfather     Heart Failure Paternal Grandmother     Cancer Paternal Grandfather         esophagus    No Known Problems Other     negative for cardiac disease  Social History     Socioeconomic History    Marital status:      Spouse name: Anne Hubbard Number of children: Not on file    Years of education: Not on file    Highest education level: Not on file   Occupational History    Occupation:      Comment: [employer]   Tobacco Use    Smoking status: Never Smoker    Smokeless tobacco: Never Used   Substance and Sexual Activity    Alcohol use: No    Drug use: No    Sexual activity: Yes     Partners: Female   Other Topics Concern     Service No     Current Outpatient Medications   Medication Sig    omeprazole (PRILOSEC) 40 mg capsule Take 40 mg by mouth daily.  montelukast (SINGULAIR) 10 mg tablet Take 1 Tab by mouth daily.  fluticasone propionate (FLONASE) 50 mcg/actuation nasal spray 1 spray each nostril twice daily    pseudoephedrine (SUDAFED) 60 mg tablet Take 1 Tab by mouth every six (6) hours as needed for Congestion.  guaiFENesin (Mucinex) 1,200 mg Ta12 ER tablet Take 1 Tab by mouth two (2) times a day.  ketotifen (ZADITOR) 0.025 % (0.035 %) ophthalmic solution Administer 1 Drop to both eyes two (2) times daily as needed (itchy watery eyes).  albuterol (PROVENTIL HFA, VENTOLIN HFA, PROAIR HFA) 90 mcg/actuation inhaler Take 2 Puffs by inhalation every four (4) hours as needed for Wheezing.  docosahexanoic acid/epa (FISH OIL PO) Take  by mouth daily. No current facility-administered medications for this visit. Vitals:    09/01/20 1323 09/01/20 1331   BP: (!) 136/100 (!) 138/100   Pulse: (!) 57    Resp: 18    SpO2: 99%    Weight: 277 lb (125.6 kg)    Height: 6' (1.829 m)        I have reviewed the nurses notes, vitals, problem list, allergy list, medical history, family, social history and medications. Review of Symptoms:    General: Pt denies excessive weight gain or loss. Pt is able to conduct ADL's  HEENT: Denies blurred vision, headaches, hearing loss, epistaxis and difficulty swallowing. Respiratory: Denies cough, congestion, shortness of breath, GODDARD, wheezing or stridor.   Cardiovascular: Denies precordial pain, palpitations, edema or PND  Gastrointestinal: Denies poor appetite, indigestion, abdominal pain or blood in stool  Genitourinary: Denies hematuria, dysuria, increased urinary frequency  Musculoskeletal: Denies joint pain or swelling from muscles or joints  Neurologic: Denies tremor, paresthesias, headache, or sensory motor disturbance  Psychiatric: Denies confusion, insomnia, depression  Integumentray: Denies rash, itching or ulcers. Hematologic: Denies easy bruising, bleeding    Physical Exam:      General: Well developed, in no acute distress. HEENT: Eyes - PERRL, no jvd  Heart:  Normal S1/S2 negative S3 or S4. Regular, no murmur, gallop or rub. Respiratory: Clear bilaterally x 4, no wheezing or rales  Abdomen:   Soft, non-tender, bowel sounds are active. Extremities:  No edema, normal cap refill, no cyanosis. Musculoskeletal: No clubbing  Neuro: A&Ox3, speech clear, gait stable. Skin: Skin color is normal. No rashes or lesions. Non diaphoretic, no ulcers or subcutaneous nodule  Vascular: 2+ pulses symmetric in all extremities  Psych - judgement intact and orientation is wnl     Cardiographics    Ekg: nsr    ilr - SVT and PAF    No results found for this or any previous visit. Lab Results   Component Value Date/Time    WBC 8.1 03/29/2018 10:35 AM    HGB 15.9 03/29/2018 10:35 AM    HCT 46.2 03/29/2018 10:35 AM    PLATELET 133 29/85/7944 10:35 AM    MCV 86.2 03/29/2018 10:35 AM      Lab Results   Component Value Date/Time    Sodium 142 02/04/2019 04:22 PM    Potassium 3.9 02/04/2019 04:22 PM    Chloride 104 02/04/2019 04:22 PM    CO2 22 02/04/2019 04:22 PM    Glucose 106 (H) 02/04/2019 04:22 PM    BUN 14 02/04/2019 04:22 PM    Creatinine 0.89 02/04/2019 04:22 PM    BUN/Creatinine ratio 16 02/04/2019 04:22 PM    GFR est  02/04/2019 04:22 PM    GFR est non- 02/04/2019 04:22 PM    Calcium 8.9 02/04/2019 04:22 PM    Bilirubin, total 0.2 02/04/2019 04:22 PM    Alk.  phosphatase 72 02/04/2019 04:22 PM    Protein, total 6.8 02/04/2019 04:22 PM    Albumin 4.3 02/04/2019 04:22 PM    A-G Ratio 1.7 02/04/2019 04:22 PM    ALT (SGPT) 17 02/04/2019 04:22 PM         Assessment:     Assessment:        ICD-10-CM ICD-9-CM    1. Pacemaker reprogramming/check  Z45.018 V53.31 AMB POC EKG ROUTINE W/ 12 LEADS, INTER & REP      CBC WITH AUTOMATED DIFF      METABOLIC PANEL, COMPREHENSIVE      PROTHROMBIN TIME + INR      XR CHEST PA LAT   2. Near syncope  R55 780.2 CBC WITH AUTOMATED DIFF      METABOLIC PANEL, COMPREHENSIVE      PROTHROMBIN TIME + INR      XR CHEST PA LAT   3. Bradycardia  R00.1 427.89 CBC WITH AUTOMATED DIFF      METABOLIC PANEL, COMPREHENSIVE      PROTHROMBIN TIME + INR      XR CHEST PA LAT   4. Class 2 obesity due to excess calories without serious comorbidity in adult, unspecified BMI  E66.09 278.00 CBC WITH AUTOMATED DIFF      METABOLIC PANEL, COMPREHENSIVE      PROTHROMBIN TIME + INR      XR CHEST PA LAT   5. Syncope, unspecified syncope type  R55 780.2 CBC WITH AUTOMATED DIFF      METABOLIC PANEL, COMPREHENSIVE      PROTHROMBIN TIME + INR      XR CHEST PA LAT   6. Paroxysmal atrial fibrillation (HCC)  I48.0 427.31 CBC WITH AUTOMATED DIFF      METABOLIC PANEL, COMPREHENSIVE      PROTHROMBIN TIME + INR      XR CHEST PA LAT     Orders Placed This Encounter    XR CHEST PA LAT     Standing Status:   Future     Standing Expiration Date:   10/1/2020    CBC WITH AUTOMATED DIFF    METABOLIC PANEL, COMPREHENSIVE    PROTHROMBIN TIME + INR    AMB POC EKG ROUTINE W/ 12 LEADS, INTER & REP     Order Specific Question:   Reason for Exam:     Answer:   routine        Plan:   Josiah Truong is having PAF with rvr (194 bpm) and PSVT on his ILR. The ILR was implanted for near syncope and episodes of profound bradycardia. Josiah Truong is a candidate for an afib/svt ablation. I discussed the risks/benefits/alternatives of the procedure with the patient.  Risks include (but are not limited to) bleeding, heart block, infection, cva/mi/tamponade/esophageal perforation/pv stenosis/death. The patient understands that there is a 0-0% major complication rate and agrees to proceed. Thank you for this interesting consultation. Continue medical management for obesity. Thank you for allowing me to participate in [facility] 's care.     Jovanna Torres MD, Yann Garcia

## 2020-09-01 NOTE — LETTER
9/1/20 Patient: Tani Mcarthur  
YOB: 1970 Date of Visit: 9/1/2020 Chirag Hatch MD 
[place] [id number] VIA In Basket Dear Chirag Hatch MD, Thank you for referring Mr. Tani Mcarthur to [address] [place] [address] for evaluation. My notes for this consultation are attached. If you have questions, please do not hesitate to call me. I look forward to following your patient along with you. Sincerely, Cierra Mackenzie MD

## 2020-09-01 NOTE — H&P (VIEW-ONLY)
Subjective: René Sotelo is a 54 y.o. male is here for EP consult. ILR demonstrated PAF with rvr. Pt with episodes of near syncope in the past.  
 
Patient Active Problem List  
 Diagnosis Date Noted  Eosinophilic esophagitis 45/68/3737  Bilateral carotid artery stenosis 11/01/2019  Gastroesophageal reflux disease 09/09/2019  Severe obesity ([address] 02/04/2019  Near syncope 01/14/2019  Class 2 obesity due to excess calories without serious comorbidity in adult 07/19/2018  BPH with obstruction/lower urinary tract symptoms 01/30/2018  Cervical radiculopathy at C7 01/30/2018 Christina Dumont MD 
Past Medical History:  
Diagnosis Date  Acid reflux  Acute pain of right shoulder 1/30/2018  Fainting spell  Joint pain  Muscle ache  Muscle pain  Muscle weakness  Sinus problem  Urine troubles Past Surgical History:  
Procedure Laterality Date  ABDOMEN SURGERY PROC UNLISTED    
 bilat ing hernia repair with repeat R  
 ABDOMEN SURGERY PROC UNLISTED    
 umbilical hernia repair [facility] ENDOSCOPY  2020  HX ROTATOR CUFF REPAIR  04/03/2018  HX VASECTOMY  2017  [place] INSERTION SUBQ CARDIAC RHYTHM MONITOR W/PRGRMG N/A 5/15/2020 LOOP RECORDER INSERT performed by Marcie Madrid MD at [address] CATH LAB Allergies Allergen Reactions  Hornet Venom Swelling Carries Epi pen  Adhesive Rash Dermabond Family History Problem Relation Age of Onset  No Known Problems Mother  Heart Attack Father  No Known Problems Sister  No Known Problems Brother  No Known Problems Maternal Grandmother  No Known Problems Maternal Grandfather  Heart Failure Paternal Grandmother  Cancer Paternal Grandfather   
     esophagus  No Known Problems Other   
 negative for cardiac disease Social History Socioeconomic History  Marital status:  Spouse name: Thanh Simon  Number of children: Not on file  Years of education: Not on file  Highest education level: Not on file Occupational History  Occupation:  Comment: [employer] Tobacco Use  Smoking status: Never Smoker  Smokeless tobacco: Never Used Substance and Sexual Activity  Alcohol use: No  
 Drug use: No  
 Sexual activity: Yes  
  Partners: Female Other Topics Concern   Service No  
 
Current Outpatient Medications Medication Sig  
 omeprazole (PRILOSEC) 40 mg capsule Take 40 mg by mouth daily.  montelukast (SINGULAIR) 10 mg tablet Take 1 Tab by mouth daily.  fluticasone propionate (FLONASE) 50 mcg/actuation nasal spray 1 spray each nostril twice daily  pseudoephedrine (SUDAFED) 60 mg tablet Take 1 Tab by mouth every six (6) hours as needed for Congestion.  guaiFENesin (Mucinex) 1,200 mg Ta12 ER tablet Take 1 Tab by mouth two (2) times a day.  ketotifen (ZADITOR) 0.025 % (0.035 %) ophthalmic solution Administer 1 Drop to both eyes two (2) times daily as needed (itchy watery eyes).  albuterol (PROVENTIL HFA, VENTOLIN HFA, PROAIR HFA) 90 mcg/actuation inhaler Take 2 Puffs by inhalation every four (4) hours as needed for Wheezing.  docosahexanoic acid/epa (FISH OIL PO) Take  by mouth daily. No current facility-administered medications for this visit. Vitals:  
 09/01/20 1323 09/01/20 1331 BP: (!) 136/100 (!) 138/100 Pulse: (!) 57 Resp: 18 SpO2: 99% Weight: 277 lb (125.6 kg) Height: 6' (1.829 m) I have reviewed the nurses notes, vitals, problem list, allergy list, medical history, family, social history and medications. Review of Symptoms: 
 
General: Pt denies excessive weight gain or loss. Pt is able to conduct ADL's HEENT: Denies blurred vision, headaches, hearing loss, epistaxis and difficulty swallowing. Respiratory: Denies cough, congestion, shortness of breath, GODDARD, wheezing or stridor. Cardiovascular: Denies precordial pain, palpitations, edema or PND Gastrointestinal: Denies poor appetite, indigestion, abdominal pain or blood in stool Genitourinary: Denies hematuria, dysuria, increased urinary frequency Musculoskeletal: Denies joint pain or swelling from muscles or joints Neurologic: Denies tremor, paresthesias, headache, or sensory motor disturbance Psychiatric: Denies confusion, insomnia, depression Integumentray: Denies rash, itching or ulcers. Hematologic: Denies easy bruising, bleeding Physical Exam:   
 
General: Well developed, in no acute distress. HEENT: Eyes - PERRL, no jvd Heart:  Normal S1/S2 negative S3 or S4. Regular, no murmur, gallop or rub. Respiratory: Clear bilaterally x 4, no wheezing or rales Abdomen:   Soft, non-tender, bowel sounds are active. Extremities:  No edema, normal cap refill, no cyanosis. Musculoskeletal: No clubbing Neuro: A&Ox3, speech clear, gait stable. Skin: Skin color is normal. No rashes or lesions. Non diaphoretic, no ulcers or subcutaneous nodule Vascular: 2+ pulses symmetric in all extremities Psych - judgement intact and orientation is wnl Cardiographics Ekg: nsr 
 
ilr - SVT and PAF No results found for this or any previous visit. Lab Results Component Value Date/Time WBC 8.1 03/29/2018 10:35 AM  
 HGB 15.9 03/29/2018 10:35 AM  
 HCT 46.2 03/29/2018 10:35 AM  
 PLATELET 585 33/17/2901 10:35 AM  
 MCV 86.2 03/29/2018 10:35 AM  
  
Lab Results Component Value Date/Time  Sodium 142 02/04/2019 04:22 PM  
 Potassium 3.9 02/04/2019 04:22 PM  
 Chloride 104 02/04/2019 04:22 PM  
 CO2 22 02/04/2019 04:22 PM  
 Glucose 106 (H) 02/04/2019 04:22 PM  
 BUN 14 02/04/2019 04:22 PM  
 Creatinine 0.89 02/04/2019 04:22 PM  
 BUN/Creatinine ratio 16 02/04/2019 04:22 PM  
 GFR est  02/04/2019 04:22 PM  
 GFR est non- 02/04/2019 04:22 PM  
 Calcium 8.9 02/04/2019 04:22 PM  
 Bilirubin, total 0.2 02/04/2019 04:22 PM  
 Alk. phosphatase 72 02/04/2019 04:22 PM  
 Protein, total 6.8 02/04/2019 04:22 PM  
 Albumin 4.3 02/04/2019 04:22 PM  
 A-G Ratio 1.7 02/04/2019 04:22 PM  
 ALT (SGPT) 17 02/04/2019 04:22 PM  
  
 
 Assessment: 
 
 Assessment: ICD-10-CM ICD-9-CM 1. Pacemaker reprogramming/check  Z45.018 V53.31 AMB POC EKG ROUTINE W/ 12 LEADS, INTER & REP  
   CBC WITH AUTOMATED DIFF  
   METABOLIC PANEL, COMPREHENSIVE PROTHROMBIN TIME + INR  
   XR CHEST PA LAT 2. Near syncope  R55 780.2 CBC WITH AUTOMATED DIFF  
   METABOLIC PANEL, COMPREHENSIVE PROTHROMBIN TIME + INR  
   XR CHEST PA LAT 3. Bradycardia  R00.1 427.89 CBC WITH AUTOMATED DIFF  
   METABOLIC PANEL, COMPREHENSIVE PROTHROMBIN TIME + INR  
   XR CHEST PA LAT 4. Class 2 obesity due to excess calories without serious comorbidity in adult, unspecified BMI  E66.09 278.00 CBC WITH AUTOMATED DIFF  
   METABOLIC PANEL, COMPREHENSIVE PROTHROMBIN TIME + INR  
   XR CHEST PA LAT 5. Syncope, unspecified syncope type  R55 780.2 CBC WITH AUTOMATED DIFF  
   METABOLIC PANEL, COMPREHENSIVE PROTHROMBIN TIME + INR  
   XR CHEST PA LAT 6. Paroxysmal atrial fibrillation (HCC)  I48.0 427.31 CBC WITH AUTOMATED DIFF  
   METABOLIC PANEL, COMPREHENSIVE PROTHROMBIN TIME + INR  
   XR CHEST PA LAT Orders Placed This Encounter  XR CHEST PA LAT Standing Status:   Future Standing Expiration Date:   10/1/2020  CBC WITH AUTOMATED DIFF  
 METABOLIC PANEL, COMPREHENSIVE  
 PROTHROMBIN TIME + INR  
 AMB POC EKG ROUTINE W/ 12 LEADS, INTER & REP Order Specific Question:   Reason for Exam: Answer:   routine Plan:  
Katelyn Dia is having PAF with rvr (194 bpm) and PSVT on his ILR. The ILR was implanted for near syncope and episodes of profound bradycardia.  Katelyn Dia is a candidate for an afib/svt ablation. I discussed the risks/benefits/alternatives of the procedure with the patient. Risks include (but are not limited to) bleeding, heart block, infection, cva/mi/tamponade/esophageal perforation/pv stenosis/death. The patient understands that there is a 1-7% major complication rate and agrees to proceed. Thank you for this interesting consultation. Continue medical management for obesity. Thank you for allowing me to participate in [facility] 's care.  
 
Giovanna Weeks MD, Kerri Boston

## 2020-09-01 NOTE — PROGRESS NOTES
1. Have you been to the ER, urgent care clinic since your last visit? Hospitalized since your last visit? No    2. Have you seen or consulted any other health care providers outside of the [address] [place] [address] since your last visit? Include any pap smears or colon screening. No    Chief Complaint   Patient presents with    Pacemaker Check     Discuss linq transmission. Denied cardiac symptoms.

## 2020-09-03 ENCOUNTER — TELEPHONE (OUTPATIENT)
Dept: CARDIOLOGY CLINIC | Age: 50
End: 2020-09-03

## 2020-09-15 DIAGNOSIS — E66.09 CLASS 2 OBESITY DUE TO EXCESS CALORIES WITHOUT SERIOUS COMORBIDITY IN ADULT, UNSPECIFIED BMI: ICD-10-CM

## 2020-09-15 DIAGNOSIS — R00.1 BRADYCARDIA: ICD-10-CM

## 2020-09-15 DIAGNOSIS — R55 SYNCOPE, UNSPECIFIED SYNCOPE TYPE: ICD-10-CM

## 2020-09-15 DIAGNOSIS — Z45.018 PACEMAKER REPROGRAMMING/CHECK: ICD-10-CM

## 2020-09-15 DIAGNOSIS — R55 NEAR SYNCOPE: ICD-10-CM

## 2020-09-15 DIAGNOSIS — I48.0 PAROXYSMAL ATRIAL FIBRILLATION (HCC): ICD-10-CM

## 2020-09-15 LAB
ALBUMIN SERPL-MCNC: 4.2 G/DL (ref 4–5)
ALBUMIN/GLOB SERPL: 1.6 {RATIO} (ref 1.2–2.2)
ALP SERPL-CCNC: 74 IU/L (ref 39–117)
ALT SERPL-CCNC: 36 IU/L (ref 0–44)
AST SERPL-CCNC: 24 IU/L (ref 0–40)
BASOPHILS # BLD AUTO: 0.1 X10E3/UL (ref 0–0.2)
BASOPHILS NFR BLD AUTO: 1 %
BILIRUB SERPL-MCNC: 0.3 MG/DL (ref 0–1.2)
BUN SERPL-MCNC: 12 MG/DL (ref 6–24)
BUN/CREAT SERPL: 12 (ref 9–20)
CALCIUM SERPL-MCNC: 9 MG/DL (ref 8.7–10.2)
CHLORIDE SERPL-SCNC: 101 MMOL/L (ref 96–106)
CO2 SERPL-SCNC: 24 MMOL/L (ref 20–29)
CREAT SERPL-MCNC: 0.99 MG/DL (ref 0.76–1.27)
EOSINOPHIL # BLD AUTO: 0.6 X10E3/UL (ref 0–0.4)
EOSINOPHIL NFR BLD AUTO: 7 %
ERYTHROCYTE [DISTWIDTH] IN BLOOD BY AUTOMATED COUNT: 12.7 % (ref 11.6–15.4)
GLOBULIN SER CALC-MCNC: 2.7 G/DL (ref 1.5–4.5)
GLUCOSE SERPL-MCNC: 115 MG/DL (ref 65–99)
HCT VFR BLD AUTO: 45.4 % (ref 37.5–51)
HGB BLD-MCNC: 15.7 G/DL (ref 13–17.7)
IMM GRANULOCYTES # BLD AUTO: 0.1 X10E3/UL (ref 0–0.1)
IMM GRANULOCYTES NFR BLD AUTO: 1 %
INR PPP: 1 (ref 0.8–1.2)
LYMPHOCYTES # BLD AUTO: 2.6 X10E3/UL (ref 0.7–3.1)
LYMPHOCYTES NFR BLD AUTO: 30 %
MCH RBC QN AUTO: 29.3 PG (ref 26.6–33)
MCHC RBC AUTO-ENTMCNC: 34.6 G/DL (ref 31.5–35.7)
MCV RBC AUTO: 85 FL (ref 79–97)
MONOCYTES # BLD AUTO: 0.5 X10E3/UL (ref 0.1–0.9)
MONOCYTES NFR BLD AUTO: 6 %
NEUTROPHILS # BLD AUTO: 4.8 X10E3/UL (ref 1.4–7)
NEUTROPHILS NFR BLD AUTO: 55 %
PLATELET # BLD AUTO: 288 X10E3/UL (ref 150–450)
POTASSIUM SERPL-SCNC: 4.3 MMOL/L (ref 3.5–5.2)
PROT SERPL-MCNC: 6.9 G/DL (ref 6–8.5)
PROTHROMBIN TIME: 10.5 SEC (ref 9.1–12)
RBC # BLD AUTO: 5.35 X10E6/UL (ref 4.14–5.8)
SODIUM SERPL-SCNC: 140 MMOL/L (ref 134–144)
WBC # BLD AUTO: 8.7 X10E3/UL (ref 3.4–10.8)

## 2020-09-18 ENCOUNTER — ANESTHESIA EVENT (OUTPATIENT)
Dept: CARDIAC CATH/INVASIVE PROCEDURES | Age: 50
End: 2020-09-18
Payer: COMMERCIAL

## 2020-09-18 ENCOUNTER — HOSPITAL ENCOUNTER (OUTPATIENT)
Age: 50
Discharge: HOME | End: 2020-09-18
Attending: INTERNAL MEDICINE | Admitting: INTERNAL MEDICINE
Payer: COMMERCIAL

## 2020-09-18 ENCOUNTER — ANESTHESIA (OUTPATIENT)
Dept: CARDIAC CATH/INVASIVE PROCEDURES | Age: 50
End: 2020-09-18
Payer: COMMERCIAL

## 2020-09-18 ENCOUNTER — APPOINTMENT (OUTPATIENT)
Dept: CARDIAC CATH/INVASIVE PROCEDURES | Age: 50
End: 2020-09-18
Attending: INTERNAL MEDICINE
Payer: COMMERCIAL

## 2020-09-18 VITALS
WEIGHT: 270 LBS | TEMPERATURE: 98.7 F | DIASTOLIC BLOOD PRESSURE: 94 MMHG | HEIGHT: 72 IN | SYSTOLIC BLOOD PRESSURE: 133 MMHG | OXYGEN SATURATION: 97 % | RESPIRATION RATE: 23 BRPM | HEART RATE: 100 BPM | BODY MASS INDEX: 36.57 KG/M2

## 2020-09-18 DIAGNOSIS — I48.91 ATRIAL FIBRILLATION, UNSPECIFIED TYPE (HCC): ICD-10-CM

## 2020-09-18 LAB
ACT BLD: 120 SECS (ref 79–138)
ACT BLD: 241 SECS (ref 79–138)
ACT BLD: 285 SECS (ref 79–138)

## 2020-09-18 PROCEDURE — 74011250636 HC RX REV CODE- 250/636: Performed by: NURSE ANESTHETIST, CERTIFIED REGISTERED

## 2020-09-18 PROCEDURE — 76060000034 HC ANESTHESIA 1.5 TO 2 HR: Performed by: INTERNAL MEDICINE

## 2020-09-18 PROCEDURE — 93662 INTRACARDIAC ECG (ICE): CPT

## 2020-09-18 PROCEDURE — C1730 CATH, EP, 19 OR FEW ELECT: HCPCS | Performed by: INTERNAL MEDICINE

## 2020-09-18 PROCEDURE — 77030029163 HC CBL EP DX ACHV DISP MEDT -C: Performed by: INTERNAL MEDICINE

## 2020-09-18 PROCEDURE — 77030030261 HC CBL UMB ELEC DISP MEDT -C: Performed by: INTERNAL MEDICINE

## 2020-09-18 PROCEDURE — 74011250636 HC RX REV CODE- 250/636: Performed by: INTERNAL MEDICINE

## 2020-09-18 PROCEDURE — C1733 CATH, EP, OTHR THAN COOL-TIP: HCPCS | Performed by: INTERNAL MEDICINE

## 2020-09-18 PROCEDURE — 77030013797 HC KT TRNSDUC PRSSR EDWD -A: Performed by: INTERNAL MEDICINE

## 2020-09-18 PROCEDURE — 77030018733 HC ELECTRD KT ENSITE STJU -G: Performed by: INTERNAL MEDICINE

## 2020-09-18 PROCEDURE — 77030021678 HC GLIDESCP STAT DISP VERT -B: Performed by: ANESTHESIOLOGY

## 2020-09-18 PROCEDURE — C1769 GUIDE WIRE: HCPCS | Performed by: INTERNAL MEDICINE

## 2020-09-18 PROCEDURE — C1894 INTRO/SHEATH, NON-LASER: HCPCS | Performed by: INTERNAL MEDICINE

## 2020-09-18 PROCEDURE — 77030008543 HC TBNG MON PRSS MRTM -A: Performed by: INTERNAL MEDICINE

## 2020-09-18 PROCEDURE — 74011000636 HC RX REV CODE- 636: Performed by: INTERNAL MEDICINE

## 2020-09-18 PROCEDURE — 77030016894 HC CBL EP DX CATH3 STJU -B: Performed by: INTERNAL MEDICINE

## 2020-09-18 PROCEDURE — 77030013079 HC BLNKT BAIR HGGR 3M -A: Performed by: ANESTHESIOLOGY

## 2020-09-18 PROCEDURE — 77030030278 HC COAX UMB DISP MEDT -C: Performed by: INTERNAL MEDICINE

## 2020-09-18 PROCEDURE — 76210000006 HC OR PH I REC 0.5 TO 1 HR: Performed by: INTERNAL MEDICINE

## 2020-09-18 PROCEDURE — 77030018729 HC ELECTRD DEFIB PAD CARD -B: Performed by: INTERNAL MEDICINE

## 2020-09-18 PROCEDURE — 74011000250 HC RX REV CODE- 250: Performed by: NURSE ANESTHETIST, CERTIFIED REGISTERED

## 2020-09-18 PROCEDURE — 93613 INTRACARDIAC EPHYS 3D MAPG: CPT

## 2020-09-18 PROCEDURE — 77030004964 HC CBL EP ABLAT BSC -C: Performed by: INTERNAL MEDICINE

## 2020-09-18 PROCEDURE — 93656 COMPRE EP EVAL ABLTJ ATR FIB: CPT | Performed by: INTERNAL MEDICINE

## 2020-09-18 PROCEDURE — 93653 COMPRE EP EVAL TX SVT: CPT | Performed by: INTERNAL MEDICINE

## 2020-09-18 PROCEDURE — 93655 ICAR CATH ABLTJ DSCRT ARRHYT: CPT | Performed by: INTERNAL MEDICINE

## 2020-09-18 PROCEDURE — C1759 CATH, INTRA ECHOCARDIOGRAPHY: HCPCS | Performed by: INTERNAL MEDICINE

## 2020-09-18 PROCEDURE — 74011000250 HC RX REV CODE- 250: Performed by: INTERNAL MEDICINE

## 2020-09-18 PROCEDURE — 77030020506 HC NDL TRNSPTL NRG BAYL -F: Performed by: INTERNAL MEDICINE

## 2020-09-18 PROCEDURE — 77030029065 HC DRSG HEMO QCLOT ZMED -B: Performed by: INTERNAL MEDICINE

## 2020-09-18 PROCEDURE — 77030029359 HC PRB ESOPH TEMP CATH ANTM -F: Performed by: INTERNAL MEDICINE

## 2020-09-18 PROCEDURE — 85347 COAGULATION TIME ACTIVATED: CPT

## 2020-09-18 PROCEDURE — 77030008684 HC TU ET CUF COVD -B: Performed by: ANESTHESIOLOGY

## 2020-09-18 RX ORDER — SUCCINYLCHOLINE CHLORIDE 20 MG/ML
INJECTION INTRAMUSCULAR; INTRAVENOUS AS NEEDED
Status: DISCONTINUED | OUTPATIENT
Start: 2020-09-18 | End: 2020-09-18 | Stop reason: HOSPADM

## 2020-09-18 RX ORDER — ACETAMINOPHEN 325 MG/1
650 TABLET ORAL ONCE
Status: CANCELLED | OUTPATIENT
Start: 2020-09-18 | End: 2020-09-18

## 2020-09-18 RX ORDER — FENTANYL CITRATE 50 UG/ML
50 INJECTION, SOLUTION INTRAMUSCULAR; INTRAVENOUS AS NEEDED
Status: CANCELLED | OUTPATIENT
Start: 2020-09-18

## 2020-09-18 RX ORDER — MIDAZOLAM HYDROCHLORIDE 1 MG/ML
1 INJECTION, SOLUTION INTRAMUSCULAR; INTRAVENOUS AS NEEDED
Status: CANCELLED | OUTPATIENT
Start: 2020-09-18

## 2020-09-18 RX ORDER — SODIUM CHLORIDE 0.9 % (FLUSH) 0.9 %
5-40 SYRINGE (ML) INJECTION AS NEEDED
Status: DISCONTINUED | OUTPATIENT
Start: 2020-09-18 | End: 2020-09-18 | Stop reason: HOSPADM

## 2020-09-18 RX ORDER — MIDAZOLAM HYDROCHLORIDE 1 MG/ML
INJECTION, SOLUTION INTRAMUSCULAR; INTRAVENOUS AS NEEDED
Status: DISCONTINUED | OUTPATIENT
Start: 2020-09-18 | End: 2020-09-18 | Stop reason: HOSPADM

## 2020-09-18 RX ORDER — DEXAMETHASONE SODIUM PHOSPHATE 4 MG/ML
INJECTION, SOLUTION INTRA-ARTICULAR; INTRALESIONAL; INTRAMUSCULAR; INTRAVENOUS; SOFT TISSUE AS NEEDED
Status: DISCONTINUED | OUTPATIENT
Start: 2020-09-18 | End: 2020-09-18 | Stop reason: HOSPADM

## 2020-09-18 RX ORDER — LIDOCAINE HYDROCHLORIDE 20 MG/ML
INJECTION, SOLUTION EPIDURAL; INFILTRATION; INTRACAUDAL; PERINEURAL AS NEEDED
Status: DISCONTINUED | OUTPATIENT
Start: 2020-09-18 | End: 2020-09-18 | Stop reason: HOSPADM

## 2020-09-18 RX ORDER — SODIUM CHLORIDE, SODIUM LACTATE, POTASSIUM CHLORIDE, CALCIUM CHLORIDE 600; 310; 30; 20 MG/100ML; MG/100ML; MG/100ML; MG/100ML
100 INJECTION, SOLUTION INTRAVENOUS CONTINUOUS
Status: DISCONTINUED | OUTPATIENT
Start: 2020-09-18 | End: 2020-09-18 | Stop reason: HOSPADM

## 2020-09-18 RX ORDER — ROCURONIUM BROMIDE 10 MG/ML
INJECTION, SOLUTION INTRAVENOUS AS NEEDED
Status: DISCONTINUED | OUTPATIENT
Start: 2020-09-18 | End: 2020-09-18 | Stop reason: HOSPADM

## 2020-09-18 RX ORDER — SODIUM CHLORIDE 0.9 % (FLUSH) 0.9 %
5-40 SYRINGE (ML) INJECTION EVERY 8 HOURS
Status: DISCONTINUED | OUTPATIENT
Start: 2020-09-18 | End: 2020-09-18 | Stop reason: HOSPADM

## 2020-09-18 RX ORDER — ROPIVACAINE HYDROCHLORIDE 5 MG/ML
30 INJECTION, SOLUTION EPIDURAL; INFILTRATION; PERINEURAL AS NEEDED
Status: CANCELLED | OUTPATIENT
Start: 2020-09-18

## 2020-09-18 RX ORDER — SODIUM CHLORIDE 9 MG/ML
25 INJECTION, SOLUTION INTRAVENOUS CONTINUOUS
Status: CANCELLED | OUTPATIENT
Start: 2020-09-18 | End: 2020-09-19

## 2020-09-18 RX ORDER — SODIUM CHLORIDE, SODIUM LACTATE, POTASSIUM CHLORIDE, CALCIUM CHLORIDE 600; 310; 30; 20 MG/100ML; MG/100ML; MG/100ML; MG/100ML
100 INJECTION, SOLUTION INTRAVENOUS CONTINUOUS
Status: CANCELLED | OUTPATIENT
Start: 2020-09-18 | End: 2020-09-19

## 2020-09-18 RX ORDER — LIDOCAINE HYDROCHLORIDE 10 MG/ML
0.1 INJECTION, SOLUTION EPIDURAL; INFILTRATION; INTRACAUDAL; PERINEURAL AS NEEDED
Status: CANCELLED | OUTPATIENT
Start: 2020-09-18

## 2020-09-18 RX ORDER — SODIUM CHLORIDE 0.9 % (FLUSH) 0.9 %
5-40 SYRINGE (ML) INJECTION EVERY 8 HOURS
Status: CANCELLED | OUTPATIENT
Start: 2020-09-18

## 2020-09-18 RX ORDER — HEPARIN SODIUM 1000 [USP'U]/ML
INJECTION, SOLUTION INTRAVENOUS; SUBCUTANEOUS AS NEEDED
Status: DISCONTINUED | OUTPATIENT
Start: 2020-09-18 | End: 2020-09-18 | Stop reason: HOSPADM

## 2020-09-18 RX ORDER — SODIUM CHLORIDE 0.9 % (FLUSH) 0.9 %
5-40 SYRINGE (ML) INJECTION AS NEEDED
Status: CANCELLED | OUTPATIENT
Start: 2020-09-18

## 2020-09-18 RX ORDER — ACETAMINOPHEN 325 MG/1
650 TABLET ORAL
Status: DISCONTINUED | OUTPATIENT
Start: 2020-09-18 | End: 2020-09-18 | Stop reason: HOSPADM

## 2020-09-18 RX ORDER — ONDANSETRON 2 MG/ML
4 INJECTION INTRAMUSCULAR; INTRAVENOUS AS NEEDED
Status: DISCONTINUED | OUTPATIENT
Start: 2020-09-18 | End: 2020-09-18 | Stop reason: HOSPADM

## 2020-09-18 RX ORDER — MORPHINE SULFATE 10 MG/ML
2 INJECTION, SOLUTION INTRAMUSCULAR; INTRAVENOUS
Status: DISCONTINUED | OUTPATIENT
Start: 2020-09-18 | End: 2020-09-18 | Stop reason: HOSPADM

## 2020-09-18 RX ORDER — HEPARIN SODIUM 200 [USP'U]/100ML
INJECTION, SOLUTION INTRAVENOUS
Status: COMPLETED | OUTPATIENT
Start: 2020-09-18 | End: 2020-09-18

## 2020-09-18 RX ORDER — HYDROMORPHONE HYDROCHLORIDE 1 MG/ML
0.5 INJECTION, SOLUTION INTRAMUSCULAR; INTRAVENOUS; SUBCUTANEOUS
Status: DISCONTINUED | OUTPATIENT
Start: 2020-09-18 | End: 2020-09-18 | Stop reason: HOSPADM

## 2020-09-18 RX ORDER — SODIUM CHLORIDE 9 MG/ML
INJECTION, SOLUTION INTRAVENOUS
Status: DISCONTINUED | OUTPATIENT
Start: 2020-09-18 | End: 2020-09-18 | Stop reason: HOSPADM

## 2020-09-18 RX ORDER — HYDROCODONE BITARTRATE AND ACETAMINOPHEN 5; 325 MG/1; MG/1
1 TABLET ORAL
Status: DISCONTINUED | OUTPATIENT
Start: 2020-09-18 | End: 2020-09-18 | Stop reason: HOSPADM

## 2020-09-18 RX ORDER — FENTANYL CITRATE 50 UG/ML
25 INJECTION, SOLUTION INTRAMUSCULAR; INTRAVENOUS
Status: DISCONTINUED | OUTPATIENT
Start: 2020-09-18 | End: 2020-09-18 | Stop reason: HOSPADM

## 2020-09-18 RX ORDER — PROTAMINE SULFATE 10 MG/ML
INJECTION, SOLUTION INTRAVENOUS AS NEEDED
Status: DISCONTINUED | OUTPATIENT
Start: 2020-09-18 | End: 2020-09-18 | Stop reason: HOSPADM

## 2020-09-18 RX ORDER — FENTANYL CITRATE 50 UG/ML
INJECTION, SOLUTION INTRAMUSCULAR; INTRAVENOUS AS NEEDED
Status: DISCONTINUED | OUTPATIENT
Start: 2020-09-18 | End: 2020-09-18 | Stop reason: HOSPADM

## 2020-09-18 RX ORDER — ONDANSETRON 2 MG/ML
INJECTION INTRAMUSCULAR; INTRAVENOUS AS NEEDED
Status: DISCONTINUED | OUTPATIENT
Start: 2020-09-18 | End: 2020-09-18 | Stop reason: HOSPADM

## 2020-09-18 RX ORDER — DIPHENHYDRAMINE HYDROCHLORIDE 50 MG/ML
12.5 INJECTION, SOLUTION INTRAMUSCULAR; INTRAVENOUS AS NEEDED
Status: DISCONTINUED | OUTPATIENT
Start: 2020-09-18 | End: 2020-09-18 | Stop reason: HOSPADM

## 2020-09-18 RX ORDER — MIDAZOLAM HYDROCHLORIDE 1 MG/ML
0.5 INJECTION, SOLUTION INTRAMUSCULAR; INTRAVENOUS
Status: DISCONTINUED | OUTPATIENT
Start: 2020-09-18 | End: 2020-09-18 | Stop reason: HOSPADM

## 2020-09-18 RX ORDER — PROPOFOL 10 MG/ML
INJECTION, EMULSION INTRAVENOUS AS NEEDED
Status: DISCONTINUED | OUTPATIENT
Start: 2020-09-18 | End: 2020-09-18 | Stop reason: HOSPADM

## 2020-09-18 RX ADMIN — MIDAZOLAM 2 MG: 1 INJECTION INTRAMUSCULAR; INTRAVENOUS at 11:48

## 2020-09-18 RX ADMIN — DEXAMETHASONE SODIUM PHOSPHATE 4 MG: 4 INJECTION, SOLUTION INTRAMUSCULAR; INTRAVENOUS at 11:57

## 2020-09-18 RX ADMIN — LIDOCAINE HYDROCHLORIDE 20 MG: 20 INJECTION, SOLUTION INTRAVENOUS at 11:48

## 2020-09-18 RX ADMIN — ROCURONIUM BROMIDE 5 MG: 10 INJECTION INTRAVENOUS at 11:48

## 2020-09-18 RX ADMIN — ONDANSETRON HYDROCHLORIDE 4 MG: 2 INJECTION, SOLUTION INTRAMUSCULAR; INTRAVENOUS at 11:57

## 2020-09-18 RX ADMIN — HEPARIN SODIUM 15000 UNITS: 1000 INJECTION, SOLUTION INTRAVENOUS; SUBCUTANEOUS at 12:03

## 2020-09-18 RX ADMIN — SODIUM CHLORIDE: 9 INJECTION, SOLUTION INTRAVENOUS at 11:37

## 2020-09-18 RX ADMIN — PROPOFOL 200 MG: 10 INJECTION, EMULSION INTRAVENOUS at 11:48

## 2020-09-18 RX ADMIN — SUCCINYLCHOLINE CHLORIDE 180 MG: 20 INJECTION, SOLUTION INTRAMUSCULAR; INTRAVENOUS at 11:48

## 2020-09-18 RX ADMIN — HEPARIN SODIUM 5000 UNITS: 1000 INJECTION, SOLUTION INTRAVENOUS; SUBCUTANEOUS at 12:15

## 2020-09-18 RX ADMIN — FENTANYL CITRATE 100 MCG: 50 INJECTION, SOLUTION INTRAMUSCULAR; INTRAVENOUS at 11:48

## 2020-09-18 RX ADMIN — PHENYLEPHRINE HYDROCHLORIDE 10 MCG: 10 INJECTION INTRAVENOUS at 11:55

## 2020-09-18 RX ADMIN — PROTAMINE SULFATE 100 MG: 10 INJECTION, SOLUTION INTRAVENOUS at 12:37

## 2020-09-18 NOTE — Clinical Note
Sheath #1: sheath exchanged. Hemostasis achieved.  8fr sheath RFV removed/ SL1 advanced over package wire/ aspirated and flushed

## 2020-09-18 NOTE — DISCHARGE INSTRUCTIONS
[address], [place], [address] [facility]  [phone]        ATRIAL FIBRILLATION ABLATION DISCHARGE INSTRUCTIONS    Patient ID:  Anya Last  [id number]  6 y.o.  1970    Admit Date: 9/18/2020    Discharge Date: 9/18/2020     Admitting Physician: Shelby Ayala MD     Discharge Physician: Shelby Ayala MD    Admission Diagnoses:   Atrial fibrillation, unspecified type [facility]) [I48.91]    Discharge Diagnoses: Active Problems:    * No active hospital problems. *      Discharge Condition: Good    Cardiology Procedures this Admission:  Atrial fibrillation. Disposition: home    Reference discharge instructions provided by nursing for diet and activity. Follow-up with Dr Ashanti Tineo or his Nurse Practitioners in 1-2 weeks. Call [phone] to make an appointment. Signed:  YVON Stephenson  9/18/2020  2:17 PM    S/P ATRIAL FIBRILLATION ABLATION DISCHARGE INSTRUCTIONS    It is normal to feel tired the first couple days. Take it easy and follow the physicians instructions. CHECK THE CATHETER INSERTION SITE DAILY:  You may shower 24 hours after the procedure, remove the bandage during showering. Wash with soap and water and pat dry. Gentle cleaning of the site with soap and water is sufficient, cover with a dry clean dressing or bandage. Do not apply creams or powders to the area. Do not sit in a bathtub or pool of water for 7 days or until wound has completely healed. Temporary bruising and discomfort is normal and may last a few weeks. You may have a  formation of a small lump at the site which may last up to 6 weeks. CALL THE PHYSICIAN:  If the site becomes red, swollen or feels warm to the touch  If there is bleeding or drainage or if there is unusual pain at the groin or down the leg. If there is any bleeding, lie down, apply pressure or have someone apply pressure with a clean cloth until the bleeding stops.   If the bleeding continues, call [phone] to be transported to the hospital.  DO NOT DRIVE YOURSELF, OR HAVE ANYONE ELSE DRIVE YOU - CALL [phone]. Activity:      For the first 24-48 hours or as instructed by the physician:  No lifting, pushing or pulling over 5 pounds and no straining the insertion site. Do not life grocery bags or the garbage can, do not run the vacuum  or  for 7 days. Start with short walks as in the hospital and gradually increase as tolerated each day. It is recommended to walk 30 minutes 5-7 days per week. Follow your physicians instructions on activity. Avoid walking outside in extremes of heat or cold. Walk inside when it is cold and windy or hot and humid. Things to keep in mind:  No driving for at least 5 days or as designated by your physician. Limit the number of times you go up and down the stairs  Take rests and pace yourself with activity. Be careful and do not strain with bowel movements. Medications: Take all medications as prescribed  Call your physician if you have any questions  Keep an updated list of your medications with you at all times and give a list to your physician and pharmacist    Signs and Symptoms:  Be cautious of symptoms of angina or recurrent symptoms such as chest discomfort, unusual shortness of breath or fatigue, palpitations. After Care: Follow up with your physician as instructed. Follow a heart healthy diet with proper portion control, daily stress management, daily exercise, blood pressure and cholesterol control , and smoking cessation.

## 2020-09-18 NOTE — Clinical Note
TRANSFER - OUT REPORT:     Verbal report given to: PO Menard. Report consisted of patient's Situation, Background, Assessment and   Recommendations(SBAR). Opportunity for questions and clarification was provided. Patient transported with a Registered Nurse, Monitor and Oxygen. Oxygen used for patient = nasal cannula, @ 2 - 6 Liters.     Patient transported to: PACU .   transported with CRNA and EP Lab staff

## 2020-09-18 NOTE — PROGRESS NOTES
Pt discharge instructions reviewed including medications, follow-ups, pain management, restrictions, and site care. No further questions at this time.

## 2020-09-18 NOTE — PROGRESS NOTES
Pt has ambulated in the hallway and urinated. Both groin sites are intact and WDL. Will continue to monitor.

## 2020-09-18 NOTE — PROGRESS NOTES
Cardiac Cath Lab Recovery Arrival Note:      Jo-Ann Mario arrived to Cardiac Cath Lab, Recovery Area. Staff introduced to patient. Patient identifiers verified with NAME and DATE OF BIRTH. Procedure verified with patient. Consent forms reviewed and signed by patient or authorized representative and verified. Allergies verified. Patient and family oriented to department. Patient and family informed of procedure and plan of care. Questions answered with review. Patient prepped for procedure, per orders from physician, prior to arrival.    Patient on cardiac monitor, non-invasive blood pressure, SPO2 monitor. On room air. Patient is A&Ox 4. Patient reports no complaints. Patient in stretcher, in low position, with side rails up, call bell within reach, patient instructed to call if assistance as needed. Patient prep in: [address], [place] [address]. Family in: at bedside, will wait in waiting room. Wife Angelica Shin [phone]. Prep by: Babak Lugo, RN, PO Barroso.

## 2020-09-18 NOTE — ANESTHESIA PREPROCEDURE EVALUATION
Relevant Problems   No relevant active problems   Anesthetic History   No history of anesthetic complications            Review of Systems / Medical History  Patient summary reviewed, nursing notes reviewed and pertinent labs reviewed    Pulmonary  Within defined limits            Pertinent negatives: No smoker     Neuro/Psych   Within defined limits           Cardiovascular  Within defined limits                     GI/Hepatic/Renal     GERD           Endo/Other             Other Findings   Comments: BPH                Anesthetic Plan    ASA: 2  Anesthesia type: general      Post-op pain plan if not by surgeon: peripheral nerve block single                  Anesthetic History   No history of anesthetic complications  Malignant hyperthermia          Review of Systems / Medical History  Patient summary reviewed, nursing notes reviewed and pertinent labs reviewed    Pulmonary  Within defined limits                 Neuro/Psych   Within defined limits           Cardiovascular  Within defined limits            PAD    Exercise tolerance: >4 METS     GI/Hepatic/Renal  Within defined limits   GERD           Endo/Other  Within defined limits      Obesity and arthritis     Other Findings              Physical Exam    Airway  Mallampati: II  TM Distance: > 6 cm  Neck ROM: normal range of motion   Mouth opening: Normal     Cardiovascular  Regular rate and rhythm,  S1 and S2 normal,  no murmur, click, rub, or gallop             Dental  No notable dental hx       Pulmonary  Breath sounds clear to auscultation               Abdominal  GI exam deferred       Other Findings            Anesthetic Plan    ASA: 2  Anesthesia type: general          Induction: Intravenous  Anesthetic plan and risks discussed with: Patient

## 2020-09-18 NOTE — INTERVAL H&P NOTE
Update History & Physical 
 
The Patient's History and Physical of September 1, 2020 was reviewed with the patient and I examined the patient. There was no change. The surgical site was confirmed by the patient and me. Plan:  The risk, benefits, expected outcome, and alternative to the recommended procedure have been discussed with the patient. Patient understands and wants to proceed with the procedure.  
 
Electronically signed by Cresencio Ernandez MD on 9/18/2020 at 8:18 AM

## 2020-09-18 NOTE — Clinical Note
Transseptal Cath Performed check box under hemodynamic and ICE and Fluoro, [place] 98cm via a guiding sheath.

## 2020-09-18 NOTE — PERIOP NOTES
1317-Handoff Report from Operating Room to PACU    Report received from [address] and [facility] CRNA regarding Jonelle Gupta      Surgeon(s):  Fred Cyr MD  And Procedure(s) (LRB):  SPECIAL PROCEDURE OUTSIDE OF OR (N/A)  confirmed   with allergies and dressings discussed. Anesthesia type, drugs, patient history, complications, estimated blood loss, vital signs, intake and output, and last pain medication, lines, reversal medications and temperature were reviewed. 1406-TRANSFER - OUT REPORT:    Verbal report given to Rah FONTENOT(name) on Jonelle Gupta  being transferred to IVCU(unit) for routine post - op       Report consisted of patients Situation, Background, Assessment and   Recommendations(SBAR). Information from the following report(s) SBAR, Kardex, OR Summary, Procedure Summary, Intake/Output, MAR and Recent Results was reviewed with the receiving nurse. Opportunity for questions and clarification was provided.       Patient transported with:   Monitor  Registered Nurse  Tech

## 2020-09-18 NOTE — ANESTHESIA POSTPROCEDURE EVALUATION
Procedure(s):  ABLATION A-FIB  W COMPLETE EP STUDY  ABLATION SVT  Ep 3d Mapping  Intracardiac Echocardiogram  Ablation Svt/Vt Add On.    general    Anesthesia Post Evaluation        Patient location during evaluation: PACU  Note status: Adequate. Level of consciousness: responsive to verbal stimuli and sleepy but conscious  Pain management: satisfactory to patient  Airway patency: patent  Anesthetic complications: no  Cardiovascular status: acceptable  Respiratory status: acceptable  Hydration status: acceptable  Comments: +Post-Anesthesia Evaluation and Assessment    Patient: Edel Guerrier MRN: [id number]  SSN: [id number]   YOB: 1970  Age: 54 y.o. Sex: male      Cardiovascular Function/Vital Signs    BP (!) 144/89   Pulse 91   Temp 37.1 °C (98.8 °F)   Resp 20   Ht 6' (1.829 m)   Wt 122.5 kg (270 lb)   SpO2 100%   BMI 36.62 kg/m²     Patient is status post Procedure(s) with comments:  ABLATION A-FIB  W COMPLETE EP STUDY  ABLATION SVT  Ep 3d Mapping  Intracardiac Echocardiogram  Ablation Svt/Vt Add On - SVT. Nausea/Vomiting: Controlled. Postoperative hydration reviewed and adequate. Pain:  Pain Scale 1: Numeric (0 - 10) (09/18/20 1330)  Pain Intensity 1: 0 (09/18/20 1330)   Managed. Neurological Status:   Neuro (WDL): Exceptions to WDL (09/18/20 1317)   At baseline. Mental Status and Level of Consciousness: Arousable. Pulmonary Status:   O2 Device: Nasal cannula (09/18/20 1330)   Adequate oxygenation and airway patent. Complications related to anesthesia: None    Post-anesthesia assessment completed. No concerns. Signed By: Dylon Rees MD    9/18/2020  Post anesthesia nausea and vomiting:  controlled      INITIAL Post-op Vital signs:   Vitals Value Taken Time   /89 9/18/2020  2:00 PM   Temp     Pulse 91 9/18/2020  2:12 PM   Resp 20 9/18/2020  2:12 PM   SpO2 100 % 9/18/2020  2:12 PM   Vitals shown include unvalidated device data.

## 2020-09-21 ENCOUNTER — OFFICE VISIT (OUTPATIENT)
Dept: CARDIOLOGY CLINIC | Age: 50
End: 2020-09-21

## 2020-09-21 DIAGNOSIS — R55 SYNCOPE, UNSPECIFIED SYNCOPE TYPE: Primary | ICD-10-CM

## 2020-09-21 DIAGNOSIS — Z45.09 ENCOUNTER FOR LOOP RECORDER CHECK: ICD-10-CM

## 2020-09-21 NOTE — PROGRESS NOTES
Subjective: Tony Montana is a 54 y.o. male is here for EP follow up. The patient denies chest pain/ shortness of breath, orthopnea, PND, LE edema, palpitations, syncope, presyncope or fatigue. He notes one episode post procedure that felt like his arrhythmia. No events on ILR. Denies any issues with his groin.      Patient Active Problem List    Diagnosis Date Noted    Atrial fibrillation ([facility] [address] 10/77/9711    Eosinophilic esophagitis 77/47/4154    Bilateral carotid artery stenosis 11/01/2019    Gastroesophageal reflux disease 09/09/2019    Severe obesity ([facility] [address] 02/04/2019    Near syncope 01/14/2019    Class 2 obesity due to excess calories without serious comorbidity in adult 07/19/2018    BPH with obstruction/lower urinary tract symptoms 01/30/2018    Cervical radiculopathy at C7 01/30/2018      Carmen Isidro MD  Past Medical History:   Diagnosis Date    Acid reflux     Acute pain of right shoulder 1/30/2018    Fainting spell     Joint pain     Muscle ache     Muscle pain     Muscle weakness     Sinus problem     Urine troubles       Past Surgical History:   Procedure Laterality Date    ABDOMEN SURGERY PROC UNLISTED      bilat ing hernia repair with repeat R    ABDOMEN SURGERY PROC UNLISTED      umbilical hernia repair    CARDIAC SURG PROCEDURE UNLIST  05/2020    loop recorder insertion    HX AFIB ABLATION  09/18/2020    HX ENDOSCOPY  2020    HX ROTATOR CUFF REPAIR  04/03/2018    HX VASECTOMY  2017    INTRACARD ECHO, THER/DX INTERVENT N/A 9/18/2020    Intracardiac Echocardiogram performed by Thompson Lucero MD at [facility] CARDIAC CATH LAB    NE EPHYS EVAL W/ABLATION SUPRAVENT ARRHYTHMIA N/A 9/18/2020    ABLATION SVT performed by hTompson Lucero MD at [facility] CARDIAC CATH LAB    NE EPHYS EVL TRNSPTL TX ATRIAL FIB ISOLAT PULM VEIN N/A 9/18/2020    ABLATION A-FIB  W COMPLETE EP STUDY performed by Thompson Lucero MD at [facility] CATHETER ABLATION ARRHYTHMIA ADD ON N/A 9/18/2020    Ablation Svt/Vt Add On performed by Hood Jorge MD at [facility] CARDIAC CATH LAB    TX INSERTION SUBQ CARDIAC RHYTHM MONITOR W/PRGRMG N/A 5/15/2020    LOOP RECORDER INSERT performed by Hood Jorge MD at [facility] CARDIAC CATH LAB    TX INTRACARDIAC ELECTROPHYSIOLOGIC 3D MAPPING N/A 9/18/2020    Ep 3d Mapping performed by Hood Jorge MD at [facility] CARDIAC CATH LAB     Allergies   Allergen Reactions    Hornet Venom Swelling     Carries Epi pen    Adhesive Rash     Dermabond      Family History   Problem Relation Age of Onset    No Known Problems Mother     Heart Attack Father     No Known Problems Sister     No Known Problems Brother     No Known Problems Maternal Grandmother     No Known Problems Maternal Grandfather     Heart Failure Paternal Grandmother     Cancer Paternal Grandfather         esophagus    No Known Problems Other     negative for cardiac disease  Social History     Socioeconomic History    Marital status:      Spouse name: Vernon Nguyen Number of children: Not on file    Years of education: Not on file    Highest education level: Not on file   Occupational History    Occupation:      Comment: [employer]   Tobacco Use    Smoking status: Never Smoker    Smokeless tobacco: Never Used   Substance and Sexual Activity    Alcohol use: No    Drug use: No    Sexual activity: Yes     Partners: Female   Other Topics Concern     Service No     Current Outpatient Medications   Medication Sig    apixaban (Eliquis) 5 mg tablet Take 1 Tab by mouth two (2) times a day.  omeprazole (PRILOSEC) 40 mg capsule Take 40 mg by mouth two (2) times a day.  montelukast (SINGULAIR) 10 mg tablet Take 1 Tab by mouth daily.     fluticasone propionate (FLONASE) 50 mcg/actuation nasal spray 1 spray each nostril twice daily    pseudoephedrine (SUDAFED) 60 mg tablet Take 1 Tab by mouth every six (6) hours as needed for Congestion.  guaiFENesin (Mucinex) 1,200 mg Ta12 ER tablet Take 1 Tab by mouth two (2) times a day.  ketotifen (ZADITOR) 0.025 % (0.035 %) ophthalmic solution Administer 1 Drop to both eyes two (2) times daily as needed (itchy watery eyes).  albuterol (PROVENTIL HFA, VENTOLIN HFA, PROAIR HFA) 90 mcg/actuation inhaler Take 2 Puffs by inhalation every four (4) hours as needed for Wheezing.  docosahexanoic acid/epa (FISH OIL PO) Take  by mouth daily. No current facility-administered medications for this visit. Vitals:    09/23/20 0910   BP: 108/70   Pulse: 91   Resp: 18   SpO2: 97%   Weight: 282 lb 9.6 oz (128.2 kg)   Height: 6' (1.829 m)       I have reviewed the nurses notes, vitals, problem list, allergy list, medical history, family, social history and medications. Review of Symptoms:    General: Pt denies excessive weight gain or loss. Pt is able to conduct ADL's  HEENT: Denies blurred vision, headaches, epistaxis and difficulty swallowing. Respiratory: Denies shortness of breath, GODDARD, wheezing or stridor. Cardiovascular: Denies precordial pain, palpitations, edema or PND  Gastrointestinal: Denies poor appetite, indigestion, abdominal pain or blood in stool  Urinary: Denies dysuria, pyuria  Musculoskeletal: Denies pain or swelling from muscles or joints  Neurologic: Denies tremor, paresthesias, or sensory motor disturbance  Skin: Denies rash, itching or texture change. Psych: Denies depression      Physical Exam:      General: Well developed, in no acute distress. HEENT: Eyes - PERRL, no jvd  Heart:  Normal S1/S2 negative S3 or S4. Regular, no murmur, gallop or rub. Respiratory: Clear bilaterally x 4, no wheezing or rales  Extremities:  No edema, normal cap refill, no cyanosis. Musculoskeletal: No clubbing  Neuro: A&Ox3, speech clear, gait stable. Skin: Skin color is normal. No rashes or lesions.  Non diaphoretic. no ulcers  Vascular: 2+ pulses symmetric in all extremities  Psych - judgement intact and orientation is wnl       Cardiographics    EKG: sinus rhythm. No results found for this or any previous visit. Lab Results   Component Value Date/Time    WBC 8.7 09/14/2020 12:00 AM    HGB 15.7 09/14/2020 12:00 AM    HCT 45.4 09/14/2020 12:00 AM    PLATELET 875 80/57/5643 12:00 AM    MCV 85 09/14/2020 12:00 AM      Lab Results   Component Value Date/Time    Sodium 140 09/14/2020 12:00 AM    Potassium 4.3 09/14/2020 12:00 AM    Chloride 101 09/14/2020 12:00 AM    CO2 24 09/14/2020 12:00 AM    Glucose 115 (H) 09/14/2020 12:00 AM    BUN 12 09/14/2020 12:00 AM    Creatinine 0.99 09/14/2020 12:00 AM    BUN/Creatinine ratio 12 09/14/2020 12:00 AM    GFR est  09/14/2020 12:00 AM    GFR est non-AA 90 09/14/2020 12:00 AM    Calcium 9.0 09/14/2020 12:00 AM    Bilirubin, total 0.3 09/14/2020 12:00 AM    Alk. phosphatase 74 09/14/2020 12:00 AM    Protein, total 6.9 09/14/2020 12:00 AM    Albumin 4.2 09/14/2020 12:00 AM    A-G Ratio 1.6 09/14/2020 12:00 AM    ALT (SGPT) 36 09/14/2020 12:00 AM      Lab Results   Component Value Date/Time    TSH 2.300 12/04/2018 09:39 AM        Assessment:           ICD-10-CM ICD-9-CM    1. Atrial fibrillation, unspecified type (HCC)  I48.91 427.31 AMB POC EKG ROUTINE W/ 12 LEADS, INTER & REP   2. Bradycardia  R00.1 427.89    3. Status post placement of implantable loop recorder  Z95.818 V45.09      Orders Placed This Encounter    AMB POC EKG ROUTINE W/ 12 LEADS, INTER & REP     Order Specific Question:   Reason for Exam:     Answer:   routine        Plan:     Mr Gely Jauregui is s/p PVI of all 4 PVs, atrial pacing, on isoproterenol, induced AVNRT. Successful RFA of the slow pathway 9/18/2020. He remains in sinus on [facility]. Continue with oac and follow up in 3 mo with Dr Reg Ingram to call if further symptoms prior to follow up. Acknowledges plan. Continue medical management for AF, BMI 36 and bradycardia.     Thank you for allowing me to participate in Jeffrey Salguero 's care.       aBron Ramon NP

## 2020-09-23 ENCOUNTER — OFFICE VISIT (OUTPATIENT)
Dept: CARDIOLOGY CLINIC | Age: 50
End: 2020-09-23
Payer: COMMERCIAL

## 2020-09-23 VITALS
OXYGEN SATURATION: 97 % | DIASTOLIC BLOOD PRESSURE: 70 MMHG | WEIGHT: 282.6 LBS | HEART RATE: 91 BPM | HEIGHT: 72 IN | SYSTOLIC BLOOD PRESSURE: 108 MMHG | BODY MASS INDEX: 38.28 KG/M2 | RESPIRATION RATE: 18 BRPM

## 2020-09-23 DIAGNOSIS — Z95.818 STATUS POST PLACEMENT OF IMPLANTABLE LOOP RECORDER: ICD-10-CM

## 2020-09-23 DIAGNOSIS — I48.91 ATRIAL FIBRILLATION, UNSPECIFIED TYPE (HCC): Primary | ICD-10-CM

## 2020-09-23 DIAGNOSIS — R00.1 BRADYCARDIA: ICD-10-CM

## 2020-09-23 PROCEDURE — 93000 ELECTROCARDIOGRAM COMPLETE: CPT | Performed by: NURSE PRACTITIONER

## 2020-09-23 PROCEDURE — 99214 OFFICE O/P EST MOD 30 MIN: CPT | Performed by: NURSE PRACTITIONER

## 2020-09-23 NOTE — PROGRESS NOTES
Chief Complaint   Patient presents with   [facility] Follow Up      A FIB ablation done on 9/18/20 - here for follow up -on the way home and patient broke out into a sweat and everything went black- he could still hear - by the time they got back to ER - he felt fine - they told him he was fine to go home    Chest Pain     feels like heartburn and hr will be in the 100's    Shortness of Breath     feels heart rate is all over the place      1. Have you been to the ER, urgent care clinic since your last visit? Hospitalized since your last visit? No     2. Have you seen or consulted any other health care providers outside of the [address] [place] [address] since your last visit? Include any pap smears or colon screening.   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

## 2020-09-30 ENCOUNTER — TELEPHONE (OUTPATIENT)
Dept: CARDIOLOGY CLINIC | Age: 50
End: 2020-09-30

## 2020-09-30 ENCOUNTER — OFFICE VISIT (OUTPATIENT)
Dept: SURGERY | Age: 50
End: 2020-09-30
Payer: COMMERCIAL

## 2020-09-30 VITALS
RESPIRATION RATE: 18 BRPM | TEMPERATURE: 97.5 F | BODY MASS INDEX: 38.29 KG/M2 | DIASTOLIC BLOOD PRESSURE: 95 MMHG | WEIGHT: 282.7 LBS | HEART RATE: 82 BPM | SYSTOLIC BLOOD PRESSURE: 140 MMHG | HEIGHT: 72 IN | OXYGEN SATURATION: 98 %

## 2020-09-30 DIAGNOSIS — K20.0 EOSINOPHILIC ESOPHAGITIS: Primary | ICD-10-CM

## 2020-09-30 PROCEDURE — 99214 OFFICE O/P EST MOD 30 MIN: CPT | Performed by: SURGERY

## 2020-09-30 RX ORDER — OMEPRAZOLE 40 MG/1
40 CAPSULE, DELAYED RELEASE ORAL 2 TIMES DAILY
Qty: 60 CAP | Refills: 3 | Status: SHIPPED | OUTPATIENT
Start: 2020-09-30 | End: 2021-02-12 | Stop reason: SDUPTHER

## 2020-09-30 NOTE — PROGRESS NOTES
1. Have you been to the ER, urgent care clinic since your last visit? Hospitalized since your last visit? No    2. Have you seen or consulted any other health care providers outside of the [address] [place] [address] since your last visit? Include any pap smears or colon screening.  new pt

## 2020-09-30 NOTE — PROGRESS NOTES
Tani Mcarthur is a 54 y.o. male who presents today with the following:  Chief Complaint   Patient presents with    GERD       HPI    72-year-old male who is known to me who back in March had an EGD which showed reflux esophagitis. He has had a number of other issues with dysrhythmia. He currently has an implantable loop recorder and underwent a ablation procedure 2 weeks ago. At that point he was placed on Eliquis 5 mg twice daily. He states his amount of reflux is considerably improved on Prilosec that he is taking twice daily. He still has a significant amount of bloating. He is having some breakthrough reflux either once or twice a week and occasionally will have it at night. He is here for scheduling of a possible repeat EGD.   Past Medical History:   Diagnosis Date    Acid reflux     Acute pain of right shoulder 1/30/2018    Fainting spell     Joint pain     Muscle ache     Muscle pain     Muscle weakness     Sinus problem     Urine troubles        Past Surgical History:   Procedure Laterality Date    ABDOMEN SURGERY PROC UNLISTED      bilat ing hernia repair with repeat R    ABDOMEN SURGERY PROC UNLISTED      umbilical hernia repair    CARDIAC SURG PROCEDURE UNLIST  05/2020    loop recorder insertion    HX AFIB ABLATION  09/18/2020    HX ENDOSCOPY  2020    HX ROTATOR CUFF REPAIR  04/03/2018    HX VASECTOMY  2017    INTRACARD ECHO, THER/DX INTERVENT N/A 9/18/2020    Intracardiac Echocardiogram performed by Larry [facility]ter [facility] at [facility] CARDIAC CATH LAB    LA EPHYS EVAL W/ABLATION SUPRAVENT ARRHYTHMIA N/A 9/18/2020    ABLATION SVT performed by Larry Richter MD at [facility] CARDIAC CATH LAB    LA EPHYS EVL TRNSPTL TX ATRIAL FIB ISOLAT PULM VEIN N/A 9/18/2020    ABLATION A-FIB  W COMPLETE EP STUDY performed by Larry Richter MD at [facility] CARDIAC CATH LAB    LA ICAR CATHETER ABLATION ARRHYTHMIA ADD ON N/A 9/18/2020    Ablation Svt/Vt Add On performed by Larry Richter MD at [facility] CARDIAC CATH LAB    SD INSERTION SUBQ CARDIAC RHYTHM MONITOR W/PRGRMG N/A 5/15/2020    LOOP RECORDER INSERT performed by Tiffanie Bush MD at [facility] CARDIAC CATH LAB    SD INTRACARDIAC ELECTROPHYSIOLOGIC 3D MAPPING N/A 9/18/2020    Ep 3d Mapping performed by Tiffanie Bush MD at [facility] CARDIAC CATH LAB       Social History     Socioeconomic History    Marital status:      Spouse name: Teresa Ignacio Number of children: Not on file    Years of education: Not on file    Highest education level: Not on file   Occupational History    Occupation:      Comment: [employer]   Social Needs    Financial resource strain: Not on file    Food insecurity     Worry: Not on file     Inability: Not on file   Schoology needs     Medical: Not on file     Non-medical: Not on file   Tobacco Use    Smoking status: Never Smoker    Smokeless tobacco: Never Used   Substance and Sexual Activity    Alcohol use: No    Drug use: No    Sexual activity: Yes     Partners: Female   Lifestyle    Physical activity     Days per week: Not on file     Minutes per session: Not on file    Stress: Not on file   Relationships    Social connections     Talks on phone: Not on file     Gets together: Not on file     Attends [employer] service: Not on file     Active member of club or organization: Not on file     Attends meetings of clubs or organizations: Not on file     Relationship status: Not on file    Intimate partner violence     Fear of current or ex partner: Not on file     Emotionally abused: Not on file     Physically abused: Not on file     Forced sexual activity: Not on file   Other Topics Concern     Service No    Blood Transfusions Not Asked    Caffeine Concern Not Asked    Occupational Exposure Not Asked    Hobby Hazards Not Asked    Sleep Concern Not Asked    Stress Concern Not Asked    Weight Concern Not Asked    Special Diet Not Asked    Back Care Not Asked    Exercise Not Asked    Bike Helmet Not Asked    Seat Belt Not Asked    Self-Exams Not Asked   Social History Narrative    Not on file       Family History   Problem Relation Age of Onset    No Known Problems Mother     Heart Attack Father     No Known Problems Sister     No Known Problems Brother     No Known Problems Maternal Grandmother     No Known Problems Maternal Grandfather     Heart Failure Paternal Grandmother     Cancer Paternal Grandfather         esophagus    No Known Problems Other        Allergies   Allergen Reactions    Hornet Venom Swelling     Carries Epi pen    Adhesive Rash     Dermabond       Current Outpatient Medications   Medication Sig    apixaban (Eliquis) 5 mg tablet Take 1 Tab by mouth two (2) times a day.  omeprazole (PRILOSEC) 40 mg capsule Take 40 mg by mouth two (2) times a day.  montelukast (SINGULAIR) 10 mg tablet Take 1 Tab by mouth daily.  fluticasone propionate (FLONASE) 50 mcg/actuation nasal spray 1 spray each nostril twice daily    pseudoephedrine (SUDAFED) 60 mg tablet Take 1 Tab by mouth every six (6) hours as needed for Congestion.  guaiFENesin (Mucinex) 1,200 mg Ta12 ER tablet Take 1 Tab by mouth two (2) times a day.  ketotifen (ZADITOR) 0.025 % (0.035 %) ophthalmic solution Administer 1 Drop to both eyes two (2) times daily as needed (itchy watery eyes).  albuterol (PROVENTIL HFA, VENTOLIN HFA, PROAIR HFA) 90 mcg/actuation inhaler Take 2 Puffs by inhalation every four (4) hours as needed for Wheezing.  docosahexanoic acid/epa (FISH OIL PO) Take  by mouth daily. No current facility-administered medications for this visit. The above histories, medications and allergies have been reviewed.     ROS    Visit Vitals  BP (!) 140/95 (BP 1 Location: Left arm, BP Patient Position: Sitting)   Pulse 82   Temp 97.5 °F (36.4 °C) (Temporal)   Resp 18   Ht 6' (1.829 m)   Wt 282 lb 11.2 oz (128.2 kg)   SpO2 98%   BMI 38.34 kg/m²     Physical Exam  Constitutional:       Appearance: He is obese. Cardiovascular:      Rate and Rhythm: Normal rate and regular rhythm. Pulmonary:      Effort: Pulmonary effort is normal.      Breath sounds: Normal breath sounds. Abdominal:      General: There is no distension. Palpations: Abdomen is soft. There is no mass. Comments: He does have some mild tenderness in the epigastrium and the left upper quadrant. 1. Eosinophilic esophagitis  Recommend EGD. Risks of the procedure were shared with the patient including the risks of aspiration or esophageal or gastric perforation. All questions were answered to the patient's satisfaction. We will schedule pending the okay from his cardiologist as well as the okay to hold the Eliquis preprocedure in anticipation of performing esophageal biopsies. We will continue his Prilosec until after the EGD and will determine if we need to make medication change at that point. The patient was counseled at length about the risks of susana Covid-19 during their perioperative period and any recovery window from their procedure. The patient was made aware that susana Covid-19  may worsen their prognosis for recovering from their procedure and lend to a higher morbidity and/or mortality risk. All material risks, benefits, and reasonable alternatives including postponing the procedure were discussed. The patient does  wish to proceed with the procedure at this time. Follow-up and Dispositions    · Return for post procedure.          Hafsa Padron MD

## 2020-09-30 NOTE — TELEPHONE ENCOUNTER
Is it okay for patient to hold eliquis. Please put notes in [facility] will be able to see response. Iain Baker Eosinophilic esophagitis  Recommend EGD. Risks of the procedure were shared with the patient including the risks of aspiration or esophageal or gastric perforation. All questions were answered to the patient's satisfaction.   We will schedule pending the okay from his cardiologist as well as the okay to hold the Eliquis preprocedure in anticipation of performing esophageal biopsies.      We will continue his Prilosec until after the EGD and will determine if we need to make medication change at that point

## 2020-10-27 ENCOUNTER — OFFICE VISIT (OUTPATIENT)
Dept: CARDIOLOGY CLINIC | Age: 50
End: 2020-10-27

## 2020-10-27 DIAGNOSIS — R55 SYNCOPE, UNSPECIFIED SYNCOPE TYPE: Primary | ICD-10-CM

## 2020-10-27 DIAGNOSIS — Z45.09 ENCOUNTER FOR LOOP RECORDER CHECK: ICD-10-CM

## 2020-10-29 ENCOUNTER — TELEPHONE (OUTPATIENT)
Dept: CARDIOLOGY CLINIC | Age: 50
End: 2020-10-29

## 2020-10-29 NOTE — TELEPHONE ENCOUNTER
Verified patient with two identifiers. I called pt to discuss a recent linq transmission that was labeled a symptom recording. He states he used his key to record a chest pain that hit him hard in the middle of his chest lasting about 30 sec. He states he has some chest discomfort at times. His wife wanted him to go to the ED but he refused. He did just have a upper GI procedure and wonders if the pain had something to do with that. I suggested if any chest pain returns he should go to the ED and get it checked out. He agreed and verbalized understanding.

## 2020-11-02 ENCOUNTER — DOCUMENTATION ONLY (OUTPATIENT)
Dept: SURGERY | Age: 50
End: 2020-11-02

## 2020-11-02 ENCOUNTER — OFFICE VISIT (OUTPATIENT)
Dept: SURGERY | Age: 50
End: 2020-11-02
Payer: COMMERCIAL

## 2020-11-02 VITALS
WEIGHT: 282.9 LBS | HEART RATE: 75 BPM | RESPIRATION RATE: 18 BRPM | TEMPERATURE: 96.9 F | OXYGEN SATURATION: 98 % | HEIGHT: 72 IN | SYSTOLIC BLOOD PRESSURE: 144 MMHG | BODY MASS INDEX: 38.32 KG/M2 | DIASTOLIC BLOOD PRESSURE: 82 MMHG

## 2020-11-02 DIAGNOSIS — R14.0 BLOATING: Primary | ICD-10-CM

## 2020-11-02 DIAGNOSIS — K20.0 EOSINOPHILIC ESOPHAGITIS: Primary | ICD-10-CM

## 2020-11-02 DIAGNOSIS — K20.0 EOSINOPHILIC ESOPHAGITIS: ICD-10-CM

## 2020-11-02 PROCEDURE — 99213 OFFICE O/P EST LOW 20 MIN: CPT | Performed by: SURGERY

## 2020-11-02 NOTE — PROGRESS NOTES
Niki Jacobson is a 54 y.o. male who presents today with the following:  Chief Complaint   Patient presents with    Post OP Follow Up       HPI    Mr. Irma More presents in follow-up after his EGD. He continues to do fairly well. He has had marked improvement of his symptoms but not resolution on the Prilosec. Findings at the time of the EGD were discussed with him. In particular the amount of retained food products were shared with him. He assures me that he is compliant and I certainly think this is the case. We also shared the findings of eosinophilic esophagitis.   Past Medical History:   Diagnosis Date    Acid reflux     Acute pain of right shoulder 1/30/2018    Atrial fibrillation (HCC) 2020    Fainting spell     Joint pain     Muscle ache     Muscle pain     Muscle weakness     Sinus problem     Urine troubles        Past Surgical History:   Procedure Laterality Date    ABDOMEN SURGERY PROC UNLISTED      bilat ing hernia repair with repeat R    ABDOMEN SURGERY PROC UNLISTED      umbilical hernia repair    CARDIAC SURG PROCEDURE UNLIST  05/2020    loop recorder insertion    HX AFIB ABLATION  09/18/2020    HX ENDOSCOPY  2020    HX ROTATOR CUFF REPAIR  04/03/2018    HX VASECTOMY  2017    INTRACARD ECHO, THER/DX INTERVENT N/A 9/18/2020    Intracardiac Echocardiogram performed by Yunior Cruz MD at [facility] CARDIAC CATH LAB    UT EPHYS EVAL W/ABLATION SUPRAVENT ARRHYTHMIA N/A 9/18/2020    ABLATION SVT performed by Yunior Cruz MD at [facility] CARDIAC CATH LAB    UT EPHYS EVL TRNSPTL TX ATRIAL FIB ISOLAT PULM VEIN N/A 9/18/2020    ABLATION A-FIB  W COMPLETE EP STUDY performed by Yunior Curz MD at [facility] CARDIAC CATH LAB    UT ICAR CATHETER ABLATION ARRHYTHMIA ADD ON N/A 9/18/2020    Ablation Svt/Vt Add On performed by Yunior Cruz MD at [facility] W/PRGRMG N/A 5/15/2020    LOOP RECORDER INSERT performed by Charlene Bello MD at [facility] CARDIAC CATH LAB    ME INTRACARDIAC ELECTROPHYSIOLOGIC 3D MAPPING N/A 9/18/2020    Ep 3d Mapping performed by Charlene Bello MD at \[facility]\"" CARDIAC CATH LAB       Social History     Socioeconomic History    Marital status:      Spouse name: Salinas Jorge Number of children: Not on file    Years of education: Not on file    Highest education level: Not on file   Occupational History    Occupation:      Comment: [employer]   Social Needs    Financial resource strain: Not on file    Food insecurity     Worry: Not on file     Inability: Not on file   [employer] needs     Medical: Not on file     Non-medical: Not on file   Tobacco Use    Smoking status: Never Smoker    Smokeless tobacco: Never Used   Substance and Sexual Activity    Alcohol use: No    Drug use: No    Sexual activity: Yes     Partners: Female   Lifestyle    Physical activity     Days per week: Not on file     Minutes per session: Not on file    Stress: Not on file   Relationships    Social connections     Talks on phone: Not on file     Gets together: Not on file     Attends Latter-day service: Not on file     Active member of club or organization: Not on file     Attends meetings of clubs or organizations: Not on file     Relationship status: Not on file    Intimate partner violence     Fear of current or ex partner: Not on file     Emotionally abused: Not on file     Physically abused: Not on file     Forced sexual activity: Not on file   Other Topics Concern     Service No    Blood Transfusions Not Asked    Caffeine Concern Not Asked    Occupational Exposure Not Asked    Hobby Hazards Not Asked    Sleep Concern Not Asked    Stress Concern Not Asked    Weight Concern Not Asked    Special Diet Not Asked    Back Care Not Asked    Exercise Not Asked    Bike Helmet Not Asked   [address] Not Asked    Self-Exams Not Asked   Social History Narrative    Not on file Family History   Problem Relation Age of Onset    No Known Problems Mother     Heart Attack Father     No Known Problems Sister     No Known Problems Brother     No Known Problems Maternal Grandmother     No Known Problems Maternal Grandfather     Heart Failure Paternal Grandmother     Cancer Paternal Grandfather         esophagus    No Known Problems Other        Allergies   Allergen Reactions    Hornet Venom Swelling     Carries Epi pen    Adhesive Rash     Dermabond       Current Outpatient Medications   Medication Sig    omeprazole (PRILOSEC) 40 mg capsule Take 1 Cap by mouth two (2) times a day.  apixaban (Eliquis) 5 mg tablet Take 1 Tab by mouth two (2) times a day.  montelukast (SINGULAIR) 10 mg tablet Take 1 Tab by mouth daily.  fluticasone propionate (FLONASE) 50 mcg/actuation nasal spray 1 spray each nostril twice daily    pseudoephedrine (SUDAFED) 60 mg tablet Take 1 Tab by mouth every six (6) hours as needed for Congestion.  guaiFENesin (Mucinex) 1,200 mg Ta12 ER tablet Take 1 Tab by mouth two (2) times a day.  ketotifen (ZADITOR) 0.025 % (0.035 %) ophthalmic solution Administer 1 Drop to both eyes two (2) times daily as needed (itchy watery eyes).  albuterol (PROVENTIL HFA, VENTOLIN HFA, PROAIR HFA) 90 mcg/actuation inhaler Take 2 Puffs by inhalation every four (4) hours as needed for Wheezing.  docosahexanoic acid/epa (FISH OIL PO) Take  by mouth daily. No current facility-administered medications for this visit. The above histories, medications and allergies have been reviewed. ROS    Visit Vitals  BP (!) 144/82 (BP 1 Location: Right arm, BP Patient Position: Sitting)   Pulse 75   Temp 96.9 °F (36.1 °C) (Temporal)   Resp 18   Ht 6' (1.829 m)   Wt 282 lb 14.4 oz (128.3 kg)   SpO2 98%   BMI 38.37 kg/m²     Physical Exam  Constitutional:       Appearance: He is obese. Neurological:      Mental Status: He is alert.          1. Bloating  Based on the amount of retained food products that were seen in the fact that he was compliant he has a sensation of bloating I think it may be worthwhile to obtain a gastric emptying study. This also may show that if he is retaining food products this may be contributing to his reflux.  - NM GASTRIC EMPTY STDY; Future    2. Eosinophilic esophagitis  We will continue him on the PPI. I would like to send him for allergy testing to determine if there is an allergen that is specifically causing this. Depending on what that shows we will determine if we need to refer him to GI for possible initiation of corticosteroids. I would like to see him back in approximately 6 weeks if the testing has been done. Follow-up and Dispositions    · Return in about 6 weeks (around 12/14/2020) for recheck.          Ravi Andrade MD

## 2020-11-02 NOTE — PATIENT INSTRUCTIONS
Eosinophilic Esophagitis: Care Instructions  Your Care Instructions     Esophagitis (say \"ee-sof-uh-JY-tus\") is irritation or inflammation in the esophagus. This is the tube that carries food from your throat to your stomach. In eosinophilic (say \"ee-uh-sin-uh-FILL-ick\") esophagitis, white blood cells called eosinophils are found where the esophagus is irritated or inflamed. These white blood cells are part of the immune system. The body sends them out in response to an allergic reaction. This has led experts to think that allergies-especially food allergies-may cause this condition. People who have it will often have other types of allergic reactions too, like hay fever, eczema, or asthma. The condition can occur in both children and adults. It's more common in males than in females. It may run in families. Symptoms in adults include trouble swallowing, pain in the chest or upper belly, and heartburn. Children who have the condition may refuse to eat and may not gain weight. They may have nausea, vomiting, or belly pain. Medicines are also used to reduce acid reflux and inflammation. The most common ones are:  Proton pump inhibitors (for adults). These medicines reduce acid reflux, so they may help relieve irritation. Corticosteroids. They can reduce inflammation. Some people need to take them long-term to keep their symptoms under control. Changing your diet may also help to treat the condition. If you have been tested and know your food allergens, you can just avoid those foods. If not, your doctor may suggest a special diet. Follow-up care is a key part of your treatment and safety. Be sure to make and go to all appointments, and call your doctor if you are having problems. It's also a good idea to know your test results and keep a list of the medicines you take. How can you care for yourself at home? · Work with a dietitian if you need a special diet.  He or she can help make sure that you get the nutrients you need to be healthy. · Talk to your doctor about managing allergies. · Be safe with medicines. Take your medicines exactly as prescribed. Call your doctor if you think you are having a problem with your medicine. When should you call for help? Call your doctor now or seek immediate medical care if:  · You have new or worse belly pain. · You vomit  Watch closely for changes in your health, and be sure to contact your doctor if:  · You have new or worse symptoms of reflux. · You have trouble or pain swallowing. · You are losing weight. · You do not get better as expected. Where can you learn more? Go to http://www.gray.com/  Enter [id number] in the search box to learn more about \"Eosinophilic Esophagitis: Care Instructions. \"  Current as of: April 15, 2020               Content Version: 12.6  © 4690-7582 [employer]. Care instructions adapted under license by [employer] (which disclaims liability or warranty for this information). If you have questions about a medical condition or this instruction, always ask your healthcare professional. [address] any warranty or liability for your use of this information.

## 2020-11-02 NOTE — PROGRESS NOTES
1. Have you been to the ER, urgent care clinic since your last visit? Hospitalized since your last visit? No    2. Have you seen or consulted any other health care providers outside of the [address] [place] [address] since your last visit? Include any pap smears or colon screening.  No

## 2020-11-03 DIAGNOSIS — R14.0 BLOATING: ICD-10-CM

## 2020-11-16 ENCOUNTER — OFFICE VISIT (OUTPATIENT)
Dept: CARDIOLOGY CLINIC | Age: 50
End: 2020-11-16
Payer: COMMERCIAL

## 2020-11-16 DIAGNOSIS — Z45.09 ENCOUNTER FOR LOOP RECORDER CHECK: ICD-10-CM

## 2020-11-16 DIAGNOSIS — R55 SYNCOPE, UNSPECIFIED SYNCOPE TYPE: Primary | ICD-10-CM

## 2020-11-16 PROCEDURE — 93298 REM INTERROG DEV EVAL SCRMS: CPT | Performed by: INTERNAL MEDICINE

## 2020-12-08 ENCOUNTER — DOCUMENTATION ONLY (OUTPATIENT)
Dept: SURGERY | Age: 50
End: 2020-12-08

## 2021-02-12 PROBLEM — I48.91 ATRIAL FIBRILLATION (HCC): Status: RESOLVED | Noted: 2020-09-18 | Resolved: 2021-02-12

## 2021-02-12 PROBLEM — M25.552 LEFT HIP PAIN: Status: ACTIVE | Noted: 2021-02-12

## 2021-02-12 PROBLEM — J45.20 MILD INTERMITTENT ASTHMA WITHOUT COMPLICATION: Status: ACTIVE | Noted: 2021-02-12

## 2021-02-15 ENCOUNTER — OFFICE VISIT (OUTPATIENT)
Dept: CARDIOLOGY CLINIC | Age: 51
End: 2021-02-15
Payer: COMMERCIAL

## 2021-02-15 DIAGNOSIS — R55 SYNCOPE, UNSPECIFIED SYNCOPE TYPE: Primary | ICD-10-CM

## 2021-02-15 DIAGNOSIS — Z45.09 ENCOUNTER FOR LOOP RECORDER CHECK: ICD-10-CM

## 2021-02-15 PROCEDURE — 93298 REM INTERROG DEV EVAL SCRMS: CPT | Performed by: INTERNAL MEDICINE

## 2021-03-19 ENCOUNTER — OFFICE VISIT (OUTPATIENT)
Dept: FAMILY MEDICINE CLINIC | Age: 51
End: 2021-03-19
Payer: COMMERCIAL

## 2021-03-19 VITALS
DIASTOLIC BLOOD PRESSURE: 83 MMHG | RESPIRATION RATE: 18 BRPM | TEMPERATURE: 96.1 F | BODY MASS INDEX: 39.68 KG/M2 | OXYGEN SATURATION: 98 % | SYSTOLIC BLOOD PRESSURE: 152 MMHG | HEART RATE: 71 BPM | HEIGHT: 72 IN | WEIGHT: 293 LBS

## 2021-03-19 DIAGNOSIS — G47.33 OSA (OBSTRUCTIVE SLEEP APNEA): Primary | ICD-10-CM

## 2021-03-19 PROCEDURE — 99213 OFFICE O/P EST LOW 20 MIN: CPT | Performed by: FAMILY MEDICINE

## 2021-03-19 NOTE — PROGRESS NOTES
1. Have you been to the ER, urgent care clinic since your last visit?  Hospitalized since your last visit?No    2. Have you seen or consulted any other health care providers outside of the [facility] since your last visit?  Include any pap smears or colon screening. No     Chief Complaint   Patient presents with   • Sleep Problem     review sleep study results     Visit Vitals  BP (!) 152/83 (BP 1 Location: Left arm, BP Patient Position: Sitting)   Pulse 71   Temp (!) 96.1 °F (35.6 °C) (Temporal)   Resp 18   Ht 6' (1.829 m)   Wt 293 lb (132.9 kg)   SpO2 98%   BMI 39.74 kg/m²

## 2021-03-19 NOTE — PROGRESS NOTES
Filemon Butt is a 52 y.o. male who presents with the following:  Chief Complaint   Patient presents with    Sleep Problem     review sleep study results       Patient is in to review the results of his sleep study which revealed he indeed did have sleep apnea and although he could only do a little over 2 hours of the study he had 10 events and spent a considerable amount of time with oxygen levels below 85% the patient has excessive daytime drowsiness including insomnia at night and he has intermittent hypertensive blood pressures. Allergies   Allergen Reactions    Hornet Venom Swelling     Carries Epi pen    Adhesive Rash     Dermabond       Current Outpatient Medications   Medication Sig    albuterol (PROVENTIL HFA, VENTOLIN HFA, PROAIR HFA) 90 mcg/actuation inhaler Take 2 Puffs by inhalation every four (4) hours as needed for Wheezing.  montelukast (SINGULAIR) 10 mg tablet Take 1 Tab by mouth daily.  omeprazole (PRILOSEC) 40 mg capsule Take 1 Cap by mouth two (2) times a day.  fluticasone propionate (FLONASE) 50 mcg/actuation nasal spray 1 spray each nostril twice daily    pseudoephedrine (SUDAFED) 60 mg tablet Take 1 Tab by mouth every six (6) hours as needed for Congestion.  guaiFENesin (Mucinex) 1,200 mg Ta12 ER tablet Take 1 Tab by mouth two (2) times a day.  ketotifen (ZADITOR) 0.025 % (0.035 %) ophthalmic solution Administer 1 Drop to both eyes two (2) times daily as needed (itchy watery eyes).  docosahexanoic acid/epa (FISH OIL PO) Take  by mouth daily. No current facility-administered medications for this visit.         Past Medical History:   Diagnosis Date    Acid reflux     Acute pain of right shoulder 1/30/2018    Atrial fibrillation (HCC) 2020    Fainting spell     Joint pain     Muscle ache     Muscle pain     Muscle weakness     Sinus problem     Urine troubles        Past Surgical History:   Procedure Laterality Date    HX AFIB ABLATION 09/18/2020    HX ENDOSCOPY  2020    HX ROTATOR CUFF REPAIR  04/03/2018    HX VASECTOMY  2017    INTRACARD ECHO, THER/DX INTERVENT N/A 9/18/2020    Intracardiac Echocardiogram performed by Ewelina Bryan MD at [facility] CARDIAC CATH LAB    DC ABDOMEN SURGERY PROC UNLISTED      bilat ing hernia repair with repeat R    DC ABDOMEN SURGERY PROC UNLISTED      umbilical hernia repair    DC CARDIAC SURG PROCEDURE UNLIST  05/2020    loop recorder insertion    DC EPHYS EVAL W/ABLATION SUPRAVENT ARRHYTHMIA N/A 9/18/2020    ABLATION SVT performed by Ewelina Bryan MD at [facility] CARDIAC CATH LAB    DC EPHYS EVL TRNSPTL TX ATRIAL FIB ISOLAT PULM VEIN N/A 9/18/2020    ABLATION A-FIB  W COMPLETE EP STUDY performed by Ewelina Bryan MD at [facility] CARDIAC CATH LAB    DC ICAR CATHETER ABLATION ARRHYTHMIA ADD ON N/A 9/18/2020    Ablation Svt/Vt Add On performed by Ewelina Bryan MD at [facility] CARDIAC CATH LAB    DC INSERTION [address] W/PRGRMG N/A 5/15/2020    LOOP RECORDER INSERT performed by Ewelina Bryan MD at [facility] CARDIAC CATH LAB    DC INTRACARDIAC ELECTROPHYSIOLOGIC 3D MAPPING N/A 9/18/2020    Ep 3d Mapping performed by Ewelina Bryan MD at [facility] CARDIAC CATH LAB       Family History   Problem Relation Age of Onset    No Known Problems Mother     Heart Attack Father     No Known Problems Sister     No Known Problems Brother     No Known Problems Maternal Grandmother     No Known Problems Maternal Grandfather     Heart Failure Paternal Grandmother     Cancer Paternal Grandfather         esophagus    No Known Problems Other        Social History     Socioeconomic History    Marital status:      Spouse name: Emory Shin Number of children: Not on file    Years of education: Not on file    Highest education level: Not on file   Occupational History    Occupation:      Comment: [employer]   Tobacco Use    Smoking status: Never Smoker    Smokeless tobacco: Never Used   Substance and Sexual Activity    Alcohol use: No    Drug use: No    Sexual activity: Yes     Partners: Female   Other Topics Concern     Service No       Review of Systems   Constitutional: Negative for chills, fever, malaise/fatigue and weight loss. HENT: Negative for congestion, hearing loss, sore throat and tinnitus. Eyes: Negative for blurred vision, pain and discharge. Respiratory: Negative for cough, shortness of breath and wheezing. Cardiovascular: Negative for chest pain, palpitations, orthopnea, claudication and leg swelling. Gastrointestinal: Negative for abdominal pain, constipation and heartburn. Genitourinary: Negative for dysuria, frequency and urgency. Musculoskeletal: Negative for falls, joint pain and myalgias. Skin: Negative for itching and rash. Neurological: Negative for dizziness, tingling, tremors and headaches. Endo/Heme/Allergies: Negative for environmental allergies and polydipsia. Psychiatric/Behavioral: Negative for depression and substance abuse. The patient has insomnia (With daytime drowsiness). The patient is not nervous/anxious. Visit Vitals  BP (!) 152/83 (BP 1 Location: Left arm, BP Patient Position: Sitting)   Pulse 71   Temp (!) 96.1 °F (35.6 °C) (Temporal)   Resp 18   Ht 6' (1.829 m)   Wt 293 lb (132.9 kg)   SpO2 98%   BMI 39.74 kg/m²     Physical Exam  Vitals signs reviewed. Constitutional:       General: He is not in acute distress. Appearance: Normal appearance. He is obese. He is not ill-appearing. HENT:      Head: Normocephalic and atraumatic. Right Ear: Tympanic membrane, ear canal and external ear normal.      Left Ear: Tympanic membrane, ear canal and external ear normal.      Nose: Nose normal. No congestion or rhinorrhea. Mouth/Throat:      Mouth: Mucous membranes are moist.      Pharynx: No oropharyngeal exudate or posterior oropharyngeal erythema.    Eyes:      Extraocular Movements: Extraocular movements intact. Conjunctiva/sclera: Conjunctivae normal.      Pupils: Pupils are equal, round, and reactive to light. Comments: Pupil iris and anterior chamber normal.   Neck:      Musculoskeletal: Normal range of motion and neck supple. Trachea: No tracheal deviation. Cardiovascular:      Rate and Rhythm: Normal rate and regular rhythm. Pulses: Normal pulses. Heart sounds: Normal heart sounds. No murmur. No friction rub. No gallop. Pulmonary:      Effort: Pulmonary effort is normal. No respiratory distress. Breath sounds: Normal breath sounds. No wheezing, rhonchi or rales. Chest:      Chest wall: No tenderness. Abdominal:      General: Bowel sounds are normal. There is no distension. Palpations: Abdomen is soft. There is no mass. Tenderness: There is no abdominal tenderness. There is no guarding or rebound. Hernia: No hernia is present. Musculoskeletal: Normal range of motion. General: No tenderness. Right lower leg: No edema. Left lower leg: No edema. Lymphadenopathy:      Cervical: No cervical adenopathy. Skin:     General: Skin is warm and dry. Findings: No erythema or rash. Neurological:      General: No focal deficit present. Mental Status: He is alert. Cranial Nerves: No cranial nerve deficit. Motor: No abnormal muscle tone. Deep Tendon Reflexes: Reflexes are normal and symmetric. Reflexes normal.      Comments: Cranial nerves II through XII intact sensory and motor. Deep tendon reflexes in the biceps triceps knee and ankle are normal and bilaterally symmetrical.   Psychiatric:         Mood and Affect: Mood normal.         Behavior: Behavior normal.         Thought Content: Thought content normal.         Judgment: Judgment normal.           ICD-10-CM ICD-9-CM    1.  OSCAR (obstructive sleep apnea)  G47.33 327.23      Have suggested the patient start with CPAP with a self-regulating unit with all the pieces that are needed to have it function properly including face mask and tubing and a heating unit to humidify the air. No orders of the defined types were placed in this encounter.           Jaqueline Wilder MD

## 2021-04-23 ENCOUNTER — OFFICE VISIT (OUTPATIENT)
Dept: FAMILY MEDICINE CLINIC | Age: 51
End: 2021-04-23
Payer: COMMERCIAL

## 2021-04-23 VITALS
OXYGEN SATURATION: 98 % | HEIGHT: 72 IN | DIASTOLIC BLOOD PRESSURE: 86 MMHG | HEART RATE: 62 BPM | RESPIRATION RATE: 18 BRPM | TEMPERATURE: 96.8 F | BODY MASS INDEX: 38.93 KG/M2 | SYSTOLIC BLOOD PRESSURE: 139 MMHG | WEIGHT: 287.4 LBS

## 2021-04-23 DIAGNOSIS — K57.90 DIVERTICULOSIS: ICD-10-CM

## 2021-04-23 DIAGNOSIS — K57.92 DIVERTICULITIS: ICD-10-CM

## 2021-04-23 DIAGNOSIS — E66.01 SEVERE OBESITY (HCC): Primary | ICD-10-CM

## 2021-04-23 DIAGNOSIS — K92.1: ICD-10-CM

## 2021-04-23 DIAGNOSIS — R10.13 EPIGASTRIC PAIN: ICD-10-CM

## 2021-04-23 PROCEDURE — 99214 OFFICE O/P EST MOD 30 MIN: CPT | Performed by: FAMILY MEDICINE

## 2021-04-23 RX ORDER — CIPROFLOXACIN 500 MG/1
500 TABLET ORAL 2 TIMES DAILY
Qty: 20 TAB | Refills: 0 | Status: SHIPPED | OUTPATIENT
Start: 2021-04-23 | End: 2021-05-03

## 2021-04-23 RX ORDER — METRONIDAZOLE 500 MG/1
500 TABLET ORAL 3 TIMES DAILY
Qty: 30 TAB | Refills: 0 | Status: SHIPPED | OUTPATIENT
Start: 2021-04-23 | End: 2021-05-03

## 2021-04-23 NOTE — PROGRESS NOTES
Gasper Montalvo is a 52 y.o. male who presents with the following:  Chief Complaint   Patient presents with    Abdominal Pain    Melena     blood in stool       Patient with possible gastroparesis a great deal of bloating but lately started having epigastric and left-sided abdominal pain associated with bright red blood in the stool. Allergies   Allergen Reactions    Hornet Venom Swelling     Carries Epi pen    Adhesive Rash     Dermabond       Current Outpatient Medications   Medication Sig    metroNIDAZOLE (FLAGYL) 500 mg tablet Take 1 Tab by mouth three (3) times daily for 10 days.  ciprofloxacin HCl (CIPRO) 500 mg tablet Take 1 Tab by mouth two (2) times a day for 10 days.  albuterol (PROVENTIL HFA, VENTOLIN HFA, PROAIR HFA) 90 mcg/actuation inhaler Take 2 Puffs by inhalation every four (4) hours as needed for Wheezing.  montelukast (SINGULAIR) 10 mg tablet Take 1 Tab by mouth daily.  omeprazole (PRILOSEC) 40 mg capsule Take 1 Cap by mouth two (2) times a day.  fluticasone propionate (FLONASE) 50 mcg/actuation nasal spray 1 spray each nostril twice daily    pseudoephedrine (SUDAFED) 60 mg tablet Take 1 Tab by mouth every six (6) hours as needed for Congestion.  guaiFENesin (Mucinex) 1,200 mg Ta12 ER tablet Take 1 Tab by mouth two (2) times a day.  ketotifen (ZADITOR) 0.025 % (0.035 %) ophthalmic solution Administer 1 Drop to both eyes two (2) times daily as needed (itchy watery eyes).  docosahexanoic acid/epa (FISH OIL PO) Take  by mouth daily. No current facility-administered medications for this visit.         Past Medical History:   Diagnosis Date    Acid reflux     Acute pain of right shoulder 1/30/2018    Atrial fibrillation (HCC) 2020    Fainting spell     Joint pain     Muscle ache     Muscle pain     Muscle weakness     Sinus problem     Urine troubles        Past Surgical History:   Procedure Laterality Date    HX AFIB ABLATION  09/18/2020    HX ENDOSCOPY  2020    HX ROTATOR CUFF REPAIR  04/03/2018    HX VASECTOMY  2017    INTRACARD ECHO, THER/DX INTERVENT N/A 9/18/2020    Intracardiac Echocardiogram performed by Eric Gomez MD at [facility] CARDIAC CATH LAB    NC ABDOMEN SURGERY PROC UNLISTED      bilat ing hernia repair with repeat R    NC ABDOMEN SURGERY PROC UNLISTED      umbilical hernia repair    NC CARDIAC SURG PROCEDURE UNLIST  05/2020    loop recorder insertion    NC EPHYS EVAL W/ABLATION SUPRAVENT ARRHYTHMIA N/A 9/18/2020    ABLATION SVT performed by Eric Gomez MD at [facility] CARDIAC CATH LAB    NC EPHYS EVL TRNSPTL TX ATRIAL FIB ISOLAT PULM VEIN N/A 9/18/2020    ABLATION A-FIB  W COMPLETE EP STUDY performed by Eric Gomez MD at [facility] CARDIAC CATH LAB    NC ICAR CATHETER ABLATION ARRHYTHMIA ADD ON N/A 9/18/2020    Ablation Svt/Vt Add On performed by Eric Gomez MD at [facility] CARDIAC CATH LAB    NC INSERTION [address] W/PRGRMG N/A 5/15/2020    LOOP RECORDER INSERT performed by Eric Gomez MD at [facility] CARDIAC CATH LAB    NC INTRACARDIAC ELECTROPHYSIOLOGIC 3D MAPPING N/A 9/18/2020    Ep 3d Mapping performed by Eric Gomez MD at [facility] CARDIAC CATH LAB       Family History   Problem Relation Age of Onset    No Known Problems Mother     Heart Attack Father     No Known Problems Sister     No Known Problems Brother     No Known Problems Maternal Grandmother     No Known Problems Maternal Grandfather     Heart Failure Paternal Grandmother     Cancer Paternal Grandfather         esophagus    No Known Problems Other        Social History     Socioeconomic History    Marital status:      Spouse name: Nik Carbajal Number of children: Not on file    Years of education: Not on file    Highest education level: Not on file   Occupational History    Occupation:      Comment: [employer]   Tobacco Use    Smoking status: Never Smoker    Smokeless tobacco: Never Used Substance and Sexual Activity    Alcohol use: No    Drug use: No    Sexual activity: Yes     Partners: Female   Other Topics Concern     Service No       Review of Systems   Constitutional: Negative for chills, fever, malaise/fatigue and weight loss. HENT: Negative for congestion, hearing loss, sore throat and tinnitus. Eyes: Negative for blurred vision, pain and discharge. Respiratory: Negative for cough, shortness of breath and wheezing. Cardiovascular: Negative for chest pain, palpitations, orthopnea, claudication and leg swelling. Gastrointestinal: Positive for abdominal pain and blood in stool. Negative for constipation and heartburn. Genitourinary: Negative for dysuria, frequency and urgency. Musculoskeletal: Negative for falls, joint pain and myalgias. Skin: Negative for itching and rash. Neurological: Negative for dizziness, tingling, tremors and headaches. Endo/Heme/Allergies: Negative for environmental allergies and polydipsia. Psychiatric/Behavioral: Negative for depression and substance abuse. The patient is not nervous/anxious. Visit Vitals  /86 (BP 1 Location: Left arm, BP Patient Position: Sitting)   Pulse 62   Temp 96.8 °F (36 °C)   Resp 18   Ht 6' (1.829 m)   Wt 287 lb 6.4 oz (130.4 kg)   SpO2 98%   BMI 38.98 kg/m²     Physical Exam  Vitals signs reviewed. Constitutional:       General: He is not in acute distress. Appearance: Normal appearance. He is obese. He is not ill-appearing. HENT:      Head: Normocephalic and atraumatic. Right Ear: Tympanic membrane, ear canal and external ear normal.      Left Ear: Tympanic membrane, ear canal and external ear normal.      Nose: Nose normal. No congestion or rhinorrhea. Mouth/Throat:      Mouth: Mucous membranes are moist.      Pharynx: No oropharyngeal exudate or posterior oropharyngeal erythema. Eyes:      Extraocular Movements: Extraocular movements intact.       Conjunctiva/sclera: Conjunctivae normal.      Pupils: Pupils are equal, round, and reactive to light. Comments: Pupil iris and anterior chamber normal.   Neck:      Musculoskeletal: Normal range of motion and neck supple. Trachea: No tracheal deviation. Cardiovascular:      Rate and Rhythm: Normal rate and regular rhythm. Pulses: Normal pulses. Heart sounds: Normal heart sounds. No murmur. No friction rub. No gallop. Pulmonary:      Effort: Pulmonary effort is normal. No respiratory distress. Breath sounds: Normal breath sounds. No wheezing, rhonchi or rales. Chest:      Chest wall: No tenderness. Abdominal:      General: Bowel sounds are normal. There is no distension. Palpations: Abdomen is soft. There is no mass. Tenderness: There is no abdominal tenderness. There is no guarding or rebound. Hernia: No hernia is present. Musculoskeletal: Normal range of motion. General: No tenderness. Right lower leg: No edema. Left lower leg: No edema. Lymphadenopathy:      Cervical: No cervical adenopathy. Skin:     General: Skin is warm and dry. Findings: No erythema or rash. Neurological:      General: No focal deficit present. Mental Status: He is alert and oriented to person, place, and time. Cranial Nerves: No cranial nerve deficit. Motor: No abnormal muscle tone. Deep Tendon Reflexes: Reflexes are normal and symmetric. Reflexes normal.      Comments: Cranial nerves II through XII intact sensory and motor. Deep tendon reflexes in the biceps triceps knee and ankle are normal and bilaterally symmetrical.   Psychiatric:         Mood and Affect: Mood normal.         Behavior: Behavior normal.         Thought Content: Thought content normal.         Judgment: Judgment normal.           ICD-10-CM ICD-9-CM    1. Severe obesity ([facility]  E66.01 278.01    2. Diverticulosis  K57.90 562.10    3. Blood in stool, fei  K92.1 578.1 REFERRAL TO SURGERY   4. Diverticulitis  K57.92 562.11 metroNIDAZOLE (FLAGYL) 500 mg tablet      ciprofloxacin HCl (CIPRO) 500 mg tablet       Orders Placed This Encounter    REFERRAL TO SURGERY     Referral Priority:   Routine     Referral Type:   Consultation     Referral Reason:   Specialty Services Required     Referred to Provider:   Ann Riojas MD     Number of Visits Requested:   1    metroNIDAZOLE (FLAGYL) 500 mg tablet     Sig: Take 1 Tab by mouth three (3) times daily for 10 days. Dispense:  30 Tab     Refill:  0    ciprofloxacin HCl (CIPRO) 500 mg tablet     Sig: Take 1 Tab by mouth two (2) times a day for 10 days.      Dispense:  20 Tab     Refill:  Giovanna Martin MD

## 2021-04-24 LAB
ALBUMIN SERPL-MCNC: 4.1 G/DL (ref 3.5–5)
ALBUMIN/GLOB SERPL: 1.3 {RATIO} (ref 1.1–2.2)
ALP SERPL-CCNC: 108 U/L (ref 45–117)
ALT SERPL-CCNC: 51 U/L (ref 12–78)
ANION GAP SERPL CALC-SCNC: 5 MMOL/L (ref 5–15)
AST SERPL-CCNC: 22 U/L (ref 15–37)
BASOPHILS # BLD: 0.1 K/UL (ref 0–0.1)
BASOPHILS NFR BLD: 1 % (ref 0–1)
BILIRUB SERPL-MCNC: 0.4 MG/DL (ref 0.2–1)
BUN SERPL-MCNC: 11 MG/DL (ref 6–20)
BUN/CREAT SERPL: 12 (ref 12–20)
CALCIUM SERPL-MCNC: 9.1 MG/DL (ref 8.5–10.1)
CHLORIDE SERPL-SCNC: 105 MMOL/L (ref 97–108)
CO2 SERPL-SCNC: 30 MMOL/L (ref 21–32)
CREAT SERPL-MCNC: 0.94 MG/DL (ref 0.7–1.3)
DIFFERENTIAL METHOD BLD: ABNORMAL
EOSINOPHIL # BLD: 0.7 K/UL (ref 0–0.4)
EOSINOPHIL NFR BLD: 8 % (ref 0–7)
ERYTHROCYTE [DISTWIDTH] IN BLOOD BY AUTOMATED COUNT: 12.6 % (ref 11.5–14.5)
GLOBULIN SER CALC-MCNC: 3.2 G/DL (ref 2–4)
GLUCOSE SERPL-MCNC: 83 MG/DL (ref 65–100)
HCT VFR BLD AUTO: 49.2 % (ref 36.6–50.3)
HGB BLD-MCNC: 15.9 G/DL (ref 12.1–17)
IMM GRANULOCYTES # BLD AUTO: 0.1 K/UL (ref 0–0.04)
IMM GRANULOCYTES NFR BLD AUTO: 1 % (ref 0–0.5)
LYMPHOCYTES # BLD: 2.7 K/UL (ref 0.8–3.5)
LYMPHOCYTES NFR BLD: 30 % (ref 12–49)
MCH RBC QN AUTO: 28.6 PG (ref 26–34)
MCHC RBC AUTO-ENTMCNC: 32.3 G/DL (ref 30–36.5)
MCV RBC AUTO: 88.5 FL (ref 80–99)
MONOCYTES # BLD: 0.6 K/UL (ref 0–1)
MONOCYTES NFR BLD: 7 % (ref 5–13)
NEUTS SEG # BLD: 4.9 K/UL (ref 1.8–8)
NEUTS SEG NFR BLD: 53 % (ref 32–75)
NRBC # BLD: 0 K/UL (ref 0–0.01)
NRBC BLD-RTO: 0 PER 100 WBC
PLATELET # BLD AUTO: 299 K/UL (ref 150–400)
PMV BLD AUTO: 9.3 FL (ref 8.9–12.9)
POTASSIUM SERPL-SCNC: 4.2 MMOL/L (ref 3.5–5.1)
PROT SERPL-MCNC: 7.3 G/DL (ref 6.4–8.2)
RBC # BLD AUTO: 5.56 M/UL (ref 4.1–5.7)
SODIUM SERPL-SCNC: 140 MMOL/L (ref 136–145)
WBC # BLD AUTO: 9 K/UL (ref 4.1–11.1)

## 2021-04-27 ENCOUNTER — TELEPHONE (OUTPATIENT)
Dept: FAMILY MEDICINE CLINIC | Age: 51
End: 2021-04-27

## 2021-04-27 NOTE — TELEPHONE ENCOUNTER
Pt is unable to get in to see Dr. Lenin Nj until 05/24/21. Pt is still having issues. Can they get a referral to see someone in [place]?

## 2021-04-27 NOTE — TELEPHONE ENCOUNTER
Called wife gave her Dr. Rogelio Liu number [phone], told her anyone there would be good and to call me back with who so I could send info over for her.

## 2021-05-06 ENCOUNTER — DOCUMENTATION ONLY (OUTPATIENT)
Dept: FAMILY MEDICINE CLINIC | Age: 51
End: 2021-05-06

## 2021-05-06 NOTE — PROGRESS NOTES
Faxed demo sheet,ins card,bw & last ov note to Dr. Sarath Henry office (per patients wife) Pt has a appt with Gastro 5/7/21 at 3 pm

## 2021-05-12 ENCOUNTER — TRANSCRIBE ORDER (OUTPATIENT)
Dept: SCHEDULING | Age: 51
End: 2021-05-12

## 2021-05-12 DIAGNOSIS — K20.0 EOSINOPHILIC ESOPHAGITIS: Primary | ICD-10-CM

## 2021-05-12 DIAGNOSIS — K59.00 CONSTIPATION: ICD-10-CM

## 2021-05-12 DIAGNOSIS — K92.1 HEMATOCHEZIA: ICD-10-CM

## 2021-05-12 DIAGNOSIS — R10.13 EPIGASTRIC ABDOMINAL PAIN: ICD-10-CM

## 2021-08-16 ENCOUNTER — OFFICE VISIT (OUTPATIENT)
Dept: CARDIOLOGY CLINIC | Age: 51
End: 2021-08-16
Payer: COMMERCIAL

## 2021-08-16 DIAGNOSIS — R55 SYNCOPE, UNSPECIFIED SYNCOPE TYPE: Primary | ICD-10-CM

## 2021-08-16 DIAGNOSIS — Z45.09 ENCOUNTER FOR LOOP RECORDER CHECK: ICD-10-CM

## 2021-08-16 PROCEDURE — 93298 REM INTERROG DEV EVAL SCRMS: CPT | Performed by: INTERNAL MEDICINE

## 2021-08-17 ENCOUNTER — OFFICE VISIT (OUTPATIENT)
Dept: FAMILY MEDICINE CLINIC | Age: 51
End: 2021-08-17
Payer: COMMERCIAL

## 2021-08-17 VITALS
RESPIRATION RATE: 20 BRPM | HEART RATE: 68 BPM | HEIGHT: 72 IN | OXYGEN SATURATION: 99 % | WEIGHT: 284.6 LBS | DIASTOLIC BLOOD PRESSURE: 83 MMHG | SYSTOLIC BLOOD PRESSURE: 126 MMHG | BODY MASS INDEX: 38.55 KG/M2 | TEMPERATURE: 98.7 F

## 2021-08-17 DIAGNOSIS — J30.9 CHRONIC ALLERGIC RHINITIS: ICD-10-CM

## 2021-08-17 DIAGNOSIS — J45.20 MILD INTERMITTENT ASTHMA WITHOUT COMPLICATION: ICD-10-CM

## 2021-08-17 DIAGNOSIS — N13.8 BPH WITH OBSTRUCTION/LOWER URINARY TRACT SYMPTOMS: ICD-10-CM

## 2021-08-17 DIAGNOSIS — K20.0 EOSINOPHILIC ESOPHAGITIS: ICD-10-CM

## 2021-08-17 DIAGNOSIS — R06.2 WHEEZING: ICD-10-CM

## 2021-08-17 DIAGNOSIS — M54.12 CERVICAL RADICULOPATHY AT C7: ICD-10-CM

## 2021-08-17 DIAGNOSIS — I65.23 BILATERAL CAROTID ARTERY STENOSIS: Primary | ICD-10-CM

## 2021-08-17 DIAGNOSIS — H10.13 ALLERGIC CONJUNCTIVITIS OF BOTH EYES: ICD-10-CM

## 2021-08-17 DIAGNOSIS — Z11.59 ENCOUNTER FOR HEPATITIS C SCREENING TEST FOR LOW RISK PATIENT: ICD-10-CM

## 2021-08-17 DIAGNOSIS — E66.01 SEVERE OBESITY (HCC): ICD-10-CM

## 2021-08-17 DIAGNOSIS — K21.00 GASTROESOPHAGEAL REFLUX DISEASE WITH ESOPHAGITIS WITHOUT HEMORRHAGE: ICD-10-CM

## 2021-08-17 DIAGNOSIS — N40.1 BPH WITH OBSTRUCTION/LOWER URINARY TRACT SYMPTOMS: ICD-10-CM

## 2021-08-17 DIAGNOSIS — Z91.038 HYMENOPTERA ALLERGY: ICD-10-CM

## 2021-08-17 PROCEDURE — 36415 COLL VENOUS BLD VENIPUNCTURE: CPT | Performed by: FAMILY MEDICINE

## 2021-08-17 PROCEDURE — 99214 OFFICE O/P EST MOD 30 MIN: CPT | Performed by: FAMILY MEDICINE

## 2021-08-17 RX ORDER — FLUTICASONE PROPIONATE 50 MCG
SPRAY, SUSPENSION (ML) NASAL
Qty: 3 BOTTLE | Refills: 1 | Status: SHIPPED | OUTPATIENT
Start: 2021-08-17 | End: 2022-02-18 | Stop reason: SDUPTHER

## 2021-08-17 RX ORDER — MONTELUKAST SODIUM 10 MG/1
10 TABLET ORAL DAILY
Qty: 90 TABLET | Refills: 1 | Status: SHIPPED | OUTPATIENT
Start: 2021-08-17 | End: 2022-02-18 | Stop reason: SDUPTHER

## 2021-08-17 RX ORDER — GUAIFENESIN 1200 MG/1
1200 TABLET, EXTENDED RELEASE ORAL 2 TIMES DAILY
Qty: 20 TABLET | Refills: 1 | Status: SHIPPED | OUTPATIENT
Start: 2021-08-17

## 2021-08-17 RX ORDER — KETOTIFEN FUMARATE 0.35 MG/ML
1 SOLUTION/ DROPS OPHTHALMIC
Qty: 10 ML | Refills: 3 | Status: SHIPPED | OUTPATIENT
Start: 2021-08-17

## 2021-08-17 RX ORDER — FAMOTIDINE 40 MG/1
40 TABLET, FILM COATED ORAL
COMMUNITY
Start: 2021-06-28 | End: 2022-02-18 | Stop reason: SDUPTHER

## 2021-08-17 RX ORDER — PANTOPRAZOLE SODIUM 40 MG/1
40 TABLET, DELAYED RELEASE ORAL 2 TIMES DAILY
COMMUNITY
Start: 2021-08-05 | End: 2022-02-18 | Stop reason: SDUPTHER

## 2021-08-17 RX ORDER — EPINEPHRINE 0.3 MG/.3ML
0.3 INJECTION SUBCUTANEOUS
Qty: 1 SYRINGE | Refills: 0 | Status: SHIPPED | OUTPATIENT
Start: 2021-08-17 | End: 2021-08-17

## 2021-08-17 RX ORDER — ALBUTEROL SULFATE 90 UG/1
2 AEROSOL, METERED RESPIRATORY (INHALATION)
Qty: 1 INHALER | Refills: 3 | Status: SHIPPED | OUTPATIENT
Start: 2021-08-17 | End: 2022-02-18 | Stop reason: SDUPTHER

## 2021-08-17 NOTE — PROGRESS NOTES
Miko Farah is a 52 y.o. male who presents with the following:  Chief Complaint   Patient presents with    Carotid Artery Stenosis     Severe obesity    GERD    Neck Pain     C7 cervical radiculopathy    Benign Prostatic Hypertrophy    Breathing Problem     Asthma       The patient has had no problems with TIAs from his carotid artery stenosis but he is severely obese and needs to work on weight reduction. Patient does have rather remarkable GERD and does have an eosinophilic esophagitis for which he is seeing an allergist to see if they can ascertain what he may be allergic to that aggravating this. The patient does have neck pain from C7 cervical radiculopathy but currently that is doing fairly well although it does tend to pop up from time to time. Patient does have BPH with obstructive lower urinary tract symptoms but we are holding off on his PSA at this time until he is 52 unless he develops worsening problems. The patient is mainly affected by the decreased stream and frequency and sometimes urgency. Patient does have asthma all at this time it is quiet and he is having no coughing and wheezing from this. He does need a refill of his montelukast and his ProAir however. Patient also has chronic allergic rhinitis for which he uses fluticasone. The patient evidently has had an allergy to hymenoptera stings and he needs an EpiPen refilled. Allergies   Allergen Reactions    Hornet Venom Swelling     Carries Epi pen    Adhesive Rash     Dermabond       Current Outpatient Medications   Medication Sig    famotidine (PEPCID) 40 mg tablet Take 40 mg by mouth nightly.  pantoprazole (PROTONIX) 40 mg tablet Take 40 mg by mouth two (2) times a day.  ketotifen (ZADITOR) 0.025 % (0.035 %) ophthalmic solution Administer 1 Drop to both eyes two (2) times daily as needed (itchy watery eyes).  guaiFENesin (Mucinex) 1,200 mg Ta12 ER tablet Take 1 Tablet by mouth two (2) times a day.     fluticasone propionate (FLONASE) 50 mcg/actuation nasal spray 1 spray each nostril twice daily    montelukast (SINGULAIR) 10 mg tablet Take 1 Tablet by mouth daily.  albuterol (PROVENTIL HFA, VENTOLIN HFA, PROAIR HFA) 90 mcg/actuation inhaler Take 2 Puffs by inhalation every four (4) hours as needed for Wheezing.  EPINEPHrine (EpiPen) 0.3 mg/0.3 mL injection 0.3 mL by IntraMUSCular route once as needed for Allergic Response for up to 1 dose.  pseudoephedrine (SUDAFED) 60 mg tablet Take 1 Tab by mouth every six (6) hours as needed for Congestion.  docosahexanoic acid/epa (FISH OIL PO) Take  by mouth daily. No current facility-administered medications for this visit.        Past Medical History:   Diagnosis Date    Acid reflux     Acute pain of right shoulder 1/30/2018    Atrial fibrillation (HCC) 2020    Fainting spell     Joint pain     Muscle ache     Muscle pain     Muscle weakness     Sinus problem     Urine troubles        Past Surgical History:   Procedure Laterality Date    HX AFIB ABLATION  09/18/2020    HX ENDOSCOPY  2020    HX ROTATOR CUFF REPAIR  04/03/2018    HX VASECTOMY  2017    INTRACARD ECHO, THER/DX INTERVENT N/A 9/18/2020    Intracardiac Echocardiogram performed by [facility] CARDIAC CATH LAB    CA ABDOMEN SURGERY PROC UNLISTED      bilat ing hernia repair with repeat R    CA ABDOMEN SURGERY PROC UNLISTED      umbilical hernia repair    CA CARDIAC SURG PROCEDURE UNLIST  05/2020    loop recorder insertion    CA EPHYS EVAL W/ABLATION SUPRAVENT ARRHYTHMIA N/A 9/18/2020    ABLATION SVT performed by Dana Bob MD at [facility] CARDIAC CATH LAB    CA EPHYS EVL TRNSPTL TX ATRIAL FIB ISOLAT PULM VEIN N/A 9/18/2020    ABLATION A-FIB  W COMPLETE EP STUDY performed by Dana Bob MD at [facility] CARDIAC CATH LAB    CA ICAR CATHETER ABLATION ARRHYTHMIA ADD ON N/A 9/18/2020    Ablation Svt/Vt Add On performed by Dana Bob MD at [facility] CARDIAC CATH LAB    AR INSERTION SUBQ CARDIAC RHYTHM MONITOR W/PRGRMG N/A 5/15/2020    LOOP RECORDER INSERT performed by Luly Magana MD at [facility] CARDIAC CATH LAB    AR INTRACARDIAC ELECTROPHYSIOLOGIC 3D MAPPING N/A 9/18/2020    Ep 3d Mapping performed by Luly Magana MD at [facility] CARDIAC CATH LAB       Family History   Problem Relation Age of Onset    No Known Problems Mother     Heart Attack Father     No Known Problems Sister     No Known Problems Brother     No Known Problems Maternal Grandmother     No Known Problems Maternal Grandfather     Heart Failure Paternal Grandmother     Cancer Paternal Grandfather         esophagus    No Known Problems Other        Social History     Socioeconomic History    Marital status:      Spouse name: Nissa Rodriguez Number of children: Not on file    Years of education: Not on file    Highest education level: Not on file   Occupational History    Occupation:      Comment: [employer]   Tobacco Use    Smoking status: Never Smoker    Smokeless tobacco: Never Used   Substance and Sexual Activity    Alcohol use: No    Drug use: No    Sexual activity: Yes     Partners: Female   Other Topics Concern     Service No     Social Determinants of Health     Financial Resource Strain:     Difficulty of Paying Living Expenses:    Food Insecurity:     Worried About Running Out of Food in the Last Year:     Ran Out of Food in the Last Year:    Transportation Needs:     Lack of Transportation (Medical):      Lack of Transportation (Non-Medical):    Physical Activity:     Days of Exercise per Week:     Minutes of Exercise per Session:    Stress:     Feeling of Stress :    Social Connections:     Frequency of Communication with Friends and Family:     Frequency of Social Gatherings with Friends and Family:     Attends Yarsani Services:     Active Member of Clubs or Organizations:     Attends Club or Organization Meetings:     Marital Status:        Review of Systems   Constitutional: Negative for chills, fever, malaise/fatigue and weight loss. HENT: Negative for congestion, hearing loss, sore throat and tinnitus. Eyes: Negative for blurred vision, pain and discharge. Respiratory: Negative for cough, shortness of breath and wheezing. Cardiovascular: Negative for chest pain, palpitations, orthopnea, claudication and leg swelling. Gastrointestinal: Negative for abdominal pain, constipation and heartburn. Genitourinary: Negative for dysuria, frequency and urgency. Musculoskeletal: Negative for falls (Left knee but it is feeling better now) and myalgias. Skin: Negative for itching and rash. Neurological: Negative for dizziness, tingling, tremors and headaches. Endo/Heme/Allergies: Negative for environmental allergies and polydipsia. Psychiatric/Behavioral: Negative for depression and substance abuse. The patient is not nervous/anxious. Visit Vitals  /83 (BP 1 Location: Left arm)   Pulse 68   Temp 98.7 °F (37.1 °C)   Resp 20   Ht 6' (1.829 m)   Wt 284 lb 9.6 oz (129.1 kg)   SpO2 99%   BMI 38.60 kg/m²     Physical Exam  Vitals reviewed. Constitutional:       General: He is not in acute distress. Appearance: Normal appearance. He is obese. He is not ill-appearing. HENT:      Head: Normocephalic and atraumatic. Right Ear: Tympanic membrane, ear canal and external ear normal.      Left Ear: Tympanic membrane, ear canal and external ear normal.      Nose: Nose normal. No congestion or rhinorrhea. Mouth/Throat:      Mouth: Mucous membranes are moist.      Pharynx: No oropharyngeal exudate or posterior oropharyngeal erythema. Eyes:      Extraocular Movements: Extraocular movements intact. Conjunctiva/sclera: Conjunctivae normal.      Pupils: Pupils are equal, round, and reactive to light. Comments: Pupil iris and anterior chamber normal.   Neck:      Trachea: No tracheal deviation. Cardiovascular:      Rate and Rhythm: Normal rate and regular rhythm. Pulses: Normal pulses. Heart sounds: Normal heart sounds. No murmur heard. No friction rub. No gallop. Pulmonary:      Effort: Pulmonary effort is normal. No respiratory distress. Breath sounds: Normal breath sounds. No wheezing, rhonchi or rales. Chest:      Chest wall: No tenderness. Abdominal:      General: Bowel sounds are normal. There is no distension. Palpations: Abdomen is soft. There is no mass. Tenderness: There is no abdominal tenderness. There is no guarding or rebound. Hernia: No hernia is present. Musculoskeletal:         General: No tenderness. Normal range of motion. Cervical back: Normal range of motion and neck supple. Right lower leg: No edema. Left lower leg: No edema. Lymphadenopathy:      Cervical: No cervical adenopathy. Skin:     General: Skin is warm and dry. Findings: No erythema or rash. Neurological:      General: No focal deficit present. Mental Status: He is alert and oriented to person, place, and time. Cranial Nerves: No cranial nerve deficit. Motor: No abnormal muscle tone. Deep Tendon Reflexes: Reflexes are normal and symmetric. Reflexes normal.      Comments: Cranial nerves II through XII intact sensory and motor. Deep tendon reflexes in the biceps triceps knee and ankle are normal and bilaterally symmetrical.   Psychiatric:         Mood and Affect: Mood normal.         Behavior: Behavior normal.         Thought Content: Thought content normal.         Judgment: Judgment normal.           ICD-10-CM ICD-9-CM    1. Bilateral carotid artery stenosis  I65.23 433.10 LIPID PANEL     301.87 METABOLIC PANEL, COMPREHENSIVE      CBC WITH AUTOMATED DIFF      COLLECTION VENOUS BLOOD,VENIPUNCTURE      CBC WITH AUTOMATED DIFF      METABOLIC PANEL, COMPREHENSIVE      LIPID PANEL   2.  Gastroesophageal reflux disease with esophagitis without hemorrhage  K21.00 530.81      530.10    3. Eosinophilic esophagitis  W80.4 530.13 CBC WITH AUTOMATED DIFF      CBC WITH AUTOMATED DIFF   4. Cervical radiculopathy at C7  M54.12 723.4    5. BPH with obstruction/lower urinary tract symptoms  T79.1 444.72 METABOLIC PANEL, COMPREHENSIVE    E12.2 111.15 METABOLIC PANEL, COMPREHENSIVE   6. Mild intermittent asthma without complication  R57.79 866.94    7. Severe obesity ([address]  E66.01 278.01    8. Encounter for hepatitis C screening test for low risk patient  Z11.59 V73.89 HEPATITIS C AB      HEPATITIS C AB   9. Hymenoptera allergy  Z91.038 V15.06 EPINEPHrine (EpiPen) 0.3 mg/0.3 mL injection   10. Allergic conjunctivitis of both eyes  H10.13 372.14 ketotifen (ZADITOR) 0.025 % (0.035 %) ophthalmic solution   11. Chronic allergic rhinitis  J30.9 477.9 guaiFENesin (Mucinex) 1,200 mg Ta12 ER tablet      fluticasone propionate (FLONASE) 50 mcg/actuation nasal spray      montelukast (SINGULAIR) 10 mg tablet   12. Wheezing  R06.2 786.07 albuterol (PROVENTIL HFA, VENTOLIN HFA, PROAIR HFA) 90 mcg/actuation inhaler       Orders Placed This Encounter    HEPATITIS C AB     Standing Status:   Future     Number of Occurrences:   1     Standing Expiration Date:   8/17/2022    LIPID PANEL     Standing Status:   Future     Number of Occurrences:   1     Standing Expiration Date:   4/13/9376    METABOLIC PANEL, COMPREHENSIVE     Standing Status:   Future     Number of Occurrences:   1     Standing Expiration Date:   8/17/2022    CBC WITH AUTOMATED DIFF     Standing Status:   Future     Number of Occurrences:   1     Standing Expiration Date:   8/17/2022    COLLECTION VENOUS BLOOD,VENIPUNCTURE    famotidine (PEPCID) 40 mg tablet     Sig: Take 40 mg by mouth nightly.  pantoprazole (PROTONIX) 40 mg tablet     Sig: Take 40 mg by mouth two (2) times a day.     ketotifen (ZADITOR) 0.025 % (0.035 %) ophthalmic solution     Sig: Administer 1 Drop to both eyes two (2) times daily as needed (itchy watery eyes). Dispense:  10 mL     Refill:  3    guaiFENesin (Mucinex) 1,200 mg Ta12 ER tablet     Sig: Take 1 Tablet by mouth two (2) times a day. Dispense:  20 Tablet     Refill:  1    fluticasone propionate (FLONASE) 50 mcg/actuation nasal spray     Si spray each nostril twice daily     Dispense:  3 Bottle     Refill:  1    montelukast (SINGULAIR) 10 mg tablet     Sig: Take 1 Tablet by mouth daily. Dispense:  90 Tablet     Refill:  1    albuterol (PROVENTIL HFA, VENTOLIN HFA, PROAIR HFA) 90 mcg/actuation inhaler     Sig: Take 2 Puffs by inhalation every four (4) hours as needed for Wheezing. Dispense:  1 Inhaler     Refill:  3    EPINEPHrine (EpiPen) 0.3 mg/0.3 mL injection     Si.3 mL by IntraMUSCular route once as needed for Allergic Response for up to 1 dose. Dispense:  1 Syringe     Refill:  0       Follow-up and Dispositions    · Return in about 6 months (around 2022), or if symptoms worsen or fail to improve.          Ashley Das MD

## 2021-08-17 NOTE — PROGRESS NOTES
1. Have you been to the ER, urgent care clinic since your last visit? Hospitalized since your last visit?no    2. Have you seen or consulted any other health care providers outside of the [address] [place] [address] since your last visit? Include any pap smears or colon screening.  no

## 2021-08-18 LAB
ALBUMIN SERPL-MCNC: 4.2 G/DL (ref 3.5–5)
ALBUMIN/GLOB SERPL: 1.3 {RATIO} (ref 1.1–2.2)
ALP SERPL-CCNC: 96 U/L (ref 45–117)
ALT SERPL-CCNC: 48 U/L (ref 12–78)
ANION GAP SERPL CALC-SCNC: 5 MMOL/L (ref 5–15)
AST SERPL-CCNC: 21 U/L (ref 15–37)
BASOPHILS # BLD: 0.1 K/UL (ref 0–0.1)
BASOPHILS NFR BLD: 1 % (ref 0–1)
BILIRUB SERPL-MCNC: 0.3 MG/DL (ref 0.2–1)
BUN SERPL-MCNC: 15 MG/DL (ref 6–20)
BUN/CREAT SERPL: 17 (ref 12–20)
CALCIUM SERPL-MCNC: 9.7 MG/DL (ref 8.5–10.1)
CHLORIDE SERPL-SCNC: 106 MMOL/L (ref 97–108)
CHOLEST SERPL-MCNC: 287 MG/DL
CO2 SERPL-SCNC: 29 MMOL/L (ref 21–32)
CREAT SERPL-MCNC: 0.88 MG/DL (ref 0.7–1.3)
DIFFERENTIAL METHOD BLD: ABNORMAL
EOSINOPHIL # BLD: 0.8 K/UL (ref 0–0.4)
EOSINOPHIL NFR BLD: 8 % (ref 0–7)
ERYTHROCYTE [DISTWIDTH] IN BLOOD BY AUTOMATED COUNT: 13 % (ref 11.5–14.5)
GLOBULIN SER CALC-MCNC: 3.2 G/DL (ref 2–4)
GLUCOSE SERPL-MCNC: 97 MG/DL (ref 65–100)
HCT VFR BLD AUTO: 48.5 % (ref 36.6–50.3)
HCV AB SERPL QL IA: NONREACTIVE
HDLC SERPL-MCNC: 41 MG/DL
HDLC SERPL: 7 {RATIO} (ref 0–5)
HGB BLD-MCNC: 16.1 G/DL (ref 12.1–17)
IMM GRANULOCYTES # BLD AUTO: 0 K/UL (ref 0–0.04)
IMM GRANULOCYTES NFR BLD AUTO: 0 % (ref 0–0.5)
LDLC SERPL CALC-MCNC: ABNORMAL MG/DL (ref 0–100)
LDLC SERPL DIRECT ASSAY-MCNC: 181 MG/DL (ref 0–100)
LYMPHOCYTES # BLD: 3 K/UL (ref 0.8–3.5)
LYMPHOCYTES NFR BLD: 30 % (ref 12–49)
MCH RBC QN AUTO: 29.4 PG (ref 26–34)
MCHC RBC AUTO-ENTMCNC: 33.2 G/DL (ref 30–36.5)
MCV RBC AUTO: 88.5 FL (ref 80–99)
MONOCYTES # BLD: 0.6 K/UL (ref 0–1)
MONOCYTES NFR BLD: 6 % (ref 5–13)
NEUTS SEG # BLD: 5.6 K/UL (ref 1.8–8)
NEUTS SEG NFR BLD: 55 % (ref 32–75)
NRBC # BLD: 0 K/UL (ref 0–0.01)
NRBC BLD-RTO: 0 PER 100 WBC
PLATELET # BLD AUTO: 291 K/UL (ref 150–400)
PMV BLD AUTO: 9.8 FL (ref 8.9–12.9)
POTASSIUM SERPL-SCNC: 4.4 MMOL/L (ref 3.5–5.1)
PROT SERPL-MCNC: 7.4 G/DL (ref 6.4–8.2)
RBC # BLD AUTO: 5.48 M/UL (ref 4.1–5.7)
SODIUM SERPL-SCNC: 140 MMOL/L (ref 136–145)
TRIGL SERPL-MCNC: 450 MG/DL (ref ?–150)
VLDLC SERPL CALC-MCNC: ABNORMAL MG/DL
WBC # BLD AUTO: 10.1 K/UL (ref 4.1–11.1)

## 2021-08-19 RX ORDER — ATORVASTATIN CALCIUM 40 MG/1
40 TABLET, FILM COATED ORAL DAILY
Qty: 90 TABLET | Refills: 1 | Status: SHIPPED | OUTPATIENT
Start: 2021-08-19 | End: 2022-02-18 | Stop reason: SDUPTHER

## 2021-08-19 NOTE — PROGRESS NOTES
Patient identified by two patient identifiers, name and date of birth. Spoke with patient's wife Nadeem Lee. She is aware of results and states understanding at this time. She will call back with answer on patient starting statin or not and where to send it to.

## 2021-08-19 NOTE — PROGRESS NOTES
Wife called back and patient would like to try the cholesterol med. Please send cholesterol medication to [facility] in [facility].

## 2021-08-30 ENCOUNTER — OFFICE VISIT (OUTPATIENT)
Dept: CARDIOLOGY CLINIC | Age: 51
End: 2021-08-30

## 2021-08-30 DIAGNOSIS — Z45.09 ENCOUNTER FOR LOOP RECORDER CHECK: ICD-10-CM

## 2021-08-30 DIAGNOSIS — R55 SYNCOPE, UNSPECIFIED SYNCOPE TYPE: Primary | ICD-10-CM

## 2021-09-09 ENCOUNTER — TELEPHONE (OUTPATIENT)
Dept: CARDIOLOGY CLINIC | Age: 51
End: 2021-09-09

## 2021-09-09 ENCOUNTER — OFFICE VISIT (OUTPATIENT)
Dept: CARDIOLOGY CLINIC | Age: 51
End: 2021-09-09
Payer: COMMERCIAL

## 2021-09-09 VITALS
OXYGEN SATURATION: 98 % | DIASTOLIC BLOOD PRESSURE: 88 MMHG | WEIGHT: 285.6 LBS | BODY MASS INDEX: 38.68 KG/M2 | SYSTOLIC BLOOD PRESSURE: 130 MMHG | HEIGHT: 72 IN | HEART RATE: 67 BPM | RESPIRATION RATE: 16 BRPM

## 2021-09-09 DIAGNOSIS — I49.5 SSS (SICK SINUS SYNDROME) (HCC): ICD-10-CM

## 2021-09-09 DIAGNOSIS — R00.1 BRADYCARDIA: Primary | ICD-10-CM

## 2021-09-09 DIAGNOSIS — R55 SYNCOPE, UNSPECIFIED SYNCOPE TYPE: ICD-10-CM

## 2021-09-09 DIAGNOSIS — I49.3 PVC (PREMATURE VENTRICULAR CONTRACTION): ICD-10-CM

## 2021-09-09 DIAGNOSIS — I49.1 PREMATURE ATRIAL CONTRACTIONS: ICD-10-CM

## 2021-09-09 DIAGNOSIS — I48.91 ATRIAL FIBRILLATION, UNSPECIFIED TYPE (HCC): ICD-10-CM

## 2021-09-09 PROCEDURE — 93000 ELECTROCARDIOGRAM COMPLETE: CPT | Performed by: INTERNAL MEDICINE

## 2021-09-09 PROCEDURE — 99215 OFFICE O/P EST HI 40 MIN: CPT | Performed by: INTERNAL MEDICINE

## 2021-09-09 RX ORDER — EPINEPHRINE 0.3 MG/.3ML
INJECTION SUBCUTANEOUS
COMMUNITY
Start: 2021-08-17 | End: 2022-08-15 | Stop reason: SDUPTHER

## 2021-09-09 NOTE — PROGRESS NOTES
1. Have you been to the ER, urgent care clinic since your last visit? Hospitalized since your last visit? No.    2. Have you seen or consulted any other health care providers outside of the [address] [place] [address] since your last visit? Include any pap smears or colon screening.    No.        Chief Complaint   Patient presents with    Irregular Heart Beat     discuss 3 second pause on linq monitor - very fatigue- pt denies any cardiac symptoms

## 2021-09-09 NOTE — LETTER
9/9/2021    Patient: Miko Farah   YOB: 1970   Date of Visit: 9/9/2021     Matias Lake MD  [address] [place] [address] [address] [id number]  [address] [place]    Dear Matias Lake MD,      Thank you for referring Mr. Miko Farah to [address] [place] [address] for evaluation. My notes for this consultation are attached. If you have questions, please do not hesitate to call me. I look forward to following your patient along with you.       Sincerely,    Lainey Ho MD

## 2021-09-09 NOTE — H&P (VIEW-ONLY)
ELECTROPHYSIOLOGY        Subjective: Jose Aragon is a 52 y.o. male is here for EP follow up. The patient denies chest pain/ shortness of breath, orthopnea, PND, LE edema, palpitations, syncope, presyncope or fatigue. Hx of syncope.        Patient Active Problem List    Diagnosis Date Noted    Hymenoptera allergy 08/17/2021    Mild intermittent asthma without complication 88/59/9381    Left hip pain 02/12/2021    Bloating 10/73/6489    Eosinophilic esophagitis 27/13/9693    Bilateral carotid artery stenosis 11/01/2019    Gastroesophageal reflux disease 09/09/2019    Severe obesity ([address] 02/04/2019    Near syncope 01/14/2019    Class 2 obesity due to excess calories without serious comorbidity in adult 07/19/2018    BPH with obstruction/lower urinary tract symptoms 01/30/2018    Cervical radiculopathy at C7 01/30/2018      Rowena Isidro MD  Past Medical History:   Diagnosis Date    Acid reflux     Acute pain of right shoulder 1/30/2018    Asthma     Atrial fibrillation ([facility] [address] 2020    Carotid artery disease (HCC)     Fainting spell     Hyperlipidemia     Joint pain     Muscle ache     Muscle pain     Muscle weakness     Sinus problem     Urine troubles       Past Surgical History:   Procedure Laterality Date    HX AFIB ABLATION  09/18/2020    HX ENDOSCOPY  2020    HX ENDOSCOPY      HX ROTATOR CUFF REPAIR  04/03/2018    HX VASECTOMY  2017    INTRACARD ECHO, THER/DX INTERVENT N/A 9/18/2020    Intracardiac Echocardiogram performed by Rivera Samayoa MD at [facility] CARDIAC CATH LAB    [place] ABDOMEN SURGERY PROC UNLISTED      bilat ing hernia repair with repeat R    [place] ABDOMEN SURGERY PROC UNLISTED      umbilical hernia repair    [place] CARDIAC SURG PROCEDURE UNLIST  05/2020    loop recorder insertion    [place] EPHYS EVAL W/ABLATION SUPRAVENT ARRHYTHMIA N/A 9/18/2020    ABLATION SVT performed by Rivera Samayoa MD at [facility] CARDIAC CATH LAB    [place] EPHYS EVL TRNSPTL TX ATRIAL FIB ISOLAT PULM VEIN N/A 9/18/2020    ABLATION A-FIB  W COMPLETE EP STUDY performed by Jana Lopez MD at [facility] CARDIAC CATH LAB    [place] ICAR CATHETER ABLATION ARRHYTHMIA ADD ON N/A 9/18/2020    Ablation Svt/Vt Add On performed by Jana Lopez MD at [facility] CARDIAC CATH LAB    [place] INSERTION SUBQ CARDIAC RHYTHM MONITOR W/PRGRMG N/A 5/15/2020    LOOP RECORDER INSERT performed by Jana Lopez MD at [facility] CARDIAC CATH LAB    [place] INTRACARDIAC ELECTROPHYSIOLOGIC 3D MAPPING N/A 9/18/2020    Ep 3d Mapping performed by Jana Lopez MD at [facility] CARDIAC CATH LAB     Allergies   Allergen Reactions    Hornet Venom Swelling     Carries Epi pen    Adhesive Rash     Dermabond      Family History   Problem Relation Age of Onset    No Known Problems Mother     Heart Attack Father     No Known Problems Sister     No Known Problems Brother     No Known Problems Maternal Grandmother     No Known Problems Maternal Grandfather     Heart Failure Paternal Grandmother     Cancer Paternal Grandfather         esophagus    No Known Problems Other     negative for cardiac disease  Social History     Socioeconomic History    Marital status:      Spouse name: Ivy Griffin Number of children: Not on file    Years of education: Not on file    Highest education level: Not on file   Occupational History    Occupation:      Comment: [employer]   Tobacco Use    Smoking status: Never Smoker    Smokeless tobacco: Never Used   Vaping Use    Vaping Use: Never used   Substance and Sexual Activity    Alcohol use: No    Drug use: No    Sexual activity: Yes     Partners: Female   Other Topics Concern     Service No     Social Determinants of Health     Financial Resource Strain:     Difficulty of Paying Living Expenses:    Food Insecurity:     Worried About Running Out of Food in the Last Year:     [address] in the Last Year:    Transportation Needs:     Lack of Transportation (Medical):  Lack of Transportation (Non-Medical):    Physical Activity:     Days of Exercise per Week:     Minutes of Exercise per Session:    Stress:     Feeling of Stress :    Social Connections:     Frequency of Communication with Friends and Family:     Frequency of Social Gatherings with Friends and Family:     Attends Temple Services:     Active Member of Clubs or Organizations:     Attends Club or Organization Meetings:     Marital Status:      Current Outpatient Medications   Medication Sig    EPINEPHrine (EPIPEN) 0.3 mg/0.3 mL injection INJECT 0.3ML BY INTRAMUSCLAR ROUTE ONCE AS NEEDED FOR ALLERGIC RESPONSE FOR UP TO 1 DOSE    atorvastatin (LIPITOR) 40 mg tablet Take 1 Tablet by mouth daily.  pantoprazole (PROTONIX) 40 mg tablet Take 40 mg by mouth two (2) times a day.  ketotifen (ZADITOR) 0.025 % (0.035 %) ophthalmic solution Administer 1 Drop to both eyes two (2) times daily as needed (itchy watery eyes).  guaiFENesin (Mucinex) 1,200 mg Ta12 ER tablet Take 1 Tablet by mouth two (2) times a day. (Patient taking differently: Take 1,200 mg by mouth as needed.)    fluticasone propionate (FLONASE) 50 mcg/actuation nasal spray 1 spray each nostril twice daily (Patient taking differently: 2 Sprays by Both Nostrils route as needed.)    montelukast (SINGULAIR) 10 mg tablet Take 1 Tablet by mouth daily.  albuterol (PROVENTIL HFA, VENTOLIN HFA, PROAIR HFA) 90 mcg/actuation inhaler Take 2 Puffs by inhalation every four (4) hours as needed for Wheezing.  pseudoephedrine (SUDAFED) 60 mg tablet Take 1 Tab by mouth every six (6) hours as needed for Congestion.  famotidine (PEPCID) 40 mg tablet Take 40 mg by mouth nightly. (Patient not taking: Reported on 9/9/2021)    docosahexanoic acid/epa (FISH OIL PO) Take  by mouth daily. (Patient not taking: Reported on 9/9/2021)     No current facility-administered medications for this visit.       Vitals:    09/09/21 1101 BP: 130/88   Pulse: 67   Resp: 16   SpO2: 98%   Weight: 285 lb 9.6 oz (129.5 kg)   Height: 6' (1.829 m)       I have reviewed the nurses notes, vitals, problem list, allergy list, medical history, family, social history and medications. Review of Symptoms:  11 systems reviewed, negative other than as stated in the HPI      Physical Exam:      General: Well developed, in no acute distress. HEENT: Eyes - PERRL  Heart:  Normal S1/S2 negative S3 or S4. Regular, no murmur  Respiratory: Clear bilaterally x 4, no wheezing or rales  Extremities:  No edema, no cyanosis. Musculoskeletal: No clubbing  Neuro: A&Ox3, speech clear  Skin: No visible rashes or lesions. Non diaphoretic. No visible ulcers  Vascular: 2+ pulses symmetric in all extremities  Psych - judgement intact and orientation is wnl       Cardiographics    Ek21  Normal sinus rhythm. No results found for this or any previous visit. Lab Results   Component Value Date/Time    WBC 10.1 2021 03:04 PM    HGB 16.1 2021 03:04 PM    HCT 48.5 2021 03:04 PM    PLATELET 610  03:04 PM    MCV 88.5 2021 03:04 PM      Lab Results   Component Value Date/Time    Sodium 140 2021 03:04 PM    Potassium 4.4 2021 03:04 PM    Chloride 106 2021 03:04 PM    CO2 29 2021 03:04 PM    Anion gap 5 2021 03:04 PM    Glucose 97 2021 03:04 PM    BUN 15 2021 03:04 PM    Creatinine 0.88 2021 03:04 PM    BUN/Creatinine ratio 17 2021 03:04 PM    GFR est AA >60 2021 03:04 PM    GFR est non-AA >60 2021 03:04 PM    Calcium 9.7 2021 03:04 PM    Bilirubin, total 0.3 2021 03:04 PM    Alk.  phosphatase 96 2021 03:04 PM    Protein, total 7.4 2021 03:04 PM    Albumin 4.2 2021 03:04 PM    Globulin 3.2 2021 03:04 PM    A-G Ratio 1.3 2021 03:04 PM    ALT (SGPT) 48 2021 03:04 PM      Lab Results   Component Value Date/Time    TSH 2.300 12/04/2018 09:39 AM           Assessment:           ICD-10-CM ICD-9-CM    1. Bradycardia  R00.1 427.89 AMB POC EKG ROUTINE W/ 12 LEADS, INTER & REP      METABOLIC PANEL, COMPREHENSIVE      CBC W/O DIFF      PROTHROMBIN TIME + INR      XR CHEST PA LAT   2. Atrial fibrillation, unspecified type ([facility])  I48.91 427.31 AMB POC EKG ROUTINE W/ 12 LEADS, INTER & REP      METABOLIC PANEL, COMPREHENSIVE      CBC W/O DIFF      PROTHROMBIN TIME + INR      XR CHEST PA LAT   3. PVC (premature ventricular contraction)  I49.3 427.69 AMB POC EKG ROUTINE W/ 12 LEADS, INTER & REP      METABOLIC PANEL, COMPREHENSIVE      CBC W/O DIFF      PROTHROMBIN TIME + INR      XR CHEST PA LAT   4. Premature atrial contractions  I49.1 427.61 AMB POC EKG ROUTINE W/ 12 LEADS, INTER & REP      METABOLIC PANEL, COMPREHENSIVE      CBC W/O DIFF      PROTHROMBIN TIME + INR      XR CHEST PA LAT   5. Syncope, unspecified syncope type  S36 261.0 METABOLIC PANEL, COMPREHENSIVE      CBC W/O DIFF      PROTHROMBIN TIME + INR      XR CHEST PA LAT   6. SSS (sick sinus syndrome) ([facility])  I49.5 427.81      Orders Placed This Encounter    XR CHEST PA LAT     Standing Status:   Future     Standing Expiration Date:   08/9/8464    METABOLIC PANEL, COMPREHENSIVE     Standing Status:   Future     Standing Expiration Date:   11/5/2021    CBC W/O DIFF     Standing Status:   Future     Standing Expiration Date:   11/5/2021    PROTHROMBIN TIME + INR     Standing Status:   Future     Standing Expiration Date:   11/5/2021    AMB POC EKG ROUTINE W/ 12 LEADS, INTER & REP     Order Specific Question:   Reason for Exam:     Answer:   routine    EPINEPHrine (EPIPEN) 0.3 mg/0.3 mL injection     Sig: INJECT 0.3ML BY INTRAMUSCLAR ROUTE ONCE AS NEEDED FOR ALLERGIC RESPONSE FOR UP TO 1 DOSE        Plan:     Mr Jaguar Mccarthy is here for follow up. Noted to have 3 sec pause at 9:30 at night no on rate limiting medications with hx of syncope.  He is a candidate for left sided dual chamber ppm. I discussed the risks/benefits/alternatives of the procedure with the patient. Risks include (but are not limited to) bleeding, heart block, infection, cva/mi/tamponade/death. The patient understands and agrees to proceed. Addressed all patient questions and concerns at this visit. Thank you for allowing me to participate in Chris Hare 's care. Tyrone Duggan NP    Patient seen and examined by me with nurse practitioner. I personally performed all components of the history, physical, and medical decision making and agree with the assessment and plan with minor modifications as noted. Pt with hx of syncope and now noted to have greater than 3 sec pauses on ILR. Candidate for dc ppm. I discussed the risks/benefits/alternatives of the procedure with the patient. Risks include (but are not limited to) bleeding, heart block, infection, cva/mi/tamponade/death. The patient understands and agrees to proceed. Thank you for this interesting consultation. Carmela Barajas MD, [facility]      Patient was made aware during visit today that all testing completed would be instantaneously available on their MyChart for review. Discussed that these results will be made available to the provider at the same time. They were advised to wait at least 3 business days to allow for provider's interpretation of results with follow-up before calling our office with concerns about their results.

## 2021-09-09 NOTE — PROGRESS NOTES
ELECTROPHYSIOLOGY        Subjective: Kg Cline is a 52 y.o. male is here for EP follow up. The patient denies chest pain/ shortness of breath, orthopnea, PND, LE edema, palpitations, syncope, presyncope or fatigue. Hx of syncope.        Patient Active Problem List    Diagnosis Date Noted    Hymenoptera allergy 08/17/2021    Mild intermittent asthma without complication 30/70/3597    Left hip pain 02/12/2021    Bloating 69/94/4974    Eosinophilic esophagitis 51/32/3281    Bilateral carotid artery stenosis 11/01/2019    Gastroesophageal reflux disease 09/09/2019    Severe obesity ([address] 02/04/2019    Near syncope 01/14/2019    Class 2 obesity due to excess calories without serious comorbidity in adult 07/19/2018    BPH with obstruction/lower urinary tract symptoms 01/30/2018    Cervical radiculopathy at C7 01/30/2018      Jean Isidro MD  Past Medical History:   Diagnosis Date    Acid reflux     Acute pain of right shoulder 1/30/2018    Asthma     Atrial fibrillation ([facility] [address] 2020    Carotid artery disease (HCC)     Fainting spell     Hyperlipidemia     Joint pain     Muscle ache     Muscle pain     Muscle weakness     Sinus problem     Urine troubles       Past Surgical History:   Procedure Laterality Date    HX AFIB ABLATION  09/18/2020    HX ENDOSCOPY  2020    HX ENDOSCOPY      HX ROTATOR CUFF REPAIR  04/03/2018    HX VASECTOMY  2017    INTRACARD ECHO, THER/DX INTERVENT N/A 9/18/2020    Intracardiac Echocardiogram performed by Demian Robertson MD at [facility] CARDIAC CATH LAB    ND ABDOMEN SURGERY PROC UNLISTED      bilat ing hernia repair with repeat R    ND ABDOMEN SURGERY PROC UNLISTED      umbilical hernia repair    ND CARDIAC SURG PROCEDURE UNLIST  05/2020    loop recorder insertion    ND EPHYS EVAL W/ABLATION SUPRAVENT ARRHYTHMIA N/A 9/18/2020    ABLATION SVT performed by Demian Robertson MD at [facility] CARDIAC CATH LAB    ND EPHYS EVL TRNSPTL TX ATRIAL FIB ISOLAT PULM VEIN N/A 9/18/2020    ABLATION A-FIB  W COMPLETE EP STUDY performed by [facility]ty [facility] at [facility] CARDIAC CATH LAB    [place] ICAR CATHETER ABLATION ARRHYTHMIA ADD ON N/A 9/18/2020    Ablation Svt/Vt Add On performed by Katty Bryant MD at [facility] CARDIAC CATH LAB    [place] INSERTION SUBQ CARDIAC RHYTHM MONITOR W/PRGRMG N/A 5/15/2020    LOOP RECORDER INSERT performed by Katty Bryant MD at [facility] CARDIAC CATH LAB    [place] INTRACARDIAC ELECTROPHYSIOLOGIC 3D MAPPING N/A 9/18/2020    Ep 3d Mapping performed by Katty Bryant MD at [facility] CARDIAC CATH LAB     Allergies   Allergen Reactions    Hornet Venom Swelling     Carries Epi pen    Adhesive Rash     Dermabond      Family History   Problem Relation Age of Onset    No Known Problems Mother     Heart Attack Father     No Known Problems Sister     No Known Problems Brother     No Known Problems Maternal Grandmother     No Known Problems Maternal Grandfather     Heart Failure Paternal Grandmother     Cancer Paternal Grandfather         esophagus    No Known Problems Other     negative for cardiac disease  Social History     Socioeconomic History    Marital status:      Spouse name: Naseem Monaco Number of children: Not on file    Years of education: Not on file    Highest education level: Not on file   Occupational History    Occupation:      Comment: [employer]   Tobacco Use    Smoking status: Never Smoker    Smokeless tobacco: Never Used   Vaping Use    Vaping Use: Never used   Substance and Sexual Activity    Alcohol use: No    Drug use: No    Sexual activity: Yes     Partners: Female   Other Topics Concern     Service No     Social Determinants of Health     Financial Resource Strain:     Difficulty of Paying Living Expenses:    Food Insecurity:     Worried About Running Out of Food in the Last Year:     [address] in the Last Year:    Transportation Needs:     Lack of Transportation (Medical):  Lack of Transportation (Non-Medical):    Physical Activity:     Days of Exercise per Week:     Minutes of Exercise per Session:    Stress:     Feeling of Stress :    Social Connections:     Frequency of Communication with Friends and Family:     Frequency of Social Gatherings with Friends and Family:     Attends Adventism Services:     Active Member of Clubs or Organizations:     Attends Club or Organization Meetings:     Marital Status:      Current Outpatient Medications   Medication Sig    EPINEPHrine (EPIPEN) 0.3 mg/0.3 mL injection INJECT 0.3ML BY INTRAMUSCLAR ROUTE ONCE AS NEEDED FOR ALLERGIC RESPONSE FOR UP TO 1 DOSE    atorvastatin (LIPITOR) 40 mg tablet Take 1 Tablet by mouth daily.  pantoprazole (PROTONIX) 40 mg tablet Take 40 mg by mouth two (2) times a day.  ketotifen (ZADITOR) 0.025 % (0.035 %) ophthalmic solution Administer 1 Drop to both eyes two (2) times daily as needed (itchy watery eyes).  guaiFENesin (Mucinex) 1,200 mg Ta12 ER tablet Take 1 Tablet by mouth two (2) times a day. (Patient taking differently: Take 1,200 mg by mouth as needed.)    fluticasone propionate (FLONASE) 50 mcg/actuation nasal spray 1 spray each nostril twice daily (Patient taking differently: 2 Sprays by Both Nostrils route as needed.)    montelukast (SINGULAIR) 10 mg tablet Take 1 Tablet by mouth daily.  albuterol (PROVENTIL HFA, VENTOLIN HFA, PROAIR HFA) 90 mcg/actuation inhaler Take 2 Puffs by inhalation every four (4) hours as needed for Wheezing.  pseudoephedrine (SUDAFED) 60 mg tablet Take 1 Tab by mouth every six (6) hours as needed for Congestion.  famotidine (PEPCID) 40 mg tablet Take 40 mg by mouth nightly. (Patient not taking: Reported on 9/9/2021)    docosahexanoic acid/epa (FISH OIL PO) Take  by mouth daily. (Patient not taking: Reported on 9/9/2021)     No current facility-administered medications for this visit.       Vitals:    09/09/21 1101 BP: 130/88   Pulse: 67   Resp: 16   SpO2: 98%   Weight: 285 lb 9.6 oz (129.5 kg)   Height: 6' (1.829 m)       I have reviewed the nurses notes, vitals, problem list, allergy list, medical history, family, social history and medications. Review of Symptoms:  11 systems reviewed, negative other than as stated in the HPI      Physical Exam:      General: Well developed, in no acute distress. HEENT: Eyes - PERRL  Heart:  Normal S1/S2 negative S3 or S4. Regular, no murmur  Respiratory: Clear bilaterally x 4, no wheezing or rales  Extremities:  No edema, no cyanosis. Musculoskeletal: No clubbing  Neuro: A&Ox3, speech clear  Skin: No visible rashes or lesions. Non diaphoretic. No visible ulcers  Vascular: 2+ pulses symmetric in all extremities  Psych - judgement intact and orientation is wnl       Cardiographics    Ek21  Normal sinus rhythm. No results found for this or any previous visit. Lab Results   Component Value Date/Time    WBC 10.1 2021 03:04 PM    HGB 16.1 2021 03:04 PM    HCT 48.5 2021 03:04 PM    PLATELET 816  03:04 PM    MCV 88.5 2021 03:04 PM      Lab Results   Component Value Date/Time    Sodium 140 2021 03:04 PM    Potassium 4.4 2021 03:04 PM    Chloride 106 2021 03:04 PM    CO2 29 2021 03:04 PM    Anion gap 5 2021 03:04 PM    Glucose 97 2021 03:04 PM    BUN 15 2021 03:04 PM    Creatinine 0.88 2021 03:04 PM    BUN/Creatinine ratio 17 2021 03:04 PM    GFR est AA >60 2021 03:04 PM    GFR est non-AA >60 2021 03:04 PM    Calcium 9.7 2021 03:04 PM    Bilirubin, total 0.3 2021 03:04 PM    Alk.  phosphatase 96 2021 03:04 PM    Protein, total 7.4 2021 03:04 PM    Albumin 4.2 2021 03:04 PM    Globulin 3.2 2021 03:04 PM    A-G Ratio 1.3 2021 03:04 PM    ALT (SGPT) 48 2021 03:04 PM      Lab Results   Component Value Date/Time    TSH 2.300 12/04/2018 09:39 AM           Assessment:           ICD-10-CM ICD-9-CM    1. Bradycardia  R00.1 427.89 AMB POC EKG ROUTINE W/ 12 LEADS, INTER & REP      METABOLIC PANEL, COMPREHENSIVE      CBC W/O DIFF      PROTHROMBIN TIME + INR      XR CHEST PA LAT   2. Atrial fibrillation, unspecified type ([facility])  I48.91 427.31 AMB POC EKG ROUTINE W/ 12 LEADS, INTER & REP      METABOLIC PANEL, COMPREHENSIVE      CBC W/O DIFF      PROTHROMBIN TIME + INR      XR CHEST PA LAT   3. PVC (premature ventricular contraction)  I49.3 427.69 AMB POC EKG ROUTINE W/ 12 LEADS, INTER & REP      METABOLIC PANEL, COMPREHENSIVE      CBC W/O DIFF      PROTHROMBIN TIME + INR      XR CHEST PA LAT   4. Premature atrial contractions  I49.1 427.61 AMB POC EKG ROUTINE W/ 12 LEADS, INTER & REP      METABOLIC PANEL, COMPREHENSIVE      CBC W/O DIFF      PROTHROMBIN TIME + INR      XR CHEST PA LAT   5. Syncope, unspecified syncope type  O58 833.4 METABOLIC PANEL, COMPREHENSIVE      CBC W/O DIFF      PROTHROMBIN TIME + INR      XR CHEST PA LAT   6. SSS (sick sinus syndrome) ([facility])  I49.5 427.81      Orders Placed This Encounter    XR CHEST PA LAT     Standing Status:   Future     Standing Expiration Date:   32/3/2052    METABOLIC PANEL, COMPREHENSIVE     Standing Status:   Future     Standing Expiration Date:   11/5/2021    CBC W/O DIFF     Standing Status:   Future     Standing Expiration Date:   11/5/2021    PROTHROMBIN TIME + INR     Standing Status:   Future     Standing Expiration Date:   11/5/2021    AMB POC EKG ROUTINE W/ 12 LEADS, INTER & REP     Order Specific Question:   Reason for Exam:     Answer:   routine    EPINEPHrine (EPIPEN) 0.3 mg/0.3 mL injection     Sig: INJECT 0.3ML BY INTRAMUSCLAR ROUTE ONCE AS NEEDED FOR ALLERGIC RESPONSE FOR UP TO 1 DOSE        Plan:     Mr Gilda Jones is here for follow up. Noted to have 3 sec pause at 9:30 at night no on rate limiting medications with hx of syncope.  He is a candidate for left sided dual chamber ppm. I discussed the risks/benefits/alternatives of the procedure with the patient. Risks include (but are not limited to) bleeding, heart block, infection, cva/mi/tamponade/death. The patient understands and agrees to proceed. Addressed all patient questions and concerns at this visit. Thank you for allowing me to participate in Azael Garza 's care. Quinton Fleischer, NP    Patient seen and examined by me with nurse practitioner. I personally performed all components of the history, physical, and medical decision making and agree with the assessment and plan with minor modifications as noted. Pt with hx of syncope and now noted to have greater than 3 sec pauses on ILR. Candidate for dc ppm. I discussed the risks/benefits/alternatives of the procedure with the patient. Risks include (but are not limited to) bleeding, heart block, infection, cva/mi/tamponade/death. The patient understands and agrees to proceed. Thank you for this interesting consultation. Bay Linda MD, [facility]      Patient was made aware during visit today that all testing completed would be instantaneously available on their MyChart for review. Discussed that these results will be made available to the provider at the same time. They were advised to wait at least 3 business days to allow for provider's interpretation of results with follow-up before calling our office with concerns about their results.

## 2021-09-13 ENCOUNTER — HOSPITAL ENCOUNTER (OUTPATIENT)
Dept: LAB | Age: 51
Discharge: HOME | End: 2021-09-13
Payer: COMMERCIAL

## 2021-09-13 ENCOUNTER — HOSPITAL ENCOUNTER (OUTPATIENT)
Dept: GENERAL RADIOLOGY | Age: 51
Discharge: HOME | End: 2021-09-13
Payer: COMMERCIAL

## 2021-09-13 DIAGNOSIS — R00.1 BRADYCARDIA: ICD-10-CM

## 2021-09-13 DIAGNOSIS — I49.3 PVC (PREMATURE VENTRICULAR CONTRACTION): ICD-10-CM

## 2021-09-13 DIAGNOSIS — I49.1 PREMATURE ATRIAL CONTRACTIONS: ICD-10-CM

## 2021-09-13 DIAGNOSIS — I48.91 ATRIAL FIBRILLATION, UNSPECIFIED TYPE (HCC): ICD-10-CM

## 2021-09-13 DIAGNOSIS — R55 SYNCOPE, UNSPECIFIED SYNCOPE TYPE: ICD-10-CM

## 2021-09-13 LAB
ALBUMIN SERPL-MCNC: 4 G/DL (ref 3.5–5)
ALBUMIN/GLOB SERPL: 1.1 {RATIO} (ref 1.1–2.2)
ALP SERPL-CCNC: 98 U/L (ref 45–117)
ALT SERPL-CCNC: 44 U/L (ref 12–78)
ANION GAP SERPL CALC-SCNC: 7 MMOL/L (ref 5–15)
AST SERPL-CCNC: 19 U/L (ref 15–37)
BASOPHILS # BLD: 0.1 K/UL (ref 0–0.1)
BASOPHILS NFR BLD: 1 % (ref 0–1)
BILIRUB SERPL-MCNC: 0.4 MG/DL (ref 0.2–1)
BUN SERPL-MCNC: 17 MG/DL (ref 6–20)
BUN/CREAT SERPL: 15 (ref 12–20)
CALCIUM SERPL-MCNC: 8.8 MG/DL (ref 8.5–10.1)
CHLORIDE SERPL-SCNC: 103 MMOL/L (ref 97–108)
CO2 SERPL-SCNC: 31 MMOL/L (ref 21–32)
CREAT SERPL-MCNC: 1.11 MG/DL (ref 0.7–1.3)
DIFFERENTIAL METHOD BLD: ABNORMAL
EOSINOPHIL # BLD: 0.6 K/UL (ref 0–0.4)
EOSINOPHIL NFR BLD: 6 % (ref 0–7)
ERYTHROCYTE [DISTWIDTH] IN BLOOD BY AUTOMATED COUNT: 13 % (ref 11.5–14.5)
GLOBULIN SER CALC-MCNC: 3.5 G/DL (ref 2–4)
GLUCOSE SERPL-MCNC: 108 MG/DL (ref 65–100)
HCT VFR BLD AUTO: 45.4 % (ref 36.6–50.3)
HGB BLD-MCNC: 15.3 G/DL (ref 12.1–17)
IMM GRANULOCYTES # BLD AUTO: 0.1 K/UL (ref 0–0.04)
IMM GRANULOCYTES NFR BLD AUTO: 1 % (ref 0–0.5)
INR PPP: 1 (ref 0.9–1.1)
LYMPHOCYTES # BLD: 3.7 K/UL (ref 0.8–3.5)
LYMPHOCYTES NFR BLD: 35 % (ref 12–49)
MCH RBC QN AUTO: 29 PG (ref 26–34)
MCHC RBC AUTO-ENTMCNC: 33.7 G/DL (ref 30–36.5)
MCV RBC AUTO: 86 FL (ref 80–99)
MONOCYTES # BLD: 0.7 K/UL (ref 0–1)
MONOCYTES NFR BLD: 6 % (ref 5–13)
NEUTS SEG # BLD: 5.4 K/UL (ref 1.8–8)
NEUTS SEG NFR BLD: 51 % (ref 32–75)
NRBC # BLD: 0 K/UL (ref 0–0.01)
NRBC BLD-RTO: 0 PER 100 WBC
PLATELET # BLD AUTO: 266 K/UL (ref 150–400)
PMV BLD AUTO: 8.8 FL (ref 8.9–12.9)
POTASSIUM SERPL-SCNC: 3.9 MMOL/L (ref 3.5–5.1)
PROT SERPL-MCNC: 7.5 G/DL (ref 6.4–8.2)
PROTHROMBIN TIME: 9.8 SEC (ref 9–11.1)
RBC # BLD AUTO: 5.28 M/UL (ref 4.1–5.7)
SODIUM SERPL-SCNC: 141 MMOL/L (ref 136–145)
WBC # BLD AUTO: 10.5 K/UL (ref 4.1–11.1)

## 2021-09-13 PROCEDURE — 80053 COMPREHEN METABOLIC PANEL: CPT

## 2021-09-13 PROCEDURE — 85025 COMPLETE CBC W/AUTO DIFF WBC: CPT

## 2021-09-13 PROCEDURE — 85610 PROTHROMBIN TIME: CPT

## 2021-09-13 PROCEDURE — 36415 COLL VENOUS BLD VENIPUNCTURE: CPT

## 2021-09-13 PROCEDURE — 71046 X-RAY EXAM CHEST 2 VIEWS: CPT

## 2021-09-16 ENCOUNTER — HOSPITAL ENCOUNTER (OUTPATIENT)
Dept: PREADMISSION TESTING | Age: 51
Discharge: HOME | End: 2021-09-16
Payer: COMMERCIAL

## 2021-09-16 ENCOUNTER — HOSPITAL ENCOUNTER (OUTPATIENT)
Dept: PREADMISSION TESTING | Age: 51
Discharge: HOME | End: 2021-09-16

## 2021-09-16 PROCEDURE — U0005 INFEC AGEN DETEC AMPLI PROBE: HCPCS

## 2021-09-17 LAB
SARS-COV-2, XPLCVT: NOT DETECTED
SOURCE, COVRS: NORMAL

## 2021-09-20 ENCOUNTER — HOSPITAL ENCOUNTER (OUTPATIENT)
Age: 51
Discharge: HOME | End: 2021-09-20
Attending: INTERNAL MEDICINE | Admitting: INTERNAL MEDICINE
Payer: COMMERCIAL

## 2021-09-20 ENCOUNTER — APPOINTMENT (OUTPATIENT)
Dept: GENERAL RADIOLOGY | Age: 51
End: 2021-09-20
Attending: INTERNAL MEDICINE
Payer: COMMERCIAL

## 2021-09-20 VITALS
DIASTOLIC BLOOD PRESSURE: 81 MMHG | HEIGHT: 72 IN | SYSTOLIC BLOOD PRESSURE: 120 MMHG | HEART RATE: 67 BPM | RESPIRATION RATE: 18 BRPM | OXYGEN SATURATION: 96 % | BODY MASS INDEX: 37.93 KG/M2 | TEMPERATURE: 98.2 F | WEIGHT: 280 LBS

## 2021-09-20 DIAGNOSIS — I49.5 SSS (SICK SINUS SYNDROME) (HCC): ICD-10-CM

## 2021-09-20 DIAGNOSIS — R55 SYNCOPE AND COLLAPSE: ICD-10-CM

## 2021-09-20 PROCEDURE — 77030022704 HC SUT VLOC COVD -B: Performed by: INTERNAL MEDICINE

## 2021-09-20 PROCEDURE — 71045 X-RAY EXAM CHEST 1 VIEW: CPT

## 2021-09-20 PROCEDURE — 77030010507 HC ADH SKN DERMBND J&J -B: Performed by: INTERNAL MEDICINE

## 2021-09-20 PROCEDURE — C1893 INTRO/SHEATH, FIXED,NON-PEEL: HCPCS | Performed by: INTERNAL MEDICINE

## 2021-09-20 PROCEDURE — 74011000250 HC RX REV CODE- 250: Performed by: INTERNAL MEDICINE

## 2021-09-20 PROCEDURE — 77030002996 HC SUT SLK J&J -A: Performed by: INTERNAL MEDICINE

## 2021-09-20 PROCEDURE — C1892 INTRO/SHEATH,FIXED,PEEL-AWAY: HCPCS | Performed by: INTERNAL MEDICINE

## 2021-09-20 PROCEDURE — C1894 INTRO/SHEATH, NON-LASER: HCPCS | Performed by: INTERNAL MEDICINE

## 2021-09-20 PROCEDURE — 2709999900 HC NON-CHARGEABLE SUPPLY: Performed by: INTERNAL MEDICINE

## 2021-09-20 PROCEDURE — 74011000272 HC RX REV CODE- 272: Performed by: INTERNAL MEDICINE

## 2021-09-20 PROCEDURE — C1898 LEAD, PMKR, OTHER THAN TRANS: HCPCS | Performed by: INTERNAL MEDICINE

## 2021-09-20 PROCEDURE — 74011250637 HC RX REV CODE- 250/637: Performed by: NURSE PRACTITIONER

## 2021-09-20 PROCEDURE — 99152 MOD SED SAME PHYS/QHP 5/>YRS: CPT | Performed by: INTERNAL MEDICINE

## 2021-09-20 PROCEDURE — 99153 MOD SED SAME PHYS/QHP EA: CPT | Performed by: INTERNAL MEDICINE

## 2021-09-20 PROCEDURE — C1785 PMKR, DUAL, RATE-RESP: HCPCS | Performed by: INTERNAL MEDICINE

## 2021-09-20 PROCEDURE — 33208 INSRT HEART PM ATRIAL & VENT: CPT | Performed by: INTERNAL MEDICINE

## 2021-09-20 PROCEDURE — 74011250636 HC RX REV CODE- 250/636: Performed by: INTERNAL MEDICINE

## 2021-09-20 PROCEDURE — 77030018729 HC ELECTRD DEFIB PAD CARD -B: Performed by: INTERNAL MEDICINE

## 2021-09-20 DEVICE — LEAD PCMKR CAPSUR FIX NOVUS 52 --: Type: IMPLANTABLE DEVICE | Status: FUNCTIONAL

## 2021-09-20 DEVICE — LEAD PCMKR CAPSUR FIX NOVUS 58 --: Type: IMPLANTABLE DEVICE | Status: FUNCTIONAL

## 2021-09-20 DEVICE — PCMKR AZURE XT DR MRI --: Type: IMPLANTABLE DEVICE | Status: FUNCTIONAL

## 2021-09-20 RX ORDER — FENTANYL CITRATE 50 UG/ML
INJECTION, SOLUTION INTRAMUSCULAR; INTRAVENOUS AS NEEDED
Status: DISCONTINUED | OUTPATIENT
Start: 2021-09-20 | End: 2021-09-20 | Stop reason: HOSPADM

## 2021-09-20 RX ORDER — MIDAZOLAM HYDROCHLORIDE 1 MG/ML
INJECTION, SOLUTION INTRAMUSCULAR; INTRAVENOUS AS NEEDED
Status: DISCONTINUED | OUTPATIENT
Start: 2021-09-20 | End: 2021-09-20 | Stop reason: HOSPADM

## 2021-09-20 RX ORDER — HEPARIN SODIUM 200 [USP'U]/100ML
INJECTION, SOLUTION INTRAVENOUS
Status: COMPLETED | OUTPATIENT
Start: 2021-09-20 | End: 2021-09-20

## 2021-09-20 RX ORDER — SODIUM CHLORIDE 0.9 % (FLUSH) 0.9 %
5-40 SYRINGE (ML) INJECTION AS NEEDED
Status: DISCONTINUED | OUTPATIENT
Start: 2021-09-20 | End: 2021-09-20 | Stop reason: HOSPADM

## 2021-09-20 RX ORDER — SODIUM CHLORIDE 0.9 % (FLUSH) 0.9 %
5-40 SYRINGE (ML) INJECTION EVERY 8 HOURS
Status: DISCONTINUED | OUTPATIENT
Start: 2021-09-20 | End: 2021-09-20 | Stop reason: HOSPADM

## 2021-09-20 RX ORDER — LIDOCAINE HYDROCHLORIDE 10 MG/ML
INJECTION, SOLUTION EPIDURAL; INFILTRATION; INTRACAUDAL; PERINEURAL AS NEEDED
Status: DISCONTINUED | OUTPATIENT
Start: 2021-09-20 | End: 2021-09-20 | Stop reason: HOSPADM

## 2021-09-20 RX ORDER — NALOXONE HYDROCHLORIDE 0.4 MG/ML
0.4 INJECTION, SOLUTION INTRAMUSCULAR; INTRAVENOUS; SUBCUTANEOUS AS NEEDED
Status: DISCONTINUED | OUTPATIENT
Start: 2021-09-20 | End: 2021-09-20 | Stop reason: HOSPADM

## 2021-09-20 RX ORDER — ACETAMINOPHEN 325 MG/1
650 TABLET ORAL
Status: DISCONTINUED | OUTPATIENT
Start: 2021-09-20 | End: 2021-09-20 | Stop reason: HOSPADM

## 2021-09-20 RX ORDER — CEFAZOLIN SODIUM/WATER 2 G/20 ML
SYRINGE (ML) INTRAVENOUS
Status: COMPLETED | OUTPATIENT
Start: 2021-09-20 | End: 2021-09-20

## 2021-09-20 RX ADMIN — ACETAMINOPHEN 650 MG: 325 TABLET ORAL at 15:07

## 2021-09-20 NOTE — Clinical Note
Left chest prepped with ChloraPrep Wet prep solution applied at: 1142. Wet prep solution dried at: 1147. Wet prep elapsed drying time: 5 mins.

## 2021-09-20 NOTE — PROGRESS NOTES
Cardiac Cath Lab Recovery Arrival Note:      Beckey Hodgkin arrived to Cardiac Cath Lab, Recovery Area. Staff introduced to patient. Patient identifiers verified with NAME and DATE OF BIRTH. Procedure verified with patient. Consent forms reviewed and signed by patient or authorized representative and verified. Allergies verified. Patient and family oriented to department. Patient and family informed of procedure and plan of care. Questions answered with review. Patient prepped for procedure, per orders from physician, prior to arrival.    Patient on cardiac monitor, non-invasive blood pressure, SPO2 monitor. On RA. Patient is A&Ox 4. Patient reports no complaints. Patient in stretcher, in low position, with side rails up, call bell within reach, patient instructed to call if assistance as needed. Patient prep in: [address], [place]. Patient family has pager # none  Family in: wife- ccl waiting area.    Prep by: Cami López RN and Rosi Leung RN and Kathia FONTENOT

## 2021-09-20 NOTE — INTERVAL H&P NOTE
Update History & Physical    The Patient's History and Physical of September 9, 2021 was reviewed with the patient and I examined the patient. There was no change. The surgical site was confirmed by the patient and me. Plan:  The risk, benefits, expected outcome, and alternative to the recommended procedure have been discussed with the patient. Patient understands and wants to proceed with the procedure.     Electronically signed by Alex La MD on 9/20/2021 at 11:42 AM

## 2021-09-20 NOTE — Clinical Note
TRANSFER - OUT REPORT:     Verbal report given to: Recovery RN's, (at bedside). Report consisted of patient's Situation, Background, Assessment and   Recommendations(SBAR). Opportunity for questions and clarification was provided. Patient transported to: recovery.

## 2021-09-20 NOTE — PROGRESS NOTES
Primary Nurse Marcello Shine and Dg Bland RN performed a dual skin assessment on this patient No impairment noted  Vinny score is 23

## 2021-09-20 NOTE — DISCHARGE INSTRUCTIONS
[address] [place] [address]  [phone]        NEW PACEMAKER IMPLANT DISCHARGE INSTRUCTIONS    Patient ID:  Rhenda Sicard  [id number]  12 y.o.  1970    Admit Date: 9/20/2021    Discharge Date: 9/20/2021     Admitting Physician: Narayan Kee MD     Discharge Physician: Narayan Kee MD    Admission Diagnoses:   SSS (sick sinus syndrome) [facility]) [I49.5]    Discharge Diagnoses: Active Problems:    * No active hospital problems. *      Discharge Condition: Good    Cardiology Procedures this Admission:  Pacemaker insertion. Disposition: home    Reference discharge instructions provided by nursing for diet and activity. Follow-up with device clinic in three weeks. Call [phone] to make an appointment. Signed:  YVON Bauman  9/20/2021  12:09 PM      DISCHARGE INSTRUCTIONS FOR PATIENTS WITH PACEMAKERS    1. Remember to call for an appointment for 3 weeks [phone] to check healing and implant programming. 2. Medic Alert Bracelets are available from your pharmacist to wear at all times if you choose to wear one. 3. Carry your ID card for pacemaker with you at all times. This card will be given to you in the hospital or mailed to you. 4. The pacemaker will bulge slightly under your skin. The bulge will decrease in size over the next few weeks. Please notify the doctor's office if you notice any of the following around your site:   A.  A bruise that does not go away. B.  Soreness or yellow, green, or brown drainage from the site. C. Any swelling from the site. D. If you have a fever of 100 degrees or higher that lasts for a few days. INCISION CARE       1.  Leave the dressing over your site until your follow up visit. 2.  You may not shower until after follow up visit. 3.  For comfort, wear loose fitting clothing. 1. 4.  Ice pack to affected shoulder for first 24 hours, wear your sling for 2 days.   2. 5.  Report any signs of infection, fever, pain, swelling, redness, oozing, or heat at site especially if these symptoms increase after the first 3 to 4 days. ACTIVITY PRECAUTIONS     1. Avoid rough contact with the implant site. 2. No driving for 14 days. 3. Avoid lifting your arm over your head, carrying anything on the affected side, or lifting over 10 pounds for 90 days. For the first 2 days only bend your arm at the elbow. 4. Any extreme activity such as golf, weight lifting or exercise biking should be restricted for 60 days. 5. Do not carry objects by holding them against your implant site. 6.  No shooting rifles or any type of gun with the affected shoulder permanently. SPECIAL PRECAUTIONS     1. You should avoid all strong magnetic fields, such as arc welding, large transformers, large motors. 2.  You may or may not (depending on your device) have an MRI which uses a strong magnet to take pictures. 3.  Treatments or surgery that requires diathermy or electrocautery should be discussed with your doctor before scheduled. 4. Avoid radio frequency transmitters, including radar. 5. Advise dentist or other medical personnel you see that you have a pacemaker. 6.  Cell phones and microwave oven use is okay. 7.  If you plan to move or take a trip to a new area, the doctor's office will give you a name of a doctor to contact for any problems. ANTIBIOTIC THERAPY    During the first 8 weeks after your pacemaker insertion, you may need antibiotics before any dental work or certain tests or operations. Let the dentist or doctor who is caring for you know that you have had an implanted device.

## 2021-09-20 NOTE — PROGRESS NOTES
Ambulate with pt in wilhelm to BR. Gait steady,  Pt denies c/o. Left anterior chest wall dressing dry and intact. DC instructions reviewed with pt and wife. Both verbalize understanding. SL dc'd without difficulty. Pt wheeled to front door to be driven home by wife.

## 2021-10-03 ENCOUNTER — APPOINTMENT (OUTPATIENT)
Dept: GENERAL RADIOLOGY | Age: 51
DRG: 316 | End: 2021-10-03
Attending: STUDENT IN AN ORGANIZED HEALTH CARE EDUCATION/TRAINING PROGRAM
Payer: COMMERCIAL

## 2021-10-03 ENCOUNTER — HOSPITAL ENCOUNTER (INPATIENT)
Age: 51
LOS: 1 days | Discharge: HOME | DRG: 316 | End: 2021-10-04
Attending: EMERGENCY MEDICINE | Admitting: INTERNAL MEDICINE
Payer: COMMERCIAL

## 2021-10-03 DIAGNOSIS — I49.5 SSS (SICK SINUS SYNDROME) (HCC): Chronic | ICD-10-CM

## 2021-10-03 DIAGNOSIS — R55 SYNCOPE AND COLLAPSE: ICD-10-CM

## 2021-10-03 DIAGNOSIS — T82.7XXA PACEMAKER INFECTION, INITIAL ENCOUNTER (HCC): Primary | ICD-10-CM

## 2021-10-03 LAB
ALBUMIN SERPL-MCNC: 3.6 G/DL (ref 3.5–5)
ALBUMIN/GLOB SERPL: 0.9 {RATIO} (ref 1.1–2.2)
ALP SERPL-CCNC: 111 U/L (ref 45–117)
ALT SERPL-CCNC: 51 U/L (ref 12–78)
ANION GAP SERPL CALC-SCNC: 6 MMOL/L (ref 5–15)
AST SERPL-CCNC: 27 U/L (ref 15–37)
BASOPHILS # BLD: 0.1 K/UL (ref 0–0.1)
BASOPHILS NFR BLD: 1 % (ref 0–1)
BILIRUB SERPL-MCNC: 0.4 MG/DL (ref 0.2–1)
BUN SERPL-MCNC: 14 MG/DL (ref 6–20)
BUN/CREAT SERPL: 14 (ref 12–20)
CALCIUM SERPL-MCNC: 8.6 MG/DL (ref 8.5–10.1)
CHLORIDE SERPL-SCNC: 104 MMOL/L (ref 97–108)
CO2 SERPL-SCNC: 28 MMOL/L (ref 21–32)
CREAT SERPL-MCNC: 1 MG/DL (ref 0.7–1.3)
DIFFERENTIAL METHOD BLD: ABNORMAL
EOSINOPHIL # BLD: 0.7 K/UL (ref 0–0.4)
EOSINOPHIL NFR BLD: 7 % (ref 0–7)
ERYTHROCYTE [DISTWIDTH] IN BLOOD BY AUTOMATED COUNT: 12.8 % (ref 11.5–14.5)
GLOBULIN SER CALC-MCNC: 3.8 G/DL (ref 2–4)
GLUCOSE SERPL-MCNC: 115 MG/DL (ref 65–100)
HCT VFR BLD AUTO: 44.4 % (ref 36.6–50.3)
HGB BLD-MCNC: 15 G/DL (ref 12.1–17)
IMM GRANULOCYTES # BLD AUTO: 0.1 K/UL (ref 0–0.04)
IMM GRANULOCYTES NFR BLD AUTO: 1 % (ref 0–0.5)
LYMPHOCYTES # BLD: 2.9 K/UL (ref 0.8–3.5)
LYMPHOCYTES NFR BLD: 28 % (ref 12–49)
MCH RBC QN AUTO: 28.8 PG (ref 26–34)
MCHC RBC AUTO-ENTMCNC: 33.8 G/DL (ref 30–36.5)
MCV RBC AUTO: 85.4 FL (ref 80–99)
MONOCYTES # BLD: 0.8 K/UL (ref 0–1)
MONOCYTES NFR BLD: 7 % (ref 5–13)
NEUTS SEG # BLD: 5.9 K/UL (ref 1.8–8)
NEUTS SEG NFR BLD: 56 % (ref 32–75)
NRBC # BLD: 0 K/UL (ref 0–0.01)
NRBC BLD-RTO: 0 PER 100 WBC
PLATELET # BLD AUTO: 265 K/UL (ref 150–400)
PMV BLD AUTO: 8.8 FL (ref 8.9–12.9)
POTASSIUM SERPL-SCNC: 3.6 MMOL/L (ref 3.5–5.1)
PROT SERPL-MCNC: 7.4 G/DL (ref 6.4–8.2)
RBC # BLD AUTO: 5.2 M/UL (ref 4.1–5.7)
SODIUM SERPL-SCNC: 138 MMOL/L (ref 136–145)
TROPONIN I SERPL-MCNC: <0.05 NG/ML
WBC # BLD AUTO: 10.4 K/UL (ref 4.1–11.1)

## 2021-10-03 PROCEDURE — 87040 BLOOD CULTURE FOR BACTERIA: CPT

## 2021-10-03 PROCEDURE — 87186 SC STD MICRODIL/AGAR DIL: CPT

## 2021-10-03 PROCEDURE — 93005 ELECTROCARDIOGRAM TRACING: CPT

## 2021-10-03 PROCEDURE — 85025 COMPLETE CBC W/AUTO DIFF WBC: CPT

## 2021-10-03 PROCEDURE — 99284 EMERGENCY DEPT VISIT MOD MDM: CPT

## 2021-10-03 PROCEDURE — 74011250636 HC RX REV CODE- 250/636: Performed by: EMERGENCY MEDICINE

## 2021-10-03 PROCEDURE — 71045 X-RAY EXAM CHEST 1 VIEW: CPT

## 2021-10-03 PROCEDURE — 87205 SMEAR GRAM STAIN: CPT

## 2021-10-03 PROCEDURE — 87077 CULTURE AEROBIC IDENTIFY: CPT

## 2021-10-03 PROCEDURE — 36415 COLL VENOUS BLD VENIPUNCTURE: CPT

## 2021-10-03 PROCEDURE — 80053 COMPREHEN METABOLIC PANEL: CPT

## 2021-10-03 PROCEDURE — 65660000000 HC RM CCU STEPDOWN

## 2021-10-03 PROCEDURE — 74011250637 HC RX REV CODE- 250/637: Performed by: INTERNAL MEDICINE

## 2021-10-03 PROCEDURE — 84484 ASSAY OF TROPONIN QUANT: CPT

## 2021-10-03 PROCEDURE — 74011000258 HC RX REV CODE- 258: Performed by: EMERGENCY MEDICINE

## 2021-10-03 RX ORDER — FAMOTIDINE 20 MG/1
40 TABLET, FILM COATED ORAL
Status: DISCONTINUED | OUTPATIENT
Start: 2021-10-03 | End: 2021-10-04 | Stop reason: HOSPADM

## 2021-10-03 RX ORDER — MONTELUKAST SODIUM 10 MG/1
10 TABLET ORAL DAILY
Status: DISCONTINUED | OUTPATIENT
Start: 2021-10-04 | End: 2021-10-04 | Stop reason: HOSPADM

## 2021-10-03 RX ORDER — ATORVASTATIN CALCIUM 40 MG/1
40 TABLET, FILM COATED ORAL DAILY
Status: DISCONTINUED | OUTPATIENT
Start: 2021-10-04 | End: 2021-10-04 | Stop reason: HOSPADM

## 2021-10-03 RX ORDER — ACETAMINOPHEN 325 MG/1
650 TABLET ORAL
Status: DISCONTINUED | OUTPATIENT
Start: 2021-10-03 | End: 2021-10-04 | Stop reason: HOSPADM

## 2021-10-03 RX ORDER — ENOXAPARIN SODIUM 100 MG/ML
40 INJECTION SUBCUTANEOUS
Status: DISCONTINUED | OUTPATIENT
Start: 2021-10-04 | End: 2021-10-04 | Stop reason: HOSPADM

## 2021-10-03 RX ORDER — VANCOMYCIN/0.9 % SOD CHLORIDE 1.5G/250ML
1500 PLASTIC BAG, INJECTION (ML) INTRAVENOUS EVERY 12 HOURS
Status: DISCONTINUED | OUTPATIENT
Start: 2021-10-04 | End: 2021-10-04

## 2021-10-03 RX ORDER — FLUTICASONE PROPIONATE 50 MCG
2 SPRAY, SUSPENSION (ML) NASAL DAILY
Status: DISCONTINUED | OUTPATIENT
Start: 2021-10-04 | End: 2021-10-04 | Stop reason: HOSPADM

## 2021-10-03 RX ADMIN — ACETAMINOPHEN 650 MG: 325 TABLET ORAL at 22:53

## 2021-10-03 RX ADMIN — VANCOMYCIN HYDROCHLORIDE 2500 MG: 10 INJECTION, POWDER, LYOPHILIZED, FOR SOLUTION INTRAVENOUS at 22:22

## 2021-10-03 RX ADMIN — FAMOTIDINE 40 MG: 20 TABLET ORAL at 22:53

## 2021-10-03 RX ADMIN — PIPERACILLIN AND TAZOBACTAM 3.38 G: 3; .375 INJECTION, POWDER, LYOPHILIZED, FOR SOLUTION INTRAVENOUS at 21:34

## 2021-10-03 NOTE — Clinical Note
Status[de-identified] INPATIENT [101]   Type of Bed: Telemetry [19]   Cardiac Monitoring Required?: Yes   Inpatient Hospitalization Certified Necessary for the Following Reasons: 3.  Patient receiving treatment that can only be provided in an inpatient setting (further clarification in H&P documentation)   Admitting Diagnosis: Infected pacemaker [facility]) [[id number]]   Admitting Physician: Pauline Harmon   Attending Physician: Pauline Harmon   Estimated Length of Stay: 3-4 Midnights   Discharge Plan[de-identified] Other (Specify)

## 2021-10-04 VITALS
BODY MASS INDEX: 38.37 KG/M2 | RESPIRATION RATE: 18 BRPM | HEART RATE: 74 BPM | HEIGHT: 72 IN | DIASTOLIC BLOOD PRESSURE: 79 MMHG | OXYGEN SATURATION: 96 % | TEMPERATURE: 98.5 F | WEIGHT: 283.29 LBS | SYSTOLIC BLOOD PRESSURE: 129 MMHG

## 2021-10-04 LAB
ATRIAL RATE: 86 BPM
CALCULATED P AXIS, ECG09: 69 DEGREES
CALCULATED R AXIS, ECG10: 15 DEGREES
CALCULATED T AXIS, ECG11: 18 DEGREES
DIAGNOSIS, 93000: NORMAL
P-R INTERVAL, ECG05: 156 MS
Q-T INTERVAL, ECG07: 370 MS
QRS DURATION, ECG06: 86 MS
QTC CALCULATION (BEZET), ECG08: 442 MS
VENTRICULAR RATE, ECG03: 86 BPM

## 2021-10-04 PROCEDURE — 99024 POSTOP FOLLOW-UP VISIT: CPT | Performed by: INTERNAL MEDICINE

## 2021-10-04 PROCEDURE — 74011000258 HC RX REV CODE- 258: Performed by: INTERNAL MEDICINE

## 2021-10-04 PROCEDURE — APPSS45 APP SPLIT SHARED TIME 31-45 MINUTES: Performed by: NURSE PRACTITIONER

## 2021-10-04 PROCEDURE — 74011250637 HC RX REV CODE- 250/637: Performed by: INTERNAL MEDICINE

## 2021-10-04 PROCEDURE — 74011250636 HC RX REV CODE- 250/636: Performed by: INTERNAL MEDICINE

## 2021-10-04 RX ORDER — BACITRACIN 500 [USP'U]/G
OINTMENT TOPICAL 3 TIMES DAILY
Qty: 1 EACH | Refills: 0 | Status: SHIPPED | OUTPATIENT
Start: 2021-10-04 | End: 2021-10-09

## 2021-10-04 RX ORDER — CEPHALEXIN 500 MG/1
500 CAPSULE ORAL 4 TIMES DAILY
Qty: 20 CAPSULE | Refills: 0 | Status: SHIPPED | OUTPATIENT
Start: 2021-10-04 | End: 2021-10-09

## 2021-10-04 RX ORDER — SAME BUTANEDISULFONATE/BETAINE 400-600 MG
250 POWDER IN PACKET (EA) ORAL 2 TIMES DAILY
Qty: 14 CAPSULE | Refills: 0 | Status: SHIPPED | OUTPATIENT
Start: 2021-10-04 | End: 2021-10-11

## 2021-10-04 RX ORDER — CETIRIZINE HCL 10 MG
10 TABLET ORAL DAILY
Qty: 5 TABLET | Refills: 0 | Status: SHIPPED | OUTPATIENT
Start: 2021-10-04 | End: 2021-10-09

## 2021-10-04 RX ADMIN — ENOXAPARIN SODIUM 40 MG: 40 INJECTION SUBCUTANEOUS at 08:55

## 2021-10-04 RX ADMIN — MONTELUKAST 10 MG: 10 TABLET, FILM COATED ORAL at 08:52

## 2021-10-04 RX ADMIN — ATORVASTATIN CALCIUM 40 MG: 40 TABLET, FILM COATED ORAL at 08:54

## 2021-10-04 RX ADMIN — PIPERACILLIN AND TAZOBACTAM 3.38 G: 3; .375 INJECTION, POWDER, LYOPHILIZED, FOR SOLUTION INTRAVENOUS at 08:55

## 2021-10-04 NOTE — PROGRESS NOTES
Pharmacy Antimicrobial Kinetic Dosing    Indication for Antimicrobials: Pacemaker site infection    Current Regimen of Each Antimicrobial:  Zosyn 3.375 gm IV q 8h    (Start Date 10-3; Day # 1)  Vancomycin 2500 mg IV x1, Pharmacy to dose    (Start Date 10-3; Day # 1)    Goal Level: AUC: 400-600 mg/hr/Liter/day    Date Dose & Interval Measured (mcg/mL) Predicted AUC/BORA   10-3 start 1500 mg IV Q 12h    /  18                 Date & time of next level:    10-5  1000    Dosing calculator used: Nitro PDF calculator    Significant Positive Cultures:   10-3   Blood = pending    Conditions for Dosing Consideration: None    Labs:  Recent Labs     10/03/21  1823   CREA 1.00   BUN 14     Recent Labs     10/03/21  1823   WBC 10.4     Temp (24hrs), Av.1 °F (36.7 °C), Min:98.1 °F (36.7 °C), Max:98.1 °F (36.7 °C)        Creatinine Clearance (mL/min):   CrCl (Ideal Body Weight): 99.2   If actual weight < IBW: CrCl (Actual Body Weight) 164.2    Impression/Plan:   · Will begin maintenance dosing of Vancomycin with 1500 mg IV q 12h   · Daily BMP per Vancomycin dosing protocol ordered    · Antimicrobial stop date: To be determined     Pharmacy will follow daily and adjust medications as appropriate for renal function and/or serum levels.     Thank you,  Rosi Drew, [facility]

## 2021-10-04 NOTE — CONSULTS
[address] [place] [address]  [phone]        Date of  Admission: 10/3/2021  8:28 PM     Admission type:Emergency    Consult for: Infected pacemaker ([address] Quinnesec Daniella. 7XXA]  Consult by: Dr Kavita Prado, NP    PLEASE NOTE THAT WE CONFIRMED WITH THE REFERRING PHYSICIAN PRIOR TO SEEING THE PATIENT THAT THE PATIENT IS BEING Critical access hospital EVALUATION AND FOR LONG-TERM ONGOING CARDIAC CARE. Subjective: Tanner Jason is a 52 y.o. male admitted for Infected pacemaker ([address] Henri Brewer. 7XXA]. Patient complains of  Itching, rash and erythema that started 2 days post implant. He reports calling on call MD at that time who told him it was normal healing. His site began to have drainage and worsening itching, rash up his neck and into midline. Took benedryl with some improvement. No pain, no fever. He came to ED last night with worsening discharge, purulent/green that would immediately dry to his skin. He notes a Dermabond allergy with similar however less extensive sx with right inguinal hernia repair a few years ago. Cardiac risk factors: dyslipidemia, obesity, male gender.     Patient Active Problem List    Diagnosis Date Noted    Infected pacemaker ([address] 10/03/2021    SSS (sick sinus syndrome) ([address] 09/20/2021    Hymenoptera allergy 08/17/2021    Mild intermittent asthma without complication 86/39/5751    Left hip pain 02/12/2021    Bloating 41/44/8836    Eosinophilic esophagitis 74/65/0869    Bilateral carotid artery stenosis 11/01/2019    Gastroesophageal reflux disease 09/09/2019    Severe obesity ([address] 02/04/2019    Syncope and collapse 01/14/2019    Class 2 obesity due to excess calories without serious comorbidity in adult 07/19/2018    BPH with obstruction/lower urinary tract symptoms 01/30/2018    Cervical radiculopathy at C7 01/30/2018      Hilario Isidro MD  Past Medical History:   Diagnosis Date    Acid reflux     Acute pain of right shoulder 1/30/2018    Asthma     Atrial fibrillation ([address] 2020    Carotid artery disease (HCC)     Fainting spell     Hyperlipidemia     Joint pain     Muscle ache     Muscle pain     Muscle weakness     Sinus problem     Urine troubles       Social History     Socioeconomic History    Marital status:      Spouse name: Holli Reyna    Number of children: Not on file    Years of education: Not on file    Highest education level: Not on file   Occupational History    Occupation:      Comment: [employer]   Tobacco Use    Smoking status: Never Smoker    Smokeless tobacco: Never Used   Vaping Use    Vaping Use: Never used   Substance and Sexual Activity    Alcohol use: No    Drug use: No    Sexual activity: Yes     Partners: Female   Other Topics Concern     Service No     Social Determinants of Health     Financial Resource Strain:     Difficulty of Paying Living Expenses:    Food Insecurity:     Worried About Running Out of Food in the Last Year:     Ran Out of Food in the Last Year:    Transportation Needs:     Lack of Transportation (Medical):     Lack of Transportation (Non-Medical):    Physical Activity:     Days of Exercise per Week:     Minutes of Exercise per Session:    Stress:     Feeling of Stress :    Social Connections:     Frequency of Communication with Friends and Family:     Frequency of Social Gatherings with Friends and Family:     Attends Worship Services:      Active Member of Clubs or Organizations:     Attends Club or Organization Meetings:     Marital Status:      Allergies   Allergen Reactions    Dermabond [Octyl 2-Cyanoacrylate] Atopic Dermatitis    Hornet Venom Swelling     Carries Epi pen    Adhesive Rash     Dermabond      Family History   Problem Relation Age of Onset    No Known Problems Mother     Heart Attack Father     No Known Problems Sister     No Known Problems Brother     No Known Problems Maternal Grandmother     No Known Problems Maternal Grandfather     Heart Failure Paternal Grandmother     Cancer Paternal Grandfather         esophagus    No Known Problems Other       Current Facility-Administered Medications   Medication Dose Route Frequency    enoxaparin (LOVENOX) injection 40 mg  40 mg SubCUTAneous 7am    piperacillin-tazobactam (ZOSYN) 3.375 g in 0.9% sodium chloride (MBP/ADV) 100 mL MBP  3.375 g IntraVENous Q8H    atorvastatin (LIPITOR) tablet 40 mg  40 mg Oral DAILY    famotidine (PEPCID) tablet 40 mg  40 mg Oral QHS    fluticasone propionate (FLONASE) 50 mcg/actuation nasal spray 2 Spray  2 Spray Both Nostrils DAILY    montelukast (SINGULAIR) tablet 10 mg  10 mg Oral DAILY    acetaminophen (TYLENOL) tablet 650 mg  650 mg Oral Q4H PRN    vancomycin (VANCOCIN) 1500 mg in  ml infusion  1,500 mg IntraVENous Q12H     Current Outpatient Medications   Medication Sig    EPINEPHrine (EPIPEN) 0.3 mg/0.3 mL injection INJECT 0.3ML BY INTRAMUSCLAR ROUTE ONCE AS NEEDED FOR ALLERGIC RESPONSE FOR UP TO 1 DOSE    atorvastatin (LIPITOR) 40 mg tablet Take 1 Tablet by mouth daily. famotidine (PEPCID) 40 mg tablet Take 40 mg by mouth nightly. pantoprazole (PROTONIX) 40 mg tablet Take 40 mg by mouth two (2) times a day.    ketotifen (ZADITOR) 0.025 % (0.035 %) ophthalmic solution Administer 1 Drop to both eyes two (2) times daily as needed (itchy watery eyes). guaiFENesin (Mucinex) 1,200 mg Ta12 ER tablet Take 1 Tablet by mouth two (2) times a day. (Patient taking differently: Take 1,200 mg by mouth as needed.)    fluticasone propionate (FLONASE) 50 mcg/actuation nasal spray 1 spray each nostril twice daily (Patient taking differently: 2 Sprays by Both Nostrils route as needed.)    montelukast (SINGULAIR) 10 mg tablet Take 1 Tablet by mouth daily. albuterol (PROVENTIL HFA, VENTOLIN HFA, PROAIR HFA) 90 mcg/actuation inhaler Take 2 Puffs by inhalation every four (4) hours as needed for Wheezing.     pseudoephedrine (SUDAFED) 60 mg tablet Take 1 Tab by mouth every six (6) hours as needed for Congestion. docosahexanoic acid/epa (FISH OIL PO) Take  by mouth daily. Review of Symptoms:  11 systems reviewed, negative other than as stated in the HPI     Subjective:      Visit Vitals  /82   Pulse 77   Temp 98.5 °F (36.9 °C)   Resp 17   Ht 6' (1.829 m)   Wt 283 lb 4.7 oz (128.5 kg)   SpO2 93%   BMI 38.42 kg/m²       Physical:    General: WD, WN. Alert, cooperative, no acute distress  Heart: Regular, S1 WNL and S2 WNL. No S3 or S4, no m/S3/JVD, no carotid bruits   Lungs: clear   Skin: left subclavian site approximated well, erythematous, purulent   Abdomen: Soft, +BS, NTND   Extremities: LE weston +DP/PT, no edema   Neurologic: Grossly normal    Data Review:   Recent Labs     10/03/21  1823   WBC 10.4   HGB 15.0   HCT 44.4        Recent Labs     10/03/21  1823      K 3.6      CO2 28   *   BUN 14   CREA 1.00   CA 8.6   ALB 3.6   TBILI 0.4   ALT 51       Recent Labs     10/03/21  1823   TROIQ <0.05         Intake/Output Summary (Last 24 hours) at 10/4/2021 0854  Last data filed at 10/4/2021 0109  Gross per 24 hour   Intake 600 ml   Output --   Net 600 ml        Cardiographics    Tele:  sinus    EKG: sinus     CXR:    FINDINGS: Single AP portable view of the chest obtained at [address] demonstrates a  stable cardiomediastinal silhouette. The lungs are clear bilaterally. There is a  left-sided pacer device. A suspected cardiac monitor projects over the left  lower hemithorax. No osseous abnormalities are seen. IMPRESSION  No evidence of acute cardiopulmonary process. Assessment:           Active Problems:    Infected pacemaker [facility]) (10/3/2021)         Plan:     Destiny Pink is a pleasant 52year old male s/p dual chamber ppm 9/20/21 who presented to ED with possible CIED. Afebrile, normal WBC. Site approximated well  Blood cultures and site culture sent. If CIED, will need extraction.    With hx of of allergy to dermabond, high probability to skin irritation secondary to closure with surgical glue. He is ok for d/c home today, await results. ABX per hospitalist. Follow up in clinic with device interrogation tomorrow. Thank you for this interesting consultation. Evonne Hawthorne ANP    Patient seen and examined by me with nurse practitioner. I personally performed all components of the history, physical, and medical decision making and agree with the assessment and plan with minor modifications as noted. Pt with redness/itching at incision site likely secondary to dermabond allergy. Nl wbc and afebrile. Cultures are pending but does not appear septic. Ok for [employer] from ep standpoint. Leave incision to air.  Follow up in clinic tmrw    Sol Alaniz MD, Gavin Patiño

## 2021-10-04 NOTE — ADT AUTH CERT NOTES
INPATIENT ADMISSION NOTIFICATION     ADMISSION DATE 10/03/2021    UR CONTACT - [address] [phone]  UR [phone]    Elfego Solorzano!! Comments  Comment     Last edited by  on [address] Ina Jin     FACILITY NPI :[id number]  FACILITY TAX ID : [id number]     [facility] EMERGENCY DEPT  [address]  [address] [address] [phone]            Patient Name :Belinda Kent   : 1970 (52 yrs)  MRN : [id number]     Patient Mailing Address [address] [place] [address] [47] , [address]                                                               .         Insurance Plan Payor: Javon Kumar / Plan: [employer] [facility] / Product Type: PPO /      Primary Coverage Subscriber ID : [id number]     :        Current Patient Class : INPATIENT  Admit Date : 10/3/2021     REQUESTED LEVEL OF CARE: INPATIENT [101]                                                           Diagnosis : Infected pacemaker [facility])                          ICD10 Code : Infected pacemaker ([facility] [address] [Q96. 7XXA]    Admitting and Attending Info:  Admitting Provider : Donte Ashley MD   NPI: [id number]  Admitting Provider Phone. [phone]  Admitting Provider Address: SAME AS FACILITY               Patient Demographics    Patient Name   Judy Eddy Legal Sex   Male    1970 Address   [address] [place] [address] Road [address] Phone   [phone] (Home) *Preferred*   [phone] (Work)   [phone] [facility] Account    Name Acct ID Class Status Primary Coverage   Judy Eddy [id number] INPATIENT Open BLUE CROSS - [place] BL[place]OSS [place]ERAL          Guarantor Account (for Hospital Account [de-identified])    Name Relation to Pt Service Area Active?  Acct Type   Judy Eddy Self [facility] Yes Personal/Family   Address Phone     [address] [facility] [address] [phone](N)   [phone](J)            Coverage Information (for Hospital Account [de-identified])    F/O Payor/Plan Subscriber  Subscriber Sex Amadeo #   [facility] 74 F    Subscriber Subscriber #   Rodger Velez [id number]   [facility] # Group Name   [id number] [facility] [place] [facility]   Address Phone   [address]    Policy Number Status Effective Date Benefits Phone   [id number] -  -   Auth/Cert             Diagnosis     Codes Comments   Pacemaker infection, initial encounter ([facility] [address]  ICD-10-CM: T82. 7XXA   ICD-9-CM: 996.61           Admission Information    Arrival Date/Time: 10/03/2021 1800 Admit Date/Time: 10/03/2021 2028 IP Adm.  Date/Time: 10/03/2021 2056   Admission Type: Emergency Point of Origin: [address] [place] [address] Facility/self Admit Category: Home   Means of Arrival: Car Primary Service: Medicine Secondary Service: N/A   Transfer Source:  Service Area: [facility] Unit: [facility] EMERGENCY DEPT   Admit Provider: Delia Zhang MD Attending Provider: Mary Carmen Awad MD Referring Provider:    Admission Information    Attending Provider Admission Dx Admitted on    Infected pacemaker [facility]) 10/03/21   Service Isolation Code Status   MEDICINE  Full Code   Allergies Advance Care Planning    Dermabond [Octyl 2-cyanoacrylate], Hornet Venom, Adhesive Jump to the Activity     Admission Information    Unit/Bed: [facility] EMERGENCY DEPT/12 Service: MEDICINE   Admitting provider: Delia Zhang MD Phone: [phone]   Attending provider:  Phone:    PCP: Sarah Virgen MD Phone: [phone]   Admission dx:  Patient class: I   Admission type: ER     Patient Demographics    Patient Name   Carlos Lemus   [id number] Legal Sex   Male    1970 Address   [address] [place] [address] Road [address] Phone   [phone] (Home) *Preferred*   [phone] (Work)   [phone] (Mobile)   H&P Notes     H&P by Pearl Arnold MD at 10/03/21 2222 documented on ED from 10/3/2021 in [facility] EMERGENCY DEPT  Author: Rob Neville MD Author Type: Physician Filed: 10/03/21 2229   Note Status: Signed Cosign: Cosign Not Required Date of Service: 10/03/21 2222   : Rob Neville MD (Physician)   Expand AllCollapse All                    Hospitalist Admission Note     NAME:            Montserrat Hawkins   :               1970   MRN:               [id number]      Date/Time:      10/3/2021 10:22 PM     Patient PCP: Ambika Tellez MD  _____________________________________________________________________  Given the patient's current clinical presentation, I have a high level of concern for decompensation if discharged from the emergency department.  Complex decision making was performed, which includes reviewing the patient's available past medical records, laboratory results, and x-ray films.        My assessment of this patient's clinical condition and my plan of care is as follows.     Assessment / Plan:  Pacemaker site infection  Consult cardiology  Continue with vancomycin and Zosyn  Call pharmacy for Vanco kinetics  Follow-up wound and blood cultures  Will most likely need pacemaker extraction     Hyperlipidemia  Continue with atorvastatin     GERD  C/w pepcid        Code Status: Full  Surrogate Decision Maker: Wife     DVT Prophylaxis: Lovenox  GI Prophylaxis: not indicated     Baseline: Independent                    Subjective:   CHIEF COMPLAINT: Redness and pus from pacemaker site     HISTORY OF PRESENT ILLNESS:     Montserrat Hawkins is a 52 y.o. 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 Patient states that he had itching 4 days ago and then over the past 2 days he developed redness and pus coming out of his pacemaker site. Patient underwent a pacemaker on . Patient tolerated procedure well up until 4 days ago. At first the itching was all over and he took some Benadryl which initially helped. However, over the last 2 days he had drainage and worsening redness coming from his pacemaker site. He called the cardiologist office instructed him to come to ED for further evaluation. He denies any chest pain or shortness of breath. No nausea vomiting diarrhea.   No fevers or chills     We were asked to admit for work up and evaluation of the above problems.           Past Medical History:   Diagnosis Date    Acid reflux      Acute pain of right shoulder 1/30/2018    Asthma      Atrial fibrillation ([address] 2020    Carotid artery disease (HCC)      Fainting spell      Hyperlipidemia      Joint pain      Muscle ache      Muscle pain      Muscle weakness      Sinus problem      Urine troubles                 Past Surgical History:   Procedure Laterality Date    HX AFIB ABLATION   09/18/2020    HX ENDOSCOPY   2020    HX ENDOSCOPY        HX ROTATOR CUFF REPAIR   04/03/2018    HX VASECTOMY   2017    INTRACARD ECHO, THER/DX INTERVENT N/A 9/18/2020     Intracardiac Echocardiogram performed by Rachna Rabago MD at [facility] CARDIAC CATH LAB    NH ABDOMEN SURGERY PROC UNLISTED         bilat ing hernia repair with repeat R    NH ABDOMEN SURGERY PROC UNLISTED         umbilical hernia repair    NH CARDIAC SURG PROCEDURE UNLIST   05/2020     loop recorder insertion    NH EPHYS EVAL W/ABLATION SUPRAVENT ARRHYTHMIA N/A 9/18/2020     ABLATION SVT performed by Rachna Rabago MD at [facility] CARDIAC CATH LAB    NH EPHYS EVL TRNSPTL TX ATRIAL FIB ISOLAT PULM VEIN N/A 9/18/2020     ABLATION A-FIB  W COMPLETE EP STUDY performed by Rachna Rabago MD at [facility] CARDIAC CATH LAB    NH ICAR CATHETER ABLATION ARRHYTHMIA ADD ON N/A 9/18/2020     Ablation Svt/Vt Add On performed by Rachna Rabago MD at [facility] CARDIAC CATH LAB    NH INSERTION SUBQ CARDIAC RHYTHM MONITOR W/PRGRMG N/A 5/15/2020     LOOP RECORDER INSERT performed by Camilla Isabel MD at [facility] CARDIAC CATH LAB    FL INTRACARDIAC ELECTROPHYSIOLOGIC 3D MAPPING N/A 9/18/2020     Ep 3d Mapping performed by Camilla Isabel MD at [address] [place] [address] CARDIAC CATH LAB         Social History           Tobacco Use    Smoking status: Never Smoker    Smokeless tobacco: Never Used   Substance Use Topics    Alcohol use: No               Family History   Problem Relation Age of Onset    No Known Problems Mother      Heart Attack Father      No Known Problems Sister      No Known Problems Brother      No Known Problems Maternal Grandmother      No Known Problems Maternal Grandfather      Heart Failure Paternal Grandmother      Cancer Paternal Grandfather           esophagus    No Known Problems Other              Allergies   Allergen Reactions    Hornet Venom Swelling       Carries Epi pen    Adhesive Rash       Dermabond                 Prior to Admission medications    Medication Sig Start Date End Date Taking? Authorizing Provider   EPINEPHrine (EPIPEN) 0.3 mg/0.3 mL injection INJECT 0.3ML BY INTRAMUSCLAR ROUTE ONCE AS NEEDED FOR ALLERGIC RESPONSE FOR UP TO 1 DOSE 8/17/21     Provider, Historical   atorvastatin (LIPITOR) 40 mg tablet Take 1 Tablet by mouth daily. 8/19/21     Ezra Isidro MD   famotidine (PEPCID) 40 mg tablet Take 40 mg by mouth nightly. 6/28/21     Provider, Historical   pantoprazole (PROTONIX) 40 mg tablet Take 40 mg by mouth two (2) times a day. 8/5/21     Provider, Historical   ketotifen (ZADITOR) 0.025 % (0.035 %) ophthalmic solution Administer 1 Drop to both eyes two (2) times daily as needed (itchy watery eyes). 8/17/21     Ezra Isidro MD   guaiFENesin (Mucinex) 1,200 mg Ta12 ER tablet Take 1 Tablet by mouth two (2) times a day. Patient taking differently: Take 1,200 mg by mouth as needed.  8/17/21     Ezra Isidro MD   fluticasone propionate (FLONASE) 50 mcg/actuation nasal spray 1 spray each nostril twice daily  Patient taking differently: 2 Sprays by Both Nostrils route as needed. 8/17/21     Irineo Isidro MD   montelukast (SINGULAIR) 10 mg tablet Take 1 Tablet by mouth daily. 8/17/21     Irineo Isidro MD   albuterol (PROVENTIL HFA, VENTOLIN HFA, PROAIR HFA) 90 mcg/actuation inhaler Take 2 Puffs by inhalation every four (4) hours as needed for Wheezing. 8/17/21     Irineo Isidro MD   pseudoephedrine (SUDAFED) 60 mg tablet Take 1 Tab by mouth every six (6) hours as needed for Congestion.  8/13/20     Irineo Isidro MD   docosahexanoic acid/epa (FISH OIL PO) Take  by mouth daily.       Provider, Historical         REVIEW OF SYSTEMS:     I am not able to complete the review of systems because:    The patient is intubated and sedated     The patient has altered mental status due to his acute medical problems     The patient has baseline aphasia from prior stroke(s)     The patient has baseline dementia and is not reliable historian     The patient is in acute medical distress and unable to provide information               Total of 12 systems reviewed as follows:                                        POSITIVE= underlined text  Negative = text not underlined  General:                     fever, chills, sweats, generalized weakness, weight loss/gain,                                       loss of appetite   Eyes:                           blurred vision, eye pain, loss of vision, double vision  ENT:                            rhinorrhea, pharyngitis   Respiratory:               cough, sputum production, SOB, GODDARD, wheezing, pleuritic pain   Cardiology:                chest pain, palpitations, orthopnea, PND, edema, syncope   Gastrointestinal:       abdominal pain , N/V, diarrhea, dysphagia, constipation, bleeding   Genitourinary:           frequency, urgency, dysuria, hematuria, incontinence   Muskuloskeletal :      arthralgia, myalgia, back pain  Hematology: easy bruising, nose or gum bleeding, lymphadenopathy   Dermatological:         rash, ulceration, pruritis, color change / jaundice  Endocrine:                 hot flashes or polydipsia   Neurological:             headache, dizziness, confusion, focal weakness, paresthesia,                                      Speech difficulties, memory loss, gait difficulty  Psychological:          Feelings of anxiety, depression, agitation     Objective:   VITALS:    Visit Vitals  /79   Pulse 78   Temp 98.1 °F (36.7 °C)   Resp 20   Ht 6' (1.829 m)   Wt 128.5 kg (283 lb 4.7 oz)   SpO2 95%   BMI 38.42 kg/m²         PHYSICAL EXAM:     General:          Alert, cooperative, no distress, appears stated age. HEENT:           Atraumatic, anicteric sclerae, pink conjunctivae                          No oral ulcers, mucosa moist, throat clear, dentition fair  Neck:               Supple, symmetrical,  thyroid: non tender  Lungs:             Clear to auscultation bilaterally. No Wheezing or Rhonchi. No rales. Chest wall:      No tenderness  No Accessory muscle use. Heart:              Regular  rhythm,  No  murmur   No edema  Abdomen:        Soft, non-tender. Not distended. Bowel sounds normal  Extremities:     No cyanosis. No clubbing,                            Skin turgor normal, Capillary refill normal, Radial dial pulse 2+  Skin:                Not pale. Not Jaundiced left chest wall with surgical site exudate and erythema. No exposed wires or device visualized  Psych:             Good insight. Not depressed. Not anxious or agitated. Neurologic:      EOMs intact. No facial asymmetry. No aphasia or slurred speech. Symmetrical strength, Sensation grossly intact.  Alert and oriented X 4.      _______________________________________________________________________  Care Plan discussed with:      Comments   Patient x     Family  x     RN       Care Manager                      Consultant:      _______________________________________________________________________  Expected  Disposition:   Home with Family     HH/PT/OT/RN     SNF/LTC     CYNDEE     ________________________________________________________________________  TOTAL TIME:  28  Minutes     Critical Care Provided     Minutes non procedure based         Comments       Reviewed previous records   >50% of visit spent in counseling and coordination of care   Discussion with patient and/or family and questions answered         Given the patient's current clinical presentation, I have a high level of concern for decompensation if discharged from the ED. Complex decision making was performed which includes reviewing the patient's available past medical records, laboratory results, and Xray films. I have also directly communicated my plan and discussed this case with the involved ED physician.      ____________________________________________________________________  Jackie Pack MD     Procedures: see electronic medical records for all procedures/Xrays and details which were not copied into this note but were reviewed prior to creation of Plan.     LAB DATA REVIEWED:    Recent Results          Recent Results (from the past 24 hour(s))   CBC WITH AUTOMATED DIFF     Collection Time: 10/03/21  6:23 PM   Result Value Ref Range     WBC 10.4 4.1 - 11.1 K/uL     RBC 5.20 4. 10 - 5.70 M/uL     HGB 15.0 12.1 - 17.0 g/dL     HCT 44.4 36.6 - 50.3 %     MCV 85.4 80.0 - 99.0 FL     MCH 28.8 26.0 - 34.0 PG     MCHC 33.8 30.0 - 36.5 g/dL     RDW 12.8 11.5 - 14.5 %     PLATELET 238 343 - 396 K/uL     MPV 8.8 (L) 8.9 - 12.9 FL     NRBC 0.0 0  WBC     ABSOLUTE NRBC 0.00 0.00 - 0.01 K/uL     NEUTROPHILS 56 32 - 75 %     LYMPHOCYTES 28 12 - 49 %     MONOCYTES 7 5 - 13 %     EOSINOPHILS 7 0 - 7 %     BASOPHILS 1 0 - 1 %     IMMATURE GRANULOCYTES 1 (H) 0.0 - 0.5 %     ABS. NEUTROPHILS 5.9 1.8 - 8.0 K/UL     ABS. LYMPHOCYTES 2.9 0.8 - 3.5 K/UL     ABS.  MONOCYTES 0.8 0.0 - 1.0 K/UL     ABS. EOSINOPHILS 0.7 (H) 0.0 - 0.4 K/UL     ABS. BASOPHILS 0.1 0.0 - 0.1 K/UL     ABS. IMM. GRANS. 0.1 (H) 0.00 - 0.04 K/UL     DF AUTOMATED     METABOLIC PANEL, COMPREHENSIVE     Collection Time: 10/03/21  6:23 PM   Result Value Ref Range     Sodium 138 136 - 145 mmol/L     Potassium 3.6 3.5 - 5.1 mmol/L     Chloride 104 97 - 108 mmol/L     CO2 28 21 - 32 mmol/L     Anion gap 6 5 - 15 mmol/L     Glucose 115 (H) 65 - 100 mg/dL     BUN 14 6 - 20 MG/DL     Creatinine 1.00 0.70 - 1.30 MG/DL     BUN/Creatinine ratio 14 12 - 20       GFR est AA >60 >60 ml/min/1.73m2     GFR est non-AA >60 >60 ml/min/1.73m2     Calcium 8.6 8.5 - 10.1 MG/DL     Bilirubin, total 0.4 0.2 - 1.0 MG/DL     ALT (SGPT) 51 12 - 78 U/L     AST (SGOT) 27 15 - 37 U/L     Alk.  phosphatase 111 45 - 117 U/L     Protein, total 7.4 6.4 - 8.2 g/dL     Albumin 3.6 3.5 - 5.0 g/dL     Globulin 3.8 2.0 - 4.0 g/dL     A-G Ratio 0.9 (L) 1.1 - 2.2     TROPONIN I     Collection Time: 10/03/21  6:23 PM   Result Value Ref Range     Troponin-I, Qt. <0.05 <0.05 ng/mL   EKG, 12 LEAD, INITIAL     Collection Time: 10/03/21  6:25 PM   Result Value Ref Range     Ventricular Rate 86 BPM     Atrial Rate 86 BPM     P-R Interval 156 ms     QRS Duration 86 ms     Q-T Interval 370 ms     QTC Calculation (Bezet) 442 ms     Calculated P Axis 69 degrees     Calculated R Axis 15 degrees     Calculated T Axis 18 degrees     Diagnosis           Normal sinus rhythm  Nonspecific T wave abnormality  No previous ECGs available                Patient Demographics    Patient Name   Sheryl West   [id number] Legal Sex   Male    1970 Address   [address] [facility] [address] Phone   [phone] (Home) *Preferred*   [phone] (Work)   [phone] (Mobile)   CSN:   [id number]   Admit Date: Admit Time Room Bed   Oct 3, 2021  8:28 PM AI89 [[id number]] 09 [[id number]]   Attending Providers    Provider Pager From To   Akosua Almendarez MD  10/03/21 10/03/21   Nadya Pitts MD  10/03/21 10/04/21   Valentin Ceballos MD  10/04/21 10/04/21   Tisha Corrales MD  10/04/21 10/04/21   Emergency Contact(s)    Name Relation Home Work [address] [place] [address] Spouse [phone]   Utilization Reviews       Cardiology [facility] Day 1 (10/3/2021) by Ulisses Monet       Review Entered Review Status   10/4/2021 09:35 Completed      Criteria Review      Care Day: 1 Care Date: 10/3/2021 Level of Care: Telemetry    Guideline Day 1    Clinical Status    (X) * Clinical Indications met    10/4/2021 09:35:35 EDT by Peter Morris pacemaker on September 20 now with 2 days redness and drainage from site    Interventions    (X) Inpatient interventions as needed    10/4/2021 09:35:35 EDT by Evonne Ortega      tylenol 650mg q4hr pen PO- once  pepcid 40mg qhs PO  zosyn 3.375g once IV then q8hr  vancomycin 2500mg once IV then vancomycin 1500mg bid IV    Cardiology consulted  wound culture  OOB with assist  regular diet    * Milestone   Additional Notes   10/03      Assessment / Plan:   Pacemaker site infection   Consult cardiology   Continue with vancomycin and Zosyn   Call pharmacy for Vanco kinetics   Follow-up wound and blood cultures   Will most likely need pacemaker extraction       Hyperlipidemia   Continue with atorvastatin       GERD   C/w pepcid           Code Status: Full   Surrogate Decision Maker: Wife       DVT Prophylaxis: Lovenox   GI Prophylaxis: not indicated       Baseline:  Independent                      Subjective:   CHIEF COMPLAINT: Redness and pus from pacemaker site       HISTORY OF PRESENT ILLNESS:      Anna Lucero is a 52 y.o. 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  Patient states that he had itching 4 days ago and then over the past 2 days he developed redness and pus coming out of his pacemaker site.  Patient underwent a pacemaker on September 20.  Patient tolerated procedure well up until 4 days ago. Sonia Griffith first the itching was all over and he took some Benadryl which initially helped. [facility], over the last 2 days he had drainage and worsening redness coming from his pacemaker site. [facility] called the cardiologist office instructed him to come to ED for further evaluation. [facility] denies any chest pain or shortness of breath.  No nausea vomiting diarrhea.  No fevers or chills           PHYSICAL EXAM:       General:          Alert, cooperative, no distress, appears stated age.      HEENT:           Atraumatic, anicteric sclerae, pink conjunctivae                           No oral ulcers, mucosa moist, throat clear, dentition fair   Neck:               Supple, symmetrical,  thyroid: non tender   Lungs:             Clear to auscultation bilaterally.  No Wheezing or Rhonchi. No rales. Chest wall:      No tenderness  No Accessory muscle use. Heart:              Regular  rhythm,  No  murmur   No edema   Abdomen:        Soft, non-tender. Not distended.  Bowel sounds normal   Extremities:     No cyanosis.  No clubbing,                             Skin turgor normal, Capillary refill normal, Radial dial pulse 2+   Skin:                Not pale.  Not Jaundiced left chest wall with surgical site exudate and erythema.  No exposed wires or device visualized   Psych:             Good insight.  Not depressed.  Not anxious or agitated. Neurologic:      EOMs intact. No facial asymmetry. No aphasia or slurred speech. Symmetrical strength, Sensation grossly intact.  Alert and oriented X 4.           ED note   HPI: Ifeanyi Miranda, 52 y.o. male pacemaker placed by Dr. Link Wen on [phone] presenting to ED with pacemaker site infection.  He notes erythema and purulent drainage from the site. [facility] states it is very itchy.  He otherwise feels well.        98.1 °F (36.7 °C) 88 163/84 110 - At rest 18 98 % None (Room air)       ECG   Normal sinus rhythm    Nonspecific T wave abnormality       CXR   No evidence of acute cardiopulmonary process.       Blood culture pending      Wound culture pending      Cardiology GRG - Clinical Indications for Admission to Inpatient Care by Reymundo Wright       Review Entered Review Status   10/4/2021 09:32 Completed      Criteria Review      Clinical Indications for Admission to Inpatient Care    Most Recent : Reymundo Wright Most Recent Date: 10/4/2021 09:32:03 EDT    (X) Hospital admission is needed for appropriate care of the patient because of  1 or more  of    the following  (1) (2) (3) (4) (5) (6) (7) (8) (9):       (X) Surgical or device complication (eg, valve replacement complication, pacemaker, LVAD or ICD       dysfunction) (39) (46) (52) (50) (52) (48) (46) (46) (53)       10/4/2021 09:32:03 EDT by Reymundo Wright         2-day history of redness and drainage coming from his pacemaker site that was placed on September 20th

## 2021-10-04 NOTE — H&P
Hospitalist Admission Note    NAME: Siva Mckeon   :  1970   MRN:  [id number]     Date/Time:  10/3/2021 10:22 PM    Patient PCP: Migel Guzman MD  _____________________________________________________________________  Given the patient's current clinical presentation, I have a high level of concern for decompensation if discharged from the emergency department. Complex decision making was performed, which includes reviewing the patient's available past medical records, laboratory results, and x-ray films. My assessment of this patient's clinical condition and my plan of care is as follows. Assessment / Plan:  Pacemaker site infection  Consult cardiology  Continue with vancomycin and Zosyn  Call pharmacy for Vanco kinetics  Follow-up wound and blood cultures  Will most likely need pacemaker extraction    Hyperlipidemia  Continue with atorvastatin    GERD  C/w pepcid      Code Status: Full  Surrogate Decision Maker: Wife    DVT Prophylaxis: Lovenox  GI Prophylaxis: not indicated    Baseline: Independent        Subjective:   CHIEF COMPLAINT: Redness and pus from pacemaker site    HISTORY OF PRESENT ILLNESS:     Siva Mckeon is a 52 y.o. 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 Patient states that he had itching 4 days ago and then over the past 2 days he developed redness and pus coming out of his pacemaker site. Patient underwent a pacemaker on . Patient tolerated procedure well up until 4 days ago. At first the itching was all over and he took some Benadryl which initially helped. However, over the last 2 days he had drainage and worsening redness coming from his pacemaker site. He called the cardiologist office instructed him to come to ED for further evaluation. He denies any chest pain or shortness of breath. No nausea vomiting diarrhea. No fevers or chills    We were asked to admit for work up and evaluation of the above problems.      Past Medical History:   Diagnosis Date    Acid reflux     Acute pain of right shoulder 1/30/2018    Asthma     Atrial fibrillation ([address] 2020    Carotid artery disease (HCC)     Fainting spell     Hyperlipidemia     Joint pain     Muscle ache     Muscle pain     Muscle weakness     Sinus problem     Urine troubles         Past Surgical History:   Procedure Laterality Date    HX AFIB ABLATION  09/18/2020    HX ENDOSCOPY  2020    HX ENDOSCOPY      HX ROTATOR CUFF REPAIR  04/03/2018    HX VASECTOMY  2017    INTRACARD ECHO, THER/DX INTERVENT N/A 9/18/2020    Intracardiac Echocardiogram performed by Hanh Carrillo MD at [facility] CARDIAC [facility]ATH LAB    NM ABDOMEN SURGERY PROC UNLISTED      bilat ing hernia repair with repeat R    NM ABDOMEN SURGERY PROC UNLISTED      umbilical hernia repair    NM CARDIAC SURG PROCEDURE UNLIST  05/2020    loop recorder insertion    NM EPHYS EVAL W/ABLATION SUPRAVENT ARRHYTHMIA N/A 9/18/2020    ABLATION SVT performed by Hanh Carrillo MD at [facility] CARDIAC CATH LAB    NM EPHYS EVL TRNSPTL TX ATRIAL FIB ISOLAT PULM VEIN N/A 9/18/2020    ABLATION A-FIB  W COMPLETE EP STUDY performed by Hanh Carrillo MD at [facility] CARDIAC CATH LAB    NM ICAR CATHETER ABLATION ARRHYTHMIA ADD ON N/A 9/18/2020    Ablation Svt/Vt Add On performed by Hanh Carrillo MD at [facility] CARDIAC CATH LAB    NM INSERTION SUBQ CARDIAC RHYTHM MONITOR W/PRGRMG N/A 5/15/2020    LOOP RECORDER INSERT performed by Hanh Carrillo MD at [facility] CARDIAC CATH LAB    NM INTRACARDIAC ELECTROPHYSIOLOGIC 3D MAPPING N/A 9/18/2020    Ep 3d Mapping performed by Hanh Carrillo MD at [facility] CARDIAC CATH LAB       Social History     Tobacco Use    Smoking status: Never Smoker    Smokeless tobacco: Never Used   Substance Use Topics    Alcohol use: No        Family History   Problem Relation Age of Onset    No Known Problems Mother     Heart Attack Father     No Known Problems Sister     No Known Problems Brother     No Known Problems Maternal Grandmother     No Known Problems Maternal Grandfather     Heart Failure Paternal Grandmother     Cancer Paternal Grandfather         esophagus    No Known Problems Other      Allergies   Allergen Reactions    Hornet Venom Swelling     Carries Epi pen    Adhesive Rash     Dermabond        Prior to Admission medications    Medication Sig Start Date End Date Taking? Authorizing Provider   EPINEPHrine (EPIPEN) 0.3 mg/0.3 mL injection INJECT 0.3ML BY INTRAMUSCLAR ROUTE ONCE AS NEEDED FOR ALLERGIC RESPONSE FOR UP TO 1 DOSE 8/17/21   Provider, Historical   atorvastatin (LIPITOR) 40 mg tablet Take 1 Tablet by mouth daily. 8/19/21   Lenin Isidro MD   famotidine (PEPCID) 40 mg tablet Take 40 mg by mouth nightly. 6/28/21   Provider, Historical   pantoprazole (PROTONIX) 40 mg tablet Take 40 mg by mouth two (2) times a day. 8/5/21   Provider, Historical   ketotifen (ZADITOR) 0.025 % (0.035 %) ophthalmic solution Administer 1 Drop to both eyes two (2) times daily as needed (itchy watery eyes). 8/17/21   Lenin Isidro MD   guaiFENesin (Mucinex) 1,200 mg Ta12 ER tablet Take 1 Tablet by mouth two (2) times a day. Patient taking differently: Take 1,200 mg by mouth as needed. 8/17/21   Lenin Isidro MD   fluticasone propionate (FLONASE) 50 mcg/actuation nasal spray 1 spray each nostril twice daily  Patient taking differently: 2 Sprays by Both Nostrils route as needed. 8/17/21   Lenin Isidro MD   montelukast (SINGULAIR) 10 mg tablet Take 1 Tablet by mouth daily. 8/17/21   Lenin Isidro MD   albuterol (PROVENTIL HFA, VENTOLIN HFA, PROAIR HFA) 90 mcg/actuation inhaler Take 2 Puffs by inhalation every four (4) hours as needed for Wheezing.  8/17/21   Lenin Isidro MD pseudoephedrine (SUDAFED) 60 mg tablet Take 1 Tab by mouth every six (6) hours as needed for Congestion. 8/13/20   Corona Isidro MD   docosahexanoic acid/epa (FISH OIL PO) Take  by mouth daily. Provider, Historical       REVIEW OF SYSTEMS:     I am not able to complete the review of systems because: The patient is intubated and sedated    The patient has altered mental status due to his acute medical problems    The patient has baseline aphasia from prior stroke(s)    The patient has baseline dementia and is not reliable historian    The patient is in acute medical distress and unable to provide information           Total of 12 systems reviewed as follows:       POSITIVE= underlined text  Negative = text not underlined  General:  fever, chills, sweats, generalized weakness, weight loss/gain,      loss of appetite   Eyes:    blurred vision, eye pain, loss of vision, double vision  ENT:    rhinorrhea, pharyngitis   Respiratory:   cough, sputum production, SOB, GODDARD, wheezing, pleuritic pain   Cardiology:   chest pain, palpitations, orthopnea, PND, edema, syncope   Gastrointestinal:  abdominal pain , N/V, diarrhea, dysphagia, constipation, bleeding   Genitourinary:  frequency, urgency, dysuria, hematuria, incontinence   Muskuloskeletal :  arthralgia, myalgia, back pain  Hematology:  easy bruising, nose or gum bleeding, lymphadenopathy   Dermatological: rash, ulceration, pruritis, color change / jaundice  Endocrine:   hot flashes or polydipsia   Neurological:  headache, dizziness, confusion, focal weakness, paresthesia,     Speech difficulties, memory loss, gait difficulty  Psychological: Feelings of anxiety, depression, agitation    Objective:   VITALS:    Visit Vitals  /79   Pulse 78   Temp 98.1 °F (36.7 °C)   Resp 20   Ht 6' (1.829 m)   Wt 128.5 kg (283 lb 4.7 oz)   SpO2 95%   BMI 38.42 kg/m²       PHYSICAL EXAM:    General:    Alert, cooperative, no distress, appears stated age. HEENT: Atraumatic, anicteric sclerae, pink conjunctivae     No oral ulcers, mucosa moist, throat clear, dentition fair  Neck:  Supple, symmetrical,  thyroid: non tender  Lungs:   Clear to auscultation bilaterally. No Wheezing or Rhonchi. No rales. Chest wall:  No tenderness  No Accessory muscle use. Heart:   Regular  rhythm,  No  murmur   No edema  Abdomen:   Soft, non-tender. Not distended. Bowel sounds normal  Extremities: No cyanosis. No clubbing,      Skin turgor normal, Capillary refill normal, Radial dial pulse 2+  Skin:     Not pale. Not Jaundiced left chest wall with surgical site exudate and erythema. No exposed wires or device visualized  Psych:  Good insight. Not depressed. Not anxious or agitated. Neurologic: EOMs intact. No facial asymmetry. No aphasia or slurred speech. Symmetrical strength, Sensation grossly intact. Alert and oriented X 4.     _______________________________________________________________________  Care Plan discussed with:    Comments   Patient x    Family  x    RN     Care Manager                    Consultant:      _______________________________________________________________________  Expected  Disposition:   Home with Family    HH/PT/OT/RN    SNF/LTC    CYNDEE    ________________________________________________________________________  TOTAL TIME:  28  Minutes    Critical Care Provided     Minutes non procedure based      Comments     Reviewed previous records   >50% of visit spent in counseling and coordination of care  Discussion with patient and/or family and questions answered       Given the patient's current clinical presentation, I have a high level of concern for decompensation if discharged from the ED. Complex decision making was performed which includes reviewing the patient's available past medical records, laboratory results, and Xray films. I have also directly communicated my plan and discussed this case with the involved ED physician. ____________________________________________________________________  Ashley Ascencio MD    Procedures: see electronic medical records for all procedures/Xrays and details which were not copied into this note but were reviewed prior to creation of Plan. LAB DATA REVIEWED:    Recent Results (from the past 24 hour(s))   CBC WITH AUTOMATED DIFF    Collection Time: 10/03/21  6:23 PM   Result Value Ref Range    WBC 10.4 4.1 - 11.1 K/uL    RBC 5.20 4. 10 - 5.70 M/uL    HGB 15.0 12.1 - 17.0 g/dL    HCT 44.4 36.6 - 50.3 %    MCV 85.4 80.0 - 99.0 FL    MCH 28.8 26.0 - 34.0 PG    MCHC 33.8 30.0 - 36.5 g/dL    RDW 12.8 11.5 - 14.5 %    PLATELET 536 658 - 855 K/uL    MPV 8.8 (L) 8.9 - 12.9 FL    NRBC 0.0 0  WBC    ABSOLUTE NRBC 0.00 0.00 - 0.01 K/uL    NEUTROPHILS 56 32 - 75 %    LYMPHOCYTES 28 12 - 49 %    MONOCYTES 7 5 - 13 %    EOSINOPHILS 7 0 - 7 %    BASOPHILS 1 0 - 1 %    IMMATURE GRANULOCYTES 1 (H) 0.0 - 0.5 %    ABS. NEUTROPHILS 5.9 1.8 - 8.0 K/UL    ABS. LYMPHOCYTES 2.9 0.8 - 3.5 K/UL    ABS. MONOCYTES 0.8 0.0 - 1.0 K/UL    ABS. EOSINOPHILS 0.7 (H) 0.0 - 0.4 K/UL    ABS. BASOPHILS 0.1 0.0 - 0.1 K/UL    ABS. IMM. GRANS. 0.1 (H) 0.00 - 0.04 K/UL    DF AUTOMATED     METABOLIC PANEL, COMPREHENSIVE    Collection Time: 10/03/21  6:23 PM   Result Value Ref Range    Sodium 138 136 - 145 mmol/L    Potassium 3.6 3.5 - 5.1 mmol/L    Chloride 104 97 - 108 mmol/L    CO2 28 21 - 32 mmol/L    Anion gap 6 5 - 15 mmol/L    Glucose 115 (H) 65 - 100 mg/dL    BUN 14 6 - 20 MG/DL    Creatinine 1.00 0.70 - 1.30 MG/DL    BUN/Creatinine ratio 14 12 - 20      GFR est AA >60 >60 ml/min/1.73m2    GFR est non-AA >60 >60 ml/min/1.73m2    Calcium 8.6 8.5 - 10.1 MG/DL    Bilirubin, total 0.4 0.2 - 1.0 MG/DL    ALT (SGPT) 51 12 - 78 U/L    AST (SGOT) 27 15 - 37 U/L    Alk.  phosphatase 111 45 - 117 U/L    Protein, total 7.4 6.4 - 8.2 g/dL    Albumin 3.6 3.5 - 5.0 g/dL    Globulin 3.8 2.0 - 4.0 g/dL    A-G Ratio 0.9 (L) 1.1 - 2.2 TROPONIN I    Collection Time: 10/03/21  6:23 PM   Result Value Ref Range    Troponin-I, Qt. <0.05 <0.05 ng/mL   EKG, 12 LEAD, INITIAL    Collection Time: 10/03/21  6:25 PM   Result Value Ref Range    Ventricular Rate 86 BPM    Atrial Rate 86 BPM    P-R Interval 156 ms    QRS Duration 86 ms    Q-T Interval 370 ms    QTC Calculation (Bezet) 442 ms    Calculated P Axis 69 degrees    Calculated R Axis 15 degrees    Calculated T Axis 18 degrees    Diagnosis       Normal sinus rhythm  Nonspecific T wave abnormality  No previous ECGs available

## 2021-10-04 NOTE — DISCHARGE SUMMARY
Hospitalist Discharge Summary     Patient ID:  Yessenia Perla  [id number]  27 y.o.  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 Afib s/p ablation s/p PPM  Hyperlipidemia  GERD  Full code    CONSULTATIONS:  IP CONSULT TO CARDIOLOGY  IP CONSULT TO HOSPITALIST    Excerpted HPI from H&P of Kings Chatterjee MD:  Kali.D[age] y.o. 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 Patient states that he had itching 4 days ago and then over the past 2 days he developed redness and pus coming out of his pacemaker site. Patient underwent a pacemaker on September 20. Patient tolerated procedure well up until 4 days ago. At first the itching was all over and he took some Benadryl which initially helped. However, over the last 2 days he had drainage and worsening redness coming from his pacemaker site. He called the cardiologist office instructed him to come to ED for further evaluation. He denies any chest pain or shortness of breath. No nausea vomiting diarrhea. No fevers or chills     We were asked to admit for work up and evaluation of the above problems. \"    ______________________________________________________________________  DISCHARGE SUMMARY/HOSPITAL COURSE:  for full details see H&P, daily progress notes, labs, consult notes.        Pacemaker site infection  Consult cardiology  Continue with vancomycin and Zosyn  Call pharmacy for Vanco kinetics  Follow-up wound and blood cultures  Will most likely need pacemaker extraction     Hyperlipidemia  Continue with atorvastatin     GERD  C/w pepcid        Code Status: Full  Surrogate Decision Maker: Wife      _______________________________________________________________________  Patient seen and examined by me on discharge day. Pertinent Findings:  Gen:    Not in distress  Chest: Clear lungs  CVS:   Regular rhythm. No edema  Abd:  Soft, not distended, not tender  Neuro:  Alert, oriented x 3  _______________________________________________________________________  DISCHARGE MEDICATIONS:   Current Discharge Medication List      START taking these medications    Details   cetirizine (ZyrTEC) 10 mg tablet Take 1 Tablet by mouth daily for 5 days. Qty: 5 Tablet, Refills: 0  Start date: 10/4/2021, End date: 10/9/2021      cephALEXin (Keflex) 500 mg capsule Take 1 Capsule by mouth four (4) times daily for 5 days. Qty: 20 Capsule, Refills: 0  Start date: 10/4/2021, End date: 10/9/2021      Saccharomyces boulardii (Florastor) 250 mg capsule Take 1 Capsule by mouth two (2) times a day for 7 days. Qty: 14 Capsule, Refills: 0  Start date: 10/4/2021, End date: 10/11/2021      bacitracin (BACITRACIN) 500 unit/gram oint Apply  to affected area three (3) times daily for 5 days. Qty: 1 Each, Refills: 0  Start date: 10/4/2021, End date: 10/9/2021         CONTINUE these medications which have NOT CHANGED    Details   EPINEPHrine (EPIPEN) 0.3 mg/0.3 mL injection INJECT 0.3ML BY INTRAMUSCLAR ROUTE ONCE AS NEEDED FOR ALLERGIC RESPONSE FOR UP TO 1 DOSE      atorvastatin (LIPITOR) 40 mg tablet Take 1 Tablet by mouth daily. Qty: 90 Tablet, Refills: 1      famotidine (PEPCID) 40 mg tablet Take 40 mg by mouth nightly. pantoprazole (PROTONIX) 40 mg tablet Take 40 mg by mouth two (2) times a day.      ketotifen (ZADITOR) 0.025 % (0.035 %) ophthalmic solution Administer 1 Drop to both eyes two (2) times daily as needed (itchy watery eyes). Qty: 10 mL, Refills: 3    Associated Diagnoses:  Allergic conjunctivitis of both eyes      guaiFENesin (Mucinex) 1,200 mg Ta12 ER tablet Take 1 Tablet by mouth two (2) times a day. Qty: 20 Tablet, Refills: 1    Associated Diagnoses: Chronic allergic rhinitis      fluticasone propionate (FLONASE) 50 mcg/actuation nasal spray 1 spray each nostril twice daily  Qty: 3 Bottle, Refills: 1    Associated Diagnoses: Chronic allergic rhinitis      montelukast (SINGULAIR) 10 mg tablet Take 1 Tablet by mouth daily. Qty: 90 Tablet, Refills: 1    Associated Diagnoses: Chronic allergic rhinitis      albuterol (PROVENTIL HFA, VENTOLIN HFA, PROAIR HFA) 90 mcg/actuation inhaler Take 2 Puffs by inhalation every four (4) hours as needed for Wheezing. Qty: 1 Inhaler, Refills: 3    Associated Diagnoses: Wheezing      pseudoephedrine (SUDAFED) 60 mg tablet Take 1 Tab by mouth every six (6) hours as needed for Congestion. Qty: 40 Tab, Refills: 1    Associated Diagnoses: Chronic allergic rhinitis      docosahexanoic acid/epa (FISH OIL PO) Take  by mouth daily. Patient Follow Up Instructions: Activity: Activity as tolerated  Diet: Cardiac Diet  Wound Care: keep incision area dry, apply bacitracin 3 x day as instructed    Follow-up with Dr Cinda Patel  in 1 week.       Follow-up Information     Follow up With Specialties Details Why Contact Edwina Huerta MD [place] [id number]  [address] [facility]  [phone]          ________________________________________________________________    Risk of deterioration: Low    Condition at Discharge:  Stable  __________________________________________________________________    Disposition  Home with family, no needs    ____________________________________________________________________    Code Status: Full Code  ___________________________________________________________________      Total time in minutes spent coordinating this discharge (includes going over instructions, follow-up, prescriptions, and preparing report for sign off to her PCP) :  >30 minutes    Signed:  Rafy Fernandez MD

## 2021-10-04 NOTE — ED PROVIDER NOTES
EMERGENCY DEPARTMENT HISTORY AND PHYSICAL EXAM      Date: 10/3/2021  Patient Name: Douglas Negrete    History of Presenting Illness     Chief Complaint   Patient presents with    Wound Check     drainage from pacemaker insertion site with redness and chills, Dr. Gertrudis Acosta office referred to ED, medtronic pacemaker         HPI: Douglas Negrete, 52 y.o. male pacemaker placed by Dr. Kemar Grajeda on [phone] presenting to ED with pacemaker site infection. He notes erythema and purulent drainage from the site. He states it is very itchy. He otherwise feels well. There are no other complaints, changes, or physical findings at this time. PCP: Luz Allen MD    No current facility-administered medications on file prior to encounter. Current Outpatient Medications on File Prior to Encounter   Medication Sig Dispense Refill    EPINEPHrine (EPIPEN) 0.3 mg/0.3 mL injection INJECT 0.3ML BY INTRAMUSCLAR ROUTE ONCE AS NEEDED FOR ALLERGIC RESPONSE FOR UP TO 1 DOSE      atorvastatin (LIPITOR) 40 mg tablet Take 1 Tablet by mouth daily. 90 Tablet 1    famotidine (PEPCID) 40 mg tablet Take 40 mg by mouth nightly.  pantoprazole (PROTONIX) 40 mg tablet Take 40 mg by mouth two (2) times a day.  ketotifen (ZADITOR) 0.025 % (0.035 %) ophthalmic solution Administer 1 Drop to both eyes two (2) times daily as needed (itchy watery eyes). 10 mL 3    guaiFENesin (Mucinex) 1,200 mg Ta12 ER tablet Take 1 Tablet by mouth two (2) times a day. (Patient taking differently: Take 1,200 mg by mouth as needed.) 20 Tablet 1    fluticasone propionate (FLONASE) 50 mcg/actuation nasal spray 1 spray each nostril twice daily (Patient taking differently: 2 Sprays by Both Nostrils route as needed.) 3 Bottle 1    montelukast (SINGULAIR) 10 mg tablet Take 1 Tablet by mouth daily.  90 Tablet 1    albuterol (PROVENTIL HFA, VENTOLIN HFA, PROAIR HFA) 90 mcg/actuation inhaler Take 2 Puffs by inhalation every four (4) hours as needed for Wheezing. 1 Inhaler 3    pseudoephedrine (SUDAFED) 60 mg tablet Take 1 Tab by mouth every six (6) hours as needed for Congestion. 40 Tab 1    docosahexanoic acid/epa (FISH OIL PO) Take  by mouth daily.          Past History     Past Medical History:  Past Medical History:   Diagnosis Date    Acid reflux     Acute pain of right shoulder 1/30/2018    Asthma     Atrial fibrillation ([address] 2020    Carotid artery disease (HCC)     Fainting spell     Hyperlipidemia     Joint pain     Muscle ache     Muscle pain     Muscle weakness     Sinus problem     Urine troubles        Past Surgical History:  Past Surgical History:   Procedure Laterality Date    HX AFIB ABLATION  09/18/2020    HX ENDOSCOPY  2020    HX ENDOSCOPY      HX ROTATOR CUFF REPAIR  04/03/2018    HX VASECTOMY  2017    INTRACARD ECHO, THER/DX INTERVENT N/A 9/18/2020    Intracardiac Echocardiogram performed by Steve Gupta MD at [facility] CARDIAC CATH LAB    TX ABDOMEN SURGERY PROC UNLISTED      bilat ing hernia repair with repeat R    TX ABDOMEN SURGERY PROC UNLISTED      umbilical hernia repair    TX CARDIAC SURG PROCEDURE UNLIST  05/2020    loop recorder insertion    TX EPHYS EVAL W/ABLATION SUPRAVENT ARRHYTHMIA N/A 9/18/2020    ABLATION SVT performed by Steve Gupta MD at [facility] CARDIAC CATH LAB    TX EPHYS EVL TRNSPTL TX ATRIAL FIB ISOLAT PULM VEIN N/A 9/18/2020    ABLATION A-FIB  W COMPLETE EP STUDY performed by Steve Gupta MD at [facility] CARDIAC CATH LAB    TX ICAR CATHETER ABLATION ARRHYTHMIA ADD ON N/A 9/18/2020    Ablation Svt/Vt Add On performed by Steve Gupta MD at [facility] CARDIAC CATH LAB    TX INSERTION SUBQ CARDIAC RHYTHM MONITOR W/PRGRMG N/A 5/15/2020    LOOP RECORDER INSERT performed by Steve Gupta MD at [facility] CARDIAC CATH LAB    TX INTRACARDIAC ELECTROPHYSIOLOGIC 3D MAPPING N/A 9/18/2020    Ep 3d Mapping performed by Steve Gupta MD at [address] History:  Family History   Problem Relation Age of Onset    No Known Problems Mother     Heart Attack Father     No Known Problems Sister     No Known Problems Brother     No Known Problems Maternal Grandmother     No Known Problems Maternal Grandfather     Heart Failure Paternal Grandmother     Cancer Paternal Grandfather         esophagus    No Known Problems Other        Social History:  Social History     Tobacco Use    Smoking status: Never Smoker    Smokeless tobacco: Never Used   Vaping Use    Vaping Use: Never used   Substance Use Topics    Alcohol use: No    Drug use: No       Allergies: Allergies   Allergen Reactions    Hornet Venom Swelling     Carries Epi pen    Adhesive Rash     Dermabond         Review of Systems   Review of Systems   Constitutional: Negative for chills and fever. Skin: Positive for wound. All other systems reviewed and are negative. Physical Exam   Physical Exam  Vitals and nursing note reviewed. Constitutional:       Appearance: Normal appearance. Cardiovascular:      Rate and Rhythm: Normal rate. Pulmonary:      Effort: No respiratory distress. Skin:     Comments: L ant chest wall w pacemaker insertion site wound w scabbing noted, serosanguinous and purulent drainage noted, erythema noted surrounding site, no obvious fluctuance or induration   Neurological:      Mental Status: He is alert. Diagnostic Study Results     Labs -     Recent Results (from the past 24 hour(s))   CBC WITH AUTOMATED DIFF    Collection Time: 10/03/21  6:23 PM   Result Value Ref Range    WBC 10.4 4.1 - 11.1 K/uL    RBC 5.20 4. 10 - 5.70 M/uL    HGB 15.0 12.1 - 17.0 g/dL    HCT 44.4 36.6 - 50.3 %    MCV 85.4 80.0 - 99.0 FL    MCH 28.8 26.0 - 34.0 PG    MCHC 33.8 30.0 - 36.5 g/dL    RDW 12.8 11.5 - 14.5 %    PLATELET 445 541 - 316 K/uL    MPV 8.8 (L) 8.9 - 12.9 FL    NRBC 0.0 0  WBC    ABSOLUTE NRBC 0.00 0.00 - 0.01 K/uL    NEUTROPHILS 56 32 - 75 %    LYMPHOCYTES 28 12 - 49 %    MONOCYTES 7 5 - 13 %    EOSINOPHILS 7 0 - 7 %    BASOPHILS 1 0 - 1 %    IMMATURE GRANULOCYTES 1 (H) 0.0 - 0.5 %    ABS. NEUTROPHILS 5.9 1.8 - 8.0 K/UL    ABS. LYMPHOCYTES 2.9 0.8 - 3.5 K/UL    ABS. MONOCYTES 0.8 0.0 - 1.0 K/UL    ABS. EOSINOPHILS 0.7 (H) 0.0 - 0.4 K/UL    ABS. BASOPHILS 0.1 0.0 - 0.1 K/UL    ABS. IMM. GRANS. 0.1 (H) 0.00 - 0.04 K/UL    DF AUTOMATED     METABOLIC PANEL, COMPREHENSIVE    Collection Time: 10/03/21  6:23 PM   Result Value Ref Range    Sodium 138 136 - 145 mmol/L    Potassium 3.6 3.5 - 5.1 mmol/L    Chloride 104 97 - 108 mmol/L    CO2 28 21 - 32 mmol/L    Anion gap 6 5 - 15 mmol/L    Glucose 115 (H) 65 - 100 mg/dL    BUN 14 6 - 20 MG/DL    Creatinine 1.00 0.70 - 1.30 MG/DL    BUN/Creatinine ratio 14 12 - 20      GFR est AA >60 >60 ml/min/1.73m2    GFR est non-AA >60 >60 ml/min/1.73m2    Calcium 8.6 8.5 - 10.1 MG/DL    Bilirubin, total 0.4 0.2 - 1.0 MG/DL    ALT (SGPT) 51 12 - 78 U/L    AST (SGOT) 27 15 - 37 U/L    Alk. phosphatase 111 45 - 117 U/L    Protein, total 7.4 6.4 - 8.2 g/dL    Albumin 3.6 3.5 - 5.0 g/dL    Globulin 3.8 2.0 - 4.0 g/dL    A-G Ratio 0.9 (L) 1.1 - 2.2     TROPONIN I    Collection Time: 10/03/21  6:23 PM   Result Value Ref Range    Troponin-I, Qt. <0.05 <0.05 ng/mL   EKG, 12 LEAD, INITIAL    Collection Time: 10/03/21  6:25 PM   Result Value Ref Range    Ventricular Rate 86 BPM    Atrial Rate 86 BPM    P-R Interval 156 ms    QRS Duration 86 ms    Q-T Interval 370 ms    QTC Calculation (Bezet) 442 ms    Calculated P Axis 69 degrees    Calculated R Axis 15 degrees    Calculated T Axis 18 degrees    Diagnosis       Normal sinus rhythm  Nonspecific T wave abnormality  No previous ECGs available         Radiologic Studies -   XR CHEST PORT   Final Result   No evidence of acute cardiopulmonary process.            CT Results  (Last 48 hours)    None        CXR Results  (Last 48 hours)               10/03/21 4841  XR CHEST PORT Final result    Impression:  No evidence of acute cardiopulmonary process. Narrative:  INDICATION:  chest pain        COMPARISON: September 20       FINDINGS: Single AP portable view of the chest obtained at [address] demonstrates a   stable cardiomediastinal silhouette. The lungs are clear bilaterally. There is a   left-sided pacer device. A suspected cardiac monitor projects over the left   lower hemithorax. No osseous abnormalities are seen. Medical Decision Making   I am the first provider for this patient. I reviewed the vital signs, available nursing notes, past medical history, past surgical history, family history and social history. Vital Signs-Reviewed the patient's vital signs. Patient Vitals for the past 24 hrs:   Temp Pulse Resp BP SpO2   10/03/21 1817 98.1 °F (36.7 °C) 88 18 (!) 163/84 98 %         Provider Notes (Medical Decision Making):   Very pleasant, well-appearing 47-year-old presenting to ED with chief complaint of erythema surrounding his pacemaker site as well as purulent appearing drainage. This is confirmed on my exam.  Basic labs are within normal limits, patient is afebrile, does not seem to be septic chest x-ray is normal.  EKG is normal.  Spoke with Dr. Garrett Clark who recommends IV antibiotics, admission to hospital, and Dr. Silvio Escalona will evaluate pt in AM.    EKG sinus, rate normal, nonspecific st T changes, normal QT    ED Course:     Initial assessment performed. The patients presenting problems have been discussed, and they are in agreement with the care plan formulated and outlined with them. I have encouraged them to ask questions as they arise throughout their visit. Disposition:  admit    PLAN:  1. Current Discharge Medication List        2.    Follow-up Information    None       Return to ED if worse     Diagnosis     Clinical Impression: pacemaker site infection

## 2021-10-04 NOTE — DISCHARGE INSTRUCTIONS
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 Afib s/p ablation s/p PPM  Hyperlipidemia  GERD  Full code    Active Problems:    Infected pacemaker ([facility] [address] (10/3/2021)         MEDICATIONS:  As per medication reconciliation  list  · It is important that you take the medication exactly as they are prescribed. · Keep your medication in the bottles provided by the pharmacist and keep a list of the medication names, dosages, and times to be taken in your wallet. · Do not take other medications without consulting your doctor. Pain Management: per above medications    What to do at Home    Recommended diet:  Cardiac Diet    Recommended activity: Activity as tolerated    If you have questions regarding the hospital related prescriptions or hospital related issues please call [facility] bebe Bailey at [phone]. If you experience any of the following symptoms then please call your primary care physician or return to the emergency room if you cannot get hold of your doctor:  Fever, chills, nausea, vomiting, diarrhea, change in mentation, falling, bleeding, shortness of breath,     Follow Up:  Dr. Patricia Hendrix MD  you are to call and set up an appointment to see them in 7-10 days. Dr Mack Coleamn in 1 week for PPM site check    Information obtained by :  I understand that if any problems occur once I am at home I am to contact my physician. I understand and acknowledge receipt of the instructions indicated above. Physician's or R.N.'s Signature                                                                  Date/Time                                                                                                                                              Patient or Representative Signature                                                          Date/Time

## 2021-10-04 NOTE — ED NOTES
PO Cruz reviewed discharge instructions with the patient. The patient verbalized understanding. All questions and concerns were addressed. The patient declined a wheelchair and is discharged ambulatory in the care of family members with instructions and prescriptions in hand. Pt is alert and oriented x 4. Respirations are clear and unlabored.

## 2021-10-05 ENCOUNTER — OFFICE VISIT (OUTPATIENT)
Dept: CARDIOLOGY CLINIC | Age: 51
End: 2021-10-05
Payer: COMMERCIAL

## 2021-10-05 DIAGNOSIS — Z95.0 CARDIAC PACEMAKER IN SITU: Primary | ICD-10-CM

## 2021-10-05 DIAGNOSIS — I49.5 SSS (SICK SINUS SYNDROME) (HCC): ICD-10-CM

## 2021-10-05 PROCEDURE — 93280 PM DEVICE PROGR EVAL DUAL: CPT | Performed by: INTERNAL MEDICINE

## 2021-10-05 NOTE — PROGRESS NOTES
Patient feels well following pacemaker implantation. Subclavian pacemker site approximated well, no discharge but redness, and irritation at site and up towards neck due to adhesive allergy. Patient states it looks better than before. No heat. Device interrogation WNL. Follow up as planned in 3 mo. See report scanned to chart. Chargeable visit.     Zee Rausch, PO

## 2021-10-05 NOTE — PROGRESS NOTES
Pt is on Keflex. Chart review showed patient listed as an inpatient.  Final results and sensitivities pending

## 2021-10-07 LAB
BACTERIA SPEC CULT: ABNORMAL
BACTERIA SPEC CULT: ABNORMAL
GRAM STN SPEC: ABNORMAL
SERVICE CMNT-IMP: ABNORMAL

## 2021-10-09 LAB
BACTERIA SPEC CULT: NORMAL
SERVICE CMNT-IMP: NORMAL

## 2021-10-11 ENCOUNTER — TELEPHONE (OUTPATIENT)
Dept: CARDIOLOGY CLINIC | Age: 51
End: 2021-10-11

## 2021-10-11 NOTE — TELEPHONE ENCOUNTER
Patient is calling to let Dr. Whtie Cough nurse that his pacemaker box has not come in yet. Please come back and advise.       Callback #: [phone]        Thanks,  Moni Dinh

## 2022-01-11 ENCOUNTER — OFFICE VISIT (OUTPATIENT)
Dept: CARDIOLOGY CLINIC | Age: 52
End: 2022-01-11
Payer: COMMERCIAL

## 2022-01-11 ENCOUNTER — OFFICE VISIT (OUTPATIENT)
Dept: CARDIOLOGY CLINIC | Age: 52
End: 2022-01-11

## 2022-01-11 VITALS
RESPIRATION RATE: 18 BRPM | BODY MASS INDEX: 40.9 KG/M2 | WEIGHT: 302 LBS | HEIGHT: 72 IN | HEART RATE: 80 BPM | OXYGEN SATURATION: 98 % | DIASTOLIC BLOOD PRESSURE: 96 MMHG | SYSTOLIC BLOOD PRESSURE: 148 MMHG

## 2022-01-11 DIAGNOSIS — Z95.0 CARDIAC PACEMAKER IN SITU: Primary | ICD-10-CM

## 2022-01-11 DIAGNOSIS — I48.91 ATRIAL FIBRILLATION, UNSPECIFIED TYPE (HCC): ICD-10-CM

## 2022-01-11 DIAGNOSIS — R55 SYNCOPE, UNSPECIFIED SYNCOPE TYPE: ICD-10-CM

## 2022-01-11 DIAGNOSIS — I49.5 SSS (SICK SINUS SYNDROME) (HCC): ICD-10-CM

## 2022-01-11 DIAGNOSIS — R55 SYNCOPE AND COLLAPSE: ICD-10-CM

## 2022-01-11 DIAGNOSIS — E66.09 CLASS 2 OBESITY DUE TO EXCESS CALORIES WITHOUT SERIOUS COMORBIDITY IN ADULT, UNSPECIFIED BMI: ICD-10-CM

## 2022-01-11 DIAGNOSIS — I49.3 PVC (PREMATURE VENTRICULAR CONTRACTION): ICD-10-CM

## 2022-01-11 DIAGNOSIS — I48.91 ATRIAL FIBRILLATION, UNSPECIFIED TYPE (HCC): Primary | ICD-10-CM

## 2022-01-11 PROCEDURE — 99214 OFFICE O/P EST MOD 30 MIN: CPT | Performed by: INTERNAL MEDICINE

## 2022-01-11 PROCEDURE — 93280 PM DEVICE PROGR EVAL DUAL: CPT | Performed by: INTERNAL MEDICINE

## 2022-01-11 PROCEDURE — 93000 ELECTROCARDIOGRAM COMPLETE: CPT | Performed by: INTERNAL MEDICINE

## 2022-01-11 RX ORDER — OMEPRAZOLE 10 MG/1
10 CAPSULE, DELAYED RELEASE ORAL DAILY
COMMUNITY
End: 2022-08-15 | Stop reason: ALTCHOICE

## 2022-01-11 NOTE — LETTER
1/11/2022    Patient: Tesha Samson   YOB: 1970   Date of Visit: 1/11/2022     Shellie Phalen., MD  [address] [place] [id number]  [address] [facility]    Dear Shellie Phalen., MD,      Thank you for referring Mr. Tesha Samson to [address] [place] [address] [place] for evaluation. My notes for this consultation are attached. If you have questions, please do not hesitate to call me. I look forward to following your patient along with you.       Sincerely,    Berkley Mcclendon MD

## 2022-01-11 NOTE — PROGRESS NOTES
1. Have you been to the ER, urgent care clinic since your last visit? Hospitalized since your last visit? Yes, 10/3/21, ED, Pacemaker Infection    2. Have you seen or consulted any other health care providers outside of the [address] [place] [address] since your last visit? Include any pap smears or colon screening.  No           Chief Complaint   Patient presents with    Follow-up     Pt denies cardiac symptoms

## 2022-01-11 NOTE — PROGRESS NOTES
Subjective: Frank Mcarthur is a 54 y.o. male is here for EP consult. The patient denies chest pain/ shortness of breath, orthopnea, PND, LE edema, palpitations, syncope, presyncope or fatigue.        Patient Active Problem List    Diagnosis Date Noted    Infected pacemaker ([facility] 10/03/2021    SSS (sick sinus syndrome) ([address] 09/20/2021    Hymenoptera allergy 08/17/2021    Mild intermittent asthma without complication 03/61/8096    Left hip pain 02/12/2021    Bloating 62/24/3573    Eosinophilic esophagitis 85/25/5818    Bilateral carotid artery stenosis 11/01/2019    Gastroesophageal reflux disease 09/09/2019    Severe obesity ([address] 02/04/2019    Syncope and collapse 01/14/2019    Class 2 obesity due to excess calories without serious comorbidity in adult 07/19/2018    BPH with obstruction/lower urinary tract symptoms 01/30/2018    Cervical radiculopathy at C7 01/30/2018      Hope Isidro MD  Past Medical History:   Diagnosis Date    Acid reflux     Acute pain of right shoulder 1/30/2018    Asthma     Atrial fibrillation ([address] 2020    Carotid artery disease (HCC)     Fainting spell     Hyperlipidemia     Joint pain     Muscle ache     Muscle pain     Muscle weakness     Pacemaker     Sinus problem     Urine troubles       Past Surgical History:   Procedure Laterality Date    HX AFIB ABLATION  09/18/2020    HX ENDOSCOPY  2020    HX ENDOSCOPY      HX PACEMAKER      HX ROTATOR CUFF REPAIR  04/03/2018    HX VASECTOMY  2017    OK ABDOMEN SURGERY PROC UNLISTED      bilat ing hernia repair with repeat R    OK ABDOMEN SURGERY PROC UNLISTED      umbilical hernia repair    OK CARDIAC SURG PROCEDURE UNLIST  05/2020    loop recorder insertion    OK COMPRE EP EVAL ABLTJ 3D MAPG TX SVT N/A 9/18/2020    ABLATION SVT performed by Shella Goodell, MD at [facility] CARDIAC CATH LAB    OK COMPRE EP EVAL ABLTJ ATR FIB PULM VEIN ISOLATION N/A 9/18/2020    ABLATION A-FIB  W COMPLETE EP STUDY performed by Armen Mederos MD at [facility] CARDIAC CATH LAB    NC ICAR CATHETER ABLATION ARRHYTHMIA ADD ON N/A 9/18/2020    Ablation Svt/Vt Add On performed by Armen Mederos MD at [facility] CARDIAC CATH LAB    NC INSERTION SUBQ CARDIAC RHYTHM MONITOR W/PRGRMG N/A 5/15/2020    LOOP RECORDER INSERT performed by Armen [facility]eros MD at [facility] CARDIAC CATH LAB    NC INTRACARD ECHO, THER/DX INTERVENT N/A 9/18/2020    Intracardiac Echocardiogram performed by Armen Mederos MD at [facility] CARDIAC CATH LAB    NC INTRACARDIAC ELECTROPHYSIOLOGIC 3D MAPPING N/A 9/18/2020    Ep 3d Mapping performed by Armen Mederos MD at [facility] CARDIAC CATH LAB     Allergies   Allergen Reactions    Dermabond [Octyl 2-Cyanoacrylate] Atopic Dermatitis    Hornet Venom Swelling     Carries Epi pen    Adhesive Rash     Dermabond      Family History   Problem Relation Age of Onset    No Known Problems Mother     Heart Attack Father     No Known Problems Sister     No Known Problems Brother     No Known Problems Maternal Grandmother     No Known Problems Maternal Grandfather     Heart Failure Paternal Grandmother     Cancer Paternal Grandfather         esophagus    No Known Problems Other     negative for cardiac disease  Social History     Socioeconomic History    Marital status:      Spouse name: John Humphrey   Occupational History    Occupation:      Comment: [employer]   Tobacco Use    Smoking status: Never Smoker    Smokeless tobacco: Never Used   Vaping Use    Vaping Use: Never used   Substance and Sexual Activity    Alcohol use: No    Drug use: No    Sexual activity: Yes     Partners: Female   Other Topics Concern     Service No     Current Outpatient Medications   Medication Sig    omeprazole (PRILOSEC) 10 mg capsule Take 10 mg by mouth daily.     EPINEPHrine (EPIPEN) 0.3 mg/0.3 mL injection INJECT 0.3ML BY INTRAMUSCLAR ROUTE ONCE AS NEEDED FOR ALLERGIC RESPONSE FOR UP TO 1 DOSE    atorvastatin (LIPITOR) 40 mg tablet Take 1 Tablet by mouth daily.  famotidine (PEPCID) 40 mg tablet Take 40 mg by mouth nightly.  pantoprazole (PROTONIX) 40 mg tablet Take 40 mg by mouth two (2) times a day.  ketotifen (ZADITOR) 0.025 % (0.035 %) ophthalmic solution Administer 1 Drop to both eyes two (2) times daily as needed (itchy watery eyes).  guaiFENesin (Mucinex) 1,200 mg Ta12 ER tablet Take 1 Tablet by mouth two (2) times a day. (Patient taking differently: Take 1,200 mg by mouth as needed.)    fluticasone propionate (FLONASE) 50 mcg/actuation nasal spray 1 spray each nostril twice daily (Patient taking differently: 2 Sprays by Both Nostrils route as needed.)    montelukast (SINGULAIR) 10 mg tablet Take 1 Tablet by mouth daily. (Patient taking differently: Take 10 mg by mouth as needed.)    albuterol (PROVENTIL HFA, VENTOLIN HFA, PROAIR HFA) 90 mcg/actuation inhaler Take 2 Puffs by inhalation every four (4) hours as needed for Wheezing.  pseudoephedrine (SUDAFED) 60 mg tablet Take 1 Tab by mouth every six (6) hours as needed for Congestion.  docosahexanoic acid/epa (FISH OIL PO) Take  by mouth daily. (Patient not taking: Reported on 1/11/2022)     No current facility-administered medications for this visit. Vitals:    01/11/22 1301 01/11/22 1319   BP: (!) 154/98 (!) 148/96   Pulse: 80    Resp: 18    SpO2: 98%    Weight: 302 lb (137 kg)    Height: 6' (1.829 m)        I have reviewed the nurses notes, vitals, problem list, allergy list, medical history, family, social history and medications. Review of Symptoms:    General: Pt denies excessive weight gain or loss. Pt is able to conduct ADL's  HEENT: Denies blurred vision, headaches, hearing loss, epistaxis and difficulty swallowing. Respiratory: Denies cough, congestion, shortness of breath, GODDARD, wheezing or stridor.   Cardiovascular: Denies precordial pain, palpitations, edema or PND  Gastrointestinal: Denies poor appetite, indigestion, abdominal pain or blood in stool  Genitourinary: Denies hematuria, dysuria, increased urinary frequency  Musculoskeletal: Denies joint pain or swelling from muscles or joints  Neurologic: Denies tremor, paresthesias, headache, or sensory motor disturbance  Psychiatric: Denies confusion, insomnia, depression  Integumentray: Denies rash, itching or ulcers. Hematologic: Denies easy bruising, bleeding    Physical Exam:      General: Well developed, in no acute distress. HEENT: Eyes - PERRL, no jvd  Heart:  Normal S1/S2 negative S3 or S4. Regular, no murmur, gallop or rub. Respiratory: Clear bilaterally x 4, no wheezing or rales  Abdomen:   Soft, non-tender, bowel sounds are active. Extremities:  No edema, normal cap refill, no cyanosis. Musculoskeletal: No clubbing  Neuro: A&Ox3, speech clear, gait stable. Skin: Skin color is normal. No rashes or lesions.  Non diaphoretic, no ulcers or subcutaneous nodule  Vascular: 2+ pulses symmetric in all extremities  Psych - judgement intact and orientation is wnl     Cardiographics    Ekg: nsr    Pacer - A paced 13.8%    Results for orders placed or performed during the hospital encounter of 10/03/21   EKG, 12 LEAD, INITIAL   Result Value Ref Range    Ventricular Rate 86 BPM    Atrial Rate 86 BPM    P-R Interval 156 ms    QRS Duration 86 ms    Q-T Interval 370 ms    QTC Calculation (Bezet) 442 ms    Calculated P Axis 69 degrees    Calculated R Axis 15 degrees    Calculated T Axis 18 degrees    Diagnosis       Normal sinus rhythm  Nonspecific T wave abnormality  No previous ECGs available  Confirmed by Alfreda Sims P.VJeanette ([id number]) on 10/4/2021 12:45:53 PM           Lab Results   Component Value Date/Time    WBC 10.4 10/03/2021 06:23 PM    HGB 15.0 10/03/2021 06:23 PM    HCT 44.4 10/03/2021 06:23 PM    PLATELET 811 67/97/0993 06:23 PM    MCV 85.4 10/03/2021 06:23 PM      Lab Results   Component Value Date/Time    Sodium 138 10/03/2021 06:23 PM    Potassium 3.6 10/03/2021 06:23 PM    Chloride 104 10/03/2021 06:23 PM    CO2 28 10/03/2021 06:23 PM    Anion gap 6 10/03/2021 06:23 PM    Glucose 115 (H) 10/03/2021 06:23 PM    BUN 14 10/03/2021 06:23 PM    Creatinine 1.00 10/03/2021 06:23 PM    BUN/Creatinine ratio 14 10/03/2021 06:23 PM    GFR est AA >60 10/03/2021 06:23 PM    GFR est non-AA >60 10/03/2021 06:23 PM    Calcium 8.6 10/03/2021 06:23 PM    Bilirubin, total 0.4 10/03/2021 06:23 PM    Alk. phosphatase 111 10/03/2021 06:23 PM    Protein, total 7.4 10/03/2021 06:23 PM    Albumin 3.6 10/03/2021 06:23 PM    Globulin 3.8 10/03/2021 06:23 PM    A-G Ratio 0.9 (L) 10/03/2021 06:23 PM    ALT (SGPT) 51 10/03/2021 06:23 PM         Assessment:     Assessment:        ICD-10-CM ICD-9-CM    1. Atrial fibrillation, unspecified type ([address]  I48.91 427.31 AMB POC EKG ROUTINE W/ 12 LEADS, INTER & REP   2. SSS (sick sinus syndrome) (HCC)  I49.5 427.81    3. Syncope, unspecified syncope type  R55 780.2    4. Class 2 obesity due to excess calories without serious comorbidity in adult, unspecified BMI  E66.09 278.00    5. PVC (premature ventricular contraction)  I49.3 427.69      Orders Placed This Encounter    AMB POC EKG ROUTINE W/ 12 LEADS, INTER & REP     Order Specific Question:   Reason for Exam:     Answer:   routine    omeprazole (PRILOSEC) 10 mg capsule     Sig: Take 10 mg by mouth daily. Plan:   Mayte Mohr is A paced 13% for sick sinus. He is stable and compliant with med rx for hyperlipidemia. He is hypertensive today. We discussed that he will likely need med rx for that. He is seeing PCP next month. During this visit,  the patient and I had a comprehensive discussion of device management using principles of shared decision making. We reviewed device therapy, including the potential risks and benefits of device management.  These risks include death, myocardial infarction, stroke, cardiac perforation, vascular injury, infection, pacing induced cardiomyopathy, inappropriate shocks (defibrillator) and other less severe complications. The patient demonstrated a clear understanding of these issues during out discussion. Our plans, determined together after thorough consideration, are outlined else where in this note. Thank you for allowing me to participate in Hany Roberts 's care. Ramesh Hunt MD, [facility]    On this date 01/11/2022 I have spent 30 minutes reviewing previous notes, test results and face to face with the patient discussing the diagnosis and importance of compliance with the treatment plan as well as documenting on the day of the visit.

## 2022-02-18 ENCOUNTER — OFFICE VISIT (OUTPATIENT)
Dept: FAMILY MEDICINE CLINIC | Age: 52
End: 2022-02-18
Payer: COMMERCIAL

## 2022-02-18 VITALS
SYSTOLIC BLOOD PRESSURE: 128 MMHG | TEMPERATURE: 98 F | HEIGHT: 72 IN | OXYGEN SATURATION: 96 % | DIASTOLIC BLOOD PRESSURE: 71 MMHG | RESPIRATION RATE: 18 BRPM | HEART RATE: 77 BPM | WEIGHT: 302.5 LBS | BODY MASS INDEX: 40.97 KG/M2

## 2022-02-18 DIAGNOSIS — N13.8 BPH WITH OBSTRUCTION/LOWER URINARY TRACT SYMPTOMS: ICD-10-CM

## 2022-02-18 DIAGNOSIS — I49.5 SSS (SICK SINUS SYNDROME) (HCC): Primary | Chronic | ICD-10-CM

## 2022-02-18 DIAGNOSIS — E78.2 MIXED HYPERLIPIDEMIA: ICD-10-CM

## 2022-02-18 DIAGNOSIS — N40.1 BPH WITH OBSTRUCTION/LOWER URINARY TRACT SYMPTOMS: ICD-10-CM

## 2022-02-18 DIAGNOSIS — J45.20 MILD INTERMITTENT ASTHMA WITHOUT COMPLICATION: ICD-10-CM

## 2022-02-18 DIAGNOSIS — J30.9 CHRONIC ALLERGIC RHINITIS: ICD-10-CM

## 2022-02-18 DIAGNOSIS — I65.23 BILATERAL CAROTID ARTERY STENOSIS: ICD-10-CM

## 2022-02-18 DIAGNOSIS — E66.01 SEVERE OBESITY (HCC): ICD-10-CM

## 2022-02-18 DIAGNOSIS — K20.0 EOSINOPHILIC ESOPHAGITIS: ICD-10-CM

## 2022-02-18 DIAGNOSIS — R06.2 WHEEZING: ICD-10-CM

## 2022-02-18 PROBLEM — E66.09 CLASS 2 OBESITY DUE TO EXCESS CALORIES WITHOUT SERIOUS COMORBIDITY IN ADULT: Status: RESOLVED | Noted: 2018-07-19 | Resolved: 2022-02-18

## 2022-02-18 PROBLEM — T82.7XXA INFECTED PACEMAKER (HCC): Status: RESOLVED | Noted: 2021-10-03 | Resolved: 2022-02-18

## 2022-02-18 PROCEDURE — 99214 OFFICE O/P EST MOD 30 MIN: CPT | Performed by: FAMILY MEDICINE

## 2022-02-18 RX ORDER — FAMOTIDINE 40 MG/1
40 TABLET, FILM COATED ORAL
Qty: 90 TABLET | Refills: 1 | Status: SHIPPED | OUTPATIENT
Start: 2022-02-18 | End: 2022-08-15 | Stop reason: SDUPTHER

## 2022-02-18 RX ORDER — FLUTICASONE PROPIONATE 50 MCG
SPRAY, SUSPENSION (ML) NASAL
Qty: 3 EACH | Refills: 1 | Status: SHIPPED | OUTPATIENT
Start: 2022-02-18

## 2022-02-18 RX ORDER — ALBUTEROL SULFATE 90 UG/1
2 AEROSOL, METERED RESPIRATORY (INHALATION)
Qty: 3 EACH | Refills: 1 | Status: SHIPPED | OUTPATIENT
Start: 2022-02-18 | End: 2022-08-15 | Stop reason: SDUPTHER

## 2022-02-18 RX ORDER — PANTOPRAZOLE SODIUM 40 MG/1
40 TABLET, DELAYED RELEASE ORAL 2 TIMES DAILY
Qty: 180 TABLET | Refills: 1 | Status: SHIPPED | OUTPATIENT
Start: 2022-02-18 | End: 2022-08-15 | Stop reason: SDUPTHER

## 2022-02-18 RX ORDER — ATORVASTATIN CALCIUM 40 MG/1
40 TABLET, FILM COATED ORAL DAILY
Qty: 90 TABLET | Refills: 1 | Status: SHIPPED | OUTPATIENT
Start: 2022-02-18 | End: 2022-08-15 | Stop reason: SDUPTHER

## 2022-02-18 RX ORDER — MONTELUKAST SODIUM 10 MG/1
10 TABLET ORAL DAILY
Qty: 90 TABLET | Refills: 1 | Status: SHIPPED | OUTPATIENT
Start: 2022-02-18 | End: 2022-08-15 | Stop reason: SDUPTHER

## 2022-02-18 NOTE — PROGRESS NOTES
Ryland Taveras is a 54 y.o. male who presents with the following:  Chief Complaint   Patient presents with    GERD     Eosinophilic esophagitis    Cholesterol Problem    Heart Problem     Sick sinus syndrome with pacemaker    Benign Prostatic Hypertrophy     With lower urinary tract symptoms    Obesity       Patient's eosinophilic esophagitis is doing well on pantoprazole and famotidine together. He is having no heartburn on this. The patient does have mixed hyperlipidemia and is doing well on atorvastatin without muscle pain or weakness. The patient does have sick sinus syndrome and does have a pacemaker controlling his heart rate. He is having no chest pain or shortness of breath on this. The patient has benign prostatic hypertrophy with lower urinary tract symptoms which are tolerable at this point in time off of medication. The patient is severely obese. Patient also has a history of asthma which currently is doing well with just an occasional use of his montelukast and albuterol. Patient does have a sore medial aspect of his left knee where his dog which is a pretty good-sized animal ran into his leg at the knee appears to be stable it just hurts. Allergies   Allergen Reactions    Dermabond [Octyl 2-Cyanoacrylate] Atopic Dermatitis    Hornet Venom Swelling     Carries Epi pen    Adhesive Rash     Dermabond       Current Outpatient Medications   Medication Sig    albuterol (PROVENTIL HFA, VENTOLIN HFA, PROAIR HFA) 90 mcg/actuation inhaler Take 2 Puffs by inhalation every four (4) hours as needed for Wheezing.  atorvastatin (LIPITOR) 40 mg tablet Take 1 Tablet by mouth daily.  famotidine (PEPCID) 40 mg tablet Take 1 Tablet by mouth nightly.  fluticasone propionate (FLONASE) 50 mcg/actuation nasal spray 1 spray each nostril twice daily    montelukast (SINGULAIR) 10 mg tablet Take 1 Tablet by mouth daily.     pantoprazole (PROTONIX) 40 mg tablet Take 1 Tablet by mouth two (2) times a day.  omeprazole (PRILOSEC) 10 mg capsule Take 10 mg by mouth daily.  EPINEPHrine (EPIPEN) 0.3 mg/0.3 mL injection INJECT 0.3ML BY INTRAMUSCLAR ROUTE ONCE AS NEEDED FOR ALLERGIC RESPONSE FOR UP TO 1 DOSE    ketotifen (ZADITOR) 0.025 % (0.035 %) ophthalmic solution Administer 1 Drop to both eyes two (2) times daily as needed (itchy watery eyes).  guaiFENesin (Mucinex) 1,200 mg Ta12 ER tablet Take 1 Tablet by mouth two (2) times a day. (Patient taking differently: Take 1,200 mg by mouth as needed.)    pseudoephedrine (SUDAFED) 60 mg tablet Take 1 Tab by mouth every six (6) hours as needed for Congestion. No current facility-administered medications for this visit.        Past Medical History:   Diagnosis Date    Acid reflux     Acute pain of right shoulder 1/30/2018    Asthma     Atrial fibrillation ([facility] [facility] 2020    Carotid artery disease (HCC)     Class 2 obesity due to excess calories without serious comorbidity in adult 7/19/2018    Fainting spell     Hyperlipidemia     Infected pacemaker ([facility] [address] 10/3/2021    Joint pain     Muscle ache     Muscle pain     Muscle weakness     Pacemaker     Sinus problem     Urine troubles        Past Surgical History:   Procedure Laterality Date    HX AFIB ABLATION  09/18/2020    HX ENDOSCOPY  2020    HX ENDOSCOPY      HX PACEMAKER      HX ROTATOR CUFF REPAIR  04/03/2018    HX VASECTOMY  2017    OK ABDOMEN SURGERY PROC UNLISTED      bilat ing hernia repair with repeat R    OK ABDOMEN SURGERY PROC UNLISTED      umbilical hernia repair    OK CARDIAC SURG PROCEDURE UNLIST  05/2020    loop recorder insertion    OK COMPRE EP EVAL ABLTJ 3D MAPG TX SVT N/A 9/18/2020    ABLATION SVT performed by Rafael [facility]keon MD at [facility] CARDIAC CATH LAB    OK COMPRE EP EVAL ABLTJ ATR FIB PULM VEIN ISOLATION N/A 9/18/2020    ABLATION A-FIB  W COMPLETE EP STUDY performed by Rafael Mckeon MD at [facility] CARDIAC CATH LAB    OK [address] ARRHYTHMIA ADD ON N/A 9/18/2020    Ablation Svt/Vt Add On performed by Sonia Rincon MD at [facility] CARDIAC CATH LAB    SD INSERTION SUBQ CARDIAC RHYTHM MONITOR W/PRGRMG N/A 5/15/2020    LOOP RECORDER INSERT performed by Sonia Rincon MD at [facility] CARDIAC CATH LAB    SD INTRACARD ECHO, THER/DX INTERVENT N/A 9/18/2020    Intracardiac Echocardiogram performed by Sonia Rincon MD at [facility] CARDIAC CATH LAB    SD INTRACARDIAC ELECTROPHYSIOLOGIC 3D MAPPING N/A 9/18/2020    Ep 3d Mapping performed by Sonia Rincon MD at [facility] CARDIAC CATH LAB       Family History   Problem Relation Age of Onset    No Known Problems Mother     Heart Attack Father     No Known Problems Sister     No Known Problems Brother     No Known Problems Maternal Grandmother     No Known Problems Maternal Grandfather     Heart Failure Paternal Grandmother     Cancer Paternal Grandfather         esophagus    No Known Problems Other        Social History     Socioeconomic History    Marital status:      Spouse name: Nicolás Rico   Occupational History    Occupation:      Comment: [employer]   Tobacco Use    Smoking status: Never Smoker    Smokeless tobacco: Never Used   Vaping Use    Vaping Use: Never used   Substance and Sexual Activity    Alcohol use: No    Drug use: No    Sexual activity: Yes     Partners: Female   Other Topics Concern     Service No       Review of Systems   Constitutional: Negative for chills, fever, malaise/fatigue and weight loss. HENT: Negative for congestion, hearing loss, sore throat and tinnitus. Eyes: Negative for blurred vision, pain and discharge. Respiratory: Negative for cough, shortness of breath and wheezing. Cardiovascular: Negative for chest pain, palpitations, orthopnea, claudication and leg swelling. Gastrointestinal: Negative for abdominal pain, constipation and heartburn. Genitourinary: Negative for dysuria, frequency and urgency. Musculoskeletal: Positive for joint pain. Negative for falls and myalgias. Skin: Negative for itching and rash. Neurological: Negative for dizziness, tingling, tremors and headaches. Endo/Heme/Allergies: Negative for environmental allergies and polydipsia. Psychiatric/Behavioral: Negative for depression and substance abuse. The patient is not nervous/anxious. Visit Vitals  /71 (BP 1 Location: Left arm, BP Patient Position: Sitting)   Pulse 77   Temp 98 °F (36.7 °C) (Temporal)   Resp 18   Ht 6' (1.829 m)   Wt 302 lb 8 oz (137.2 kg)   SpO2 96%   BMI 41.03 kg/m²     Physical Exam  Vitals reviewed. Constitutional:       General: He is not in acute distress. Appearance: Normal appearance. He is obese. He is not ill-appearing. HENT:      Head: Normocephalic and atraumatic. Right Ear: Tympanic membrane, ear canal and external ear normal.      Left Ear: Tympanic membrane, ear canal and external ear normal.      Nose: Nose normal. No congestion or rhinorrhea. Mouth/Throat:      Mouth: Mucous membranes are moist.      Pharynx: No oropharyngeal exudate or posterior oropharyngeal erythema. Eyes:      Extraocular Movements: Extraocular movements intact. Conjunctiva/sclera: Conjunctivae normal.      Pupils: Pupils are equal, round, and reactive to light. Comments: Pupil iris and anterior chamber normal.   Neck:      Trachea: No tracheal deviation. Cardiovascular:      Rate and Rhythm: Normal rate and regular rhythm. Pulses: Normal pulses. Heart sounds: Normal heart sounds. No murmur heard. No friction rub. No gallop. Pulmonary:      Effort: Pulmonary effort is normal. No respiratory distress. Breath sounds: Normal breath sounds. No wheezing, rhonchi or rales. Chest:      Chest wall: No tenderness. Abdominal:      General: Bowel sounds are normal. There is no distension. Palpations: Abdomen is soft. There is no mass. Tenderness:  There is no abdominal tenderness. There is no guarding or rebound. Hernia: No hernia is present. Musculoskeletal:         General: No tenderness. Normal range of motion. Cervical back: Normal range of motion and neck supple. Right lower leg: No edema. Left lower leg: No edema. Lymphadenopathy:      Cervical: No cervical adenopathy. Skin:     General: Skin is warm and dry. Findings: No erythema or rash. Neurological:      General: No focal deficit present. Mental Status: He is alert and oriented to person, place, and time. Cranial Nerves: No cranial nerve deficit. Motor: No abnormal muscle tone. Deep Tendon Reflexes: Reflexes are normal and symmetric. Reflexes normal.      Comments: Cranial nerves II through XII intact sensory and motor. Deep tendon reflexes in the biceps triceps knee and ankle are normal and bilaterally symmetrical.   Psychiatric:         Mood and Affect: Mood normal.         Behavior: Behavior normal.         Thought Content: Thought content normal.         Judgment: Judgment normal.           ICD-10-CM ICD-9-CM    1. SSS (sick sinus syndrome) ([facility])  I49.5 427.81    2. Bilateral carotid artery stenosis  I65.23 433.10      433.30    3. Eosinophilic esophagitis  A15.6 530.13 famotidine (PEPCID) 40 mg tablet      pantoprazole (PROTONIX) 40 mg tablet   4. BPH with obstruction/lower urinary tract symptoms  N40.1 600.01     N13.8 599.69    5. Mild intermittent asthma without complication  R29.26 652.17    6. Severe obesity ([address]  E66.01 278.01    7. Wheezing  R06.2 786.07 albuterol (PROVENTIL HFA, VENTOLIN HFA, PROAIR HFA) 90 mcg/actuation inhaler   8. Chronic allergic rhinitis  J30.9 477.9 fluticasone propionate (FLONASE) 50 mcg/actuation nasal spray      montelukast (SINGULAIR) 10 mg tablet   9.  Mixed hyperlipidemia  E78.2 272.2 atorvastatin (LIPITOR) 40 mg tablet      CBC WITH AUTOMATED DIFF      METABOLIC PANEL, COMPREHENSIVE      LIPID PANEL      COLLECTION VENOUS BLOOD,VENIPUNCTURE       Orders Placed This Encounter    CBC WITH AUTOMATED DIFF     Standing Status:   Future     Standing Expiration Date:       METABOLIC PANEL, COMPREHENSIVE     Standing Status:   Future     Standing Expiration Date:   3/18/2022    LIPID PANEL     Standing Status:   Future     Standing Expiration Date:   3/18/2022    COLLECTION VENOUS BLOOD,VENIPUNCTURE    albuterol (PROVENTIL HFA, VENTOLIN HFA, PROAIR HFA) 90 mcg/actuation inhaler     Sig: Take 2 Puffs by inhalation every four (4) hours as needed for Wheezing. Dispense:  3 Each     Refill:  1    atorvastatin (LIPITOR) 40 mg tablet     Sig: Take 1 Tablet by mouth daily. Dispense:  90 Tablet     Refill:  1    famotidine (PEPCID) 40 mg tablet     Sig: Take 1 Tablet by mouth nightly. Dispense:  90 Tablet     Refill:  1    fluticasone propionate (FLONASE) 50 mcg/actuation nasal spray     Si spray each nostril twice daily     Dispense:  3 Each     Refill:  1    montelukast (SINGULAIR) 10 mg tablet     Sig: Take 1 Tablet by mouth daily. Dispense:  90 Tablet     Refill:  1    pantoprazole (PROTONIX) 40 mg tablet     Sig: Take 1 Tablet by mouth two (2) times a day. Dispense:  180 Tablet     Refill:  1       Follow-up and Dispositions    · Return in about 6 months (around 2022), or if symptoms worsen or fail to improve.          Chandrakant Rock MD

## 2022-02-18 NOTE — PROGRESS NOTES
1. Have you been to the ER, urgent care clinic since your last visit? Hospitalized since your last visit? Had Pacemaker placement by Dr. Mae Bradley.     2. Have you seen or consulted any other health care providers outside of the [address] [place] [address] since your last visit? Include any pap smears or colon screening.  No     Chief Complaint   Patient presents with    GERD    Cholesterol Problem     Visit Vitals  /71 (BP 1 Location: Left arm, BP Patient Position: Sitting)   Pulse 77   Temp 98 °F (36.7 °C) (Temporal)   Resp 18   Ht 6' (1.829 m)   Wt 302 lb 8 oz (137.2 kg)   SpO2 96%   BMI 41.03 kg/m²

## 2022-02-19 LAB
ALBUMIN SERPL-MCNC: 4 G/DL (ref 3.5–5)
ALBUMIN/GLOB SERPL: 1.3 {RATIO} (ref 1.1–2.2)
ALP SERPL-CCNC: 114 U/L (ref 45–117)
ALT SERPL-CCNC: 66 U/L (ref 12–78)
ANION GAP SERPL CALC-SCNC: 5 MMOL/L (ref 5–15)
AST SERPL-CCNC: 31 U/L (ref 15–37)
BASOPHILS # BLD: 0.1 K/UL (ref 0–0.1)
BASOPHILS NFR BLD: 1 % (ref 0–1)
BILIRUB SERPL-MCNC: 0.3 MG/DL (ref 0.2–1)
BUN SERPL-MCNC: 14 MG/DL (ref 6–20)
BUN/CREAT SERPL: 13 (ref 12–20)
CALCIUM SERPL-MCNC: 8.8 MG/DL (ref 8.5–10.1)
CHLORIDE SERPL-SCNC: 106 MMOL/L (ref 97–108)
CHOLEST SERPL-MCNC: 164 MG/DL
CO2 SERPL-SCNC: 26 MMOL/L (ref 21–32)
CREAT SERPL-MCNC: 1.1 MG/DL (ref 0.7–1.3)
DIFFERENTIAL METHOD BLD: ABNORMAL
EOSINOPHIL # BLD: 0.6 K/UL (ref 0–0.4)
EOSINOPHIL NFR BLD: 7 % (ref 0–7)
ERYTHROCYTE [DISTWIDTH] IN BLOOD BY AUTOMATED COUNT: 12.9 % (ref 11.5–14.5)
GLOBULIN SER CALC-MCNC: 3.1 G/DL (ref 2–4)
GLUCOSE SERPL-MCNC: 121 MG/DL (ref 65–100)
HCT VFR BLD AUTO: 48.2 % (ref 36.6–50.3)
HDLC SERPL-MCNC: 37 MG/DL
HDLC SERPL: 4.4 {RATIO} (ref 0–5)
HGB BLD-MCNC: 15.7 G/DL (ref 12.1–17)
IMM GRANULOCYTES # BLD AUTO: 0 K/UL (ref 0–0.04)
IMM GRANULOCYTES NFR BLD AUTO: 0 % (ref 0–0.5)
LDLC SERPL CALC-MCNC: ABNORMAL MG/DL (ref 0–100)
LDLC SERPL DIRECT ASSAY-MCNC: 89 MG/DL (ref 0–100)
LYMPHOCYTES # BLD: 2.9 K/UL (ref 0.8–3.5)
LYMPHOCYTES NFR BLD: 31 % (ref 12–49)
MCH RBC QN AUTO: 28.7 PG (ref 26–34)
MCHC RBC AUTO-ENTMCNC: 32.6 G/DL (ref 30–36.5)
MCV RBC AUTO: 88.1 FL (ref 80–99)
MONOCYTES # BLD: 0.5 K/UL (ref 0–1)
MONOCYTES NFR BLD: 6 % (ref 5–13)
NEUTS SEG # BLD: 5.1 K/UL (ref 1.8–8)
NEUTS SEG NFR BLD: 55 % (ref 32–75)
NRBC # BLD: 0 K/UL (ref 0–0.01)
NRBC BLD-RTO: 0 PER 100 WBC
PLATELET # BLD AUTO: 309 K/UL (ref 150–400)
PMV BLD AUTO: 10.2 FL (ref 8.9–12.9)
POTASSIUM SERPL-SCNC: 4.8 MMOL/L (ref 3.5–5.1)
PROT SERPL-MCNC: 7.1 G/DL (ref 6.4–8.2)
RBC # BLD AUTO: 5.47 M/UL (ref 4.1–5.7)
SODIUM SERPL-SCNC: 137 MMOL/L (ref 136–145)
TRIGL SERPL-MCNC: 427 MG/DL (ref ?–150)
VLDLC SERPL CALC-MCNC: ABNORMAL MG/DL
WBC # BLD AUTO: 9.2 K/UL (ref 4.1–11.1)

## 2022-02-22 RX ORDER — FENOFIBRATE 145 MG/1
145 TABLET, COATED ORAL DAILY
Qty: 90 TABLET | Refills: 1 | Status: SHIPPED | OUTPATIENT
Start: 2022-02-22 | End: 2022-08-15 | Stop reason: SDUPTHER

## 2022-03-18 PROBLEM — R14.0 BLOATING: Status: ACTIVE | Noted: 2020-11-02

## 2022-03-18 PROBLEM — E66.01 SEVERE OBESITY (HCC): Status: ACTIVE | Noted: 2019-02-04

## 2022-03-18 PROBLEM — I65.23 BILATERAL CAROTID ARTERY STENOSIS: Status: ACTIVE | Noted: 2019-11-01

## 2022-03-18 PROBLEM — I49.5 SSS (SICK SINUS SYNDROME) (HCC): Status: ACTIVE | Noted: 2021-09-20

## 2022-03-18 PROBLEM — J45.20 MILD INTERMITTENT ASTHMA WITHOUT COMPLICATION: Status: ACTIVE | Noted: 2021-02-12

## 2022-03-19 PROBLEM — N40.1 BPH WITH OBSTRUCTION/LOWER URINARY TRACT SYMPTOMS: Status: ACTIVE | Noted: 2018-01-30

## 2022-03-19 PROBLEM — N13.8 BPH WITH OBSTRUCTION/LOWER URINARY TRACT SYMPTOMS: Status: ACTIVE | Noted: 2018-01-30

## 2022-03-19 PROBLEM — K21.9 GASTROESOPHAGEAL REFLUX DISEASE: Status: ACTIVE | Noted: 2019-09-09

## 2022-03-19 PROBLEM — R55 SYNCOPE AND COLLAPSE: Status: ACTIVE | Noted: 2019-01-14

## 2022-03-19 PROBLEM — M54.12 CERVICAL RADICULOPATHY AT C7: Status: ACTIVE | Noted: 2018-01-30

## 2022-03-19 PROBLEM — Z91.038 HYMENOPTERA ALLERGY: Status: ACTIVE | Noted: 2021-08-17

## 2022-03-19 PROBLEM — M25.552 LEFT HIP PAIN: Status: ACTIVE | Noted: 2021-02-12

## 2022-03-20 PROBLEM — K20.0 EOSINOPHILIC ESOPHAGITIS: Status: ACTIVE | Noted: 2020-03-18

## 2022-04-18 ENCOUNTER — OFFICE VISIT (OUTPATIENT)
Dept: CARDIOLOGY CLINIC | Age: 52
End: 2022-04-18
Payer: COMMERCIAL

## 2022-04-18 DIAGNOSIS — Z95.0 CARDIAC PACEMAKER IN SITU: Primary | ICD-10-CM

## 2022-04-18 DIAGNOSIS — I49.5 SSS (SICK SINUS SYNDROME) (HCC): ICD-10-CM

## 2022-04-18 DIAGNOSIS — I48.91 ATRIAL FIBRILLATION, UNSPECIFIED TYPE (HCC): ICD-10-CM

## 2022-04-18 PROCEDURE — 93294 REM INTERROG EVL PM/LDLS PM: CPT | Performed by: INTERNAL MEDICINE

## 2022-04-18 PROCEDURE — 93296 REM INTERROG EVL PM/IDS: CPT | Performed by: INTERNAL MEDICINE

## 2022-08-15 ENCOUNTER — OFFICE VISIT (OUTPATIENT)
Dept: FAMILY MEDICINE CLINIC | Age: 52
End: 2022-08-15
Payer: COMMERCIAL

## 2022-08-15 VITALS
DIASTOLIC BLOOD PRESSURE: 77 MMHG | SYSTOLIC BLOOD PRESSURE: 130 MMHG | BODY MASS INDEX: 40.38 KG/M2 | OXYGEN SATURATION: 98 % | TEMPERATURE: 98.2 F | HEART RATE: 78 BPM | RESPIRATION RATE: 18 BRPM | HEIGHT: 72 IN | WEIGHT: 298.13 LBS

## 2022-08-15 DIAGNOSIS — E78.1 HYPERTRIGLYCERIDEMIA: ICD-10-CM

## 2022-08-15 DIAGNOSIS — J30.9 CHRONIC ALLERGIC RHINITIS: ICD-10-CM

## 2022-08-15 DIAGNOSIS — H65.02 NON-RECURRENT ACUTE SEROUS OTITIS MEDIA OF LEFT EAR: ICD-10-CM

## 2022-08-15 DIAGNOSIS — E78.2 MIXED HYPERLIPIDEMIA: ICD-10-CM

## 2022-08-15 DIAGNOSIS — R73.9 ELEVATED BLOOD SUGAR: ICD-10-CM

## 2022-08-15 DIAGNOSIS — I49.5 SSS (SICK SINUS SYNDROME) (HCC): Primary | ICD-10-CM

## 2022-08-15 DIAGNOSIS — R06.2 WHEEZING: ICD-10-CM

## 2022-08-15 DIAGNOSIS — K20.0 EOSINOPHILIC ESOPHAGITIS: ICD-10-CM

## 2022-08-15 PROCEDURE — 36415 COLL VENOUS BLD VENIPUNCTURE: CPT | Performed by: NURSE PRACTITIONER

## 2022-08-15 PROCEDURE — 99214 OFFICE O/P EST MOD 30 MIN: CPT | Performed by: NURSE PRACTITIONER

## 2022-08-15 RX ORDER — FENOFIBRATE 145 MG/1
145 TABLET, COATED ORAL DAILY
Qty: 90 TABLET | Refills: 1 | Status: SHIPPED | OUTPATIENT
Start: 2022-08-15

## 2022-08-15 RX ORDER — ATORVASTATIN CALCIUM 40 MG/1
40 TABLET, FILM COATED ORAL DAILY
Qty: 90 TABLET | Refills: 1 | Status: SHIPPED | OUTPATIENT
Start: 2022-08-15

## 2022-08-15 RX ORDER — MONTELUKAST SODIUM 10 MG/1
10 TABLET ORAL DAILY
Qty: 90 TABLET | Refills: 1 | Status: SHIPPED | OUTPATIENT
Start: 2022-08-15

## 2022-08-15 RX ORDER — EPINEPHRINE 0.3 MG/.3ML
INJECTION SUBCUTANEOUS
Qty: 1 EACH | Refills: 0 | Status: SHIPPED | OUTPATIENT
Start: 2022-08-15

## 2022-08-15 RX ORDER — PREDNISONE 10 MG/1
10 TABLET ORAL SEE ADMIN INSTRUCTIONS
Qty: 21 TABLET | Refills: 0 | Status: SHIPPED | OUTPATIENT
Start: 2022-08-15

## 2022-08-15 RX ORDER — ALBUTEROL SULFATE 90 UG/1
2 AEROSOL, METERED RESPIRATORY (INHALATION)
Qty: 3 EACH | Refills: 1 | Status: SHIPPED | OUTPATIENT
Start: 2022-08-15

## 2022-08-15 RX ORDER — PANTOPRAZOLE SODIUM 40 MG/1
40 TABLET, DELAYED RELEASE ORAL 2 TIMES DAILY
Qty: 180 TABLET | Refills: 1 | Status: SHIPPED | OUTPATIENT
Start: 2022-08-15

## 2022-08-15 RX ORDER — FAMOTIDINE 40 MG/1
40 TABLET, FILM COATED ORAL
Qty: 90 TABLET | Refills: 1 | Status: SHIPPED | OUTPATIENT
Start: 2022-08-15

## 2022-08-15 NOTE — PROGRESS NOTES
Labs drawn in LEFT arm per Medina's orders. Pt tolerated well. 1. \"Have you been to the ER, urgent care clinic since your last visit? Hospitalized since your last visit? \" Yes When: ER 7-11-22    2. \"Have you seen or consulted any other health care providers outside of the [address] [place] [address] since your last visit? \" Yes When: 5-20-22 ortho      3. For patients aged 39-70: Has the patient had a colonoscopy / FIT/ Cologuard? Yes - no Care Gap present      If the patient is female:    4. For patients aged 41-77: Has the patient had a mammogram within the past 2 years? NA - based on age or sex      11. For patients aged 21-65: Has the patient had a pap smear?  NA - based on age or sex

## 2022-08-15 NOTE — PROGRESS NOTES
Filemon Butt is a 54 y.o. male who presents today with c/o   Chief Complaint   Patient presents with    Ear Pain     left           Assessment/ Plan:         ICD-10-CM ICD-9-CM    1. SSS (sick sinus syndrome) ([facility])  I49.5 427.81       2. Mixed hyperlipidemia  E78.2 272.2 atorvastatin (LIPITOR) 40 mg tablet      COLLECTION VENOUS BLOOD,VENIPUNCTURE      3. Hypertriglyceridemia  E78.1 272.1 fenofibrate nanocrystallized (TRICOR) 145 mg tablet      LIPID PANEL      LIPID PANEL      4. Chronic allergic rhinitis  J30.9 477.9 montelukast (SINGULAIR) 10 mg tablet      5. Eosinophilic esophagitis  J54.2 530.13 pantoprazole (PROTONIX) 40 mg tablet      famotidine (PEPCID) 40 mg tablet      6. Wheezing  R06.2 786.07 albuterol (PROVENTIL HFA, VENTOLIN HFA, PROAIR HFA) 90 mcg/actuation inhaler      7. Elevated blood sugar  R73.9 790.29 HEMOGLOBIN A1C WITH EAG      CBC WITH AUTOMATED DIFF      METABOLIC PANEL, COMPREHENSIVE      METABOLIC PANEL, COMPREHENSIVE      CBC WITH AUTOMATED DIFF      HEMOGLOBIN A1C WITH EAG      8. Non-recurrent acute serous otitis media of left ear  H65.02 381.01 predniSONE (STERAPRED DS) 10 mg dose pack          Orders Placed This Encounter    COLLECTION VENOUS BLOOD,VENIPUNCTURE    HEMOGLOBIN A1C WITH EAG     Standing Status:   Future     Number of Occurrences:   1     Standing Expiration Date:   8/15/2023    CBC WITH AUTOMATED DIFF     Standing Status:   Future     Number of Occurrences:   1     Standing Expiration Date:   1/59/6223    METABOLIC PANEL, COMPREHENSIVE     Standing Status:   Future     Number of Occurrences:   1     Standing Expiration Date:   8/15/2023    LIPID PANEL     Standing Status:   Future     Number of Occurrences:   1     Standing Expiration Date:   8/15/2023    montelukast (SINGULAIR) 10 mg tablet     Sig: Take 1 Tablet by mouth in the morning.      Dispense:  90 Tablet     Refill:  1    pantoprazole (PROTONIX) 40 mg tablet     Sig: Take 1 Tablet by mouth two (2) times a day. Dispense:  180 Tablet     Refill:  1    famotidine (PEPCID) 40 mg tablet     Sig: Take 1 Tablet by mouth nightly. Dispense:  90 Tablet     Refill:  1    atorvastatin (LIPITOR) 40 mg tablet     Sig: Take 1 Tablet by mouth in the morning. Dispense:  90 Tablet     Refill:  1    albuterol (PROVENTIL HFA, VENTOLIN HFA, PROAIR HFA) 90 mcg/actuation inhaler     Sig: Take 2 Puffs by inhalation every four (4) hours as needed for Wheezing. Dispense:  3 Each     Refill:  1    EPINEPHrine (EPIPEN) 0.3 mg/0.3 mL injection     Sig: INJECT 0.3ML BY INTRAMUSCLAR ROUTE ONCE AS NEEDED FOR ALLERGIC RESPONSE FOR UP TO 1 DOSE     Dispense:  1 Each     Refill:  0    fenofibrate nanocrystallized (TRICOR) 145 mg tablet     Sig: Take 1 Tablet by mouth in the morning. Dispense:  90 Tablet     Refill:  1    predniSONE (STERAPRED DS) 10 mg dose pack     Sig: Take 1 Tablet by mouth See Admin Instructions. See administration instruction per 10mg dose pack     Dispense:  21 Tablet     Refill:  0       Follow-up and Dispositions    Return in about 6 months (around 2/15/2023). Subjective: Hugh Cortes is a 54 y.o. male here with the following complaints listed below. Drives trucks for a living. New issue:  Left ear pain: Reports fullness in the left ear for a couple months. States ibuprofen relieves the pain. Denies fevers, denies ear discharge. HPI:  HTN: B/P stable in the office on current medications. Has hx of sick sinus syndrome with a pacemaker. Has follow up tomorrow with Dr. Saray Gaines. He does mention sweating  a lot recently along with intermittent chest pain. None in the office today. Plans to discuss this with Dr. Saray Gaines tomorrow since Dr. Kymberly García office called him and told him they would like to discuss recent cardiac findings. Esophagitis:Taking protonix and pepcid. [place] well. Allergic rhinitis: Taking singulair. [place] well. Requesting a refill today. HLD.  Taking atorvastatin and fenofibrate. Wheezing: Uses albuterol PRN. Hypertriglycerides: Taking fenofibrate. [place] well. Lab Results   Component Value Date/Time    Cholesterol, total 164 02/18/2022 06:27 PM    HDL Cholesterol 37 02/18/2022 06:27 PM    LDL,Direct 89 02/18/2022 06:27 PM    LDL, calculated Not calculated due to elevated triglyceride level 02/18/2022 06:27 PM    VLDL, calculated  02/18/2022 06:27 PM     Calculation not valid with this patient's other Lipid values.     Triglyceride 427 (H) 02/18/2022 06:27 PM    CHOL/HDL Ratio 4.4 02/18/2022 06:27 PM       Patient Active Problem List   Diagnosis Code    BPH with obstruction/lower urinary tract symptoms N40.1, N13.8    Cervical radiculopathy at C7 M54.12    Syncope and collapse R55    Severe obesity ([facility]) E66.01    Gastroesophageal reflux disease K21.9    Bilateral carotid artery stenosis P86.49    Eosinophilic esophagitis Y35.6    Bloating R14.0    Mild intermittent asthma without complication Y21.07    Left hip pain M25.552    Hymenoptera allergy Z91.038    SSS (sick sinus syndrome) ([facility]) I49.5       Past Medical History:   Diagnosis Date    Acid reflux     Acute pain of right shoulder 1/30/2018    Asthma     Atrial fibrillation ([facility] [address] 2020    Carotid artery disease ([facility])     Class 2 obesity due to excess calories without serious comorbidity in adult 7/19/2018    Fainting spell     Hyperlipidemia     Infected pacemaker ([facility] [address] 10/3/2021    Joint pain     Muscle ache     Muscle pain     Muscle weakness     Pacemaker     Sinus problem     Urine troubles          Current Outpatient Medications:     montelukast (SINGULAIR) 10 mg tablet, Take 1 Tablet by mouth in the morning., Disp: 90 Tablet, Rfl: 1    pantoprazole (PROTONIX) 40 mg tablet, Take 1 Tablet by mouth two (2) times a day., Disp: 180 Tablet, Rfl: 1    famotidine (PEPCID) 40 mg tablet, Take 1 Tablet by mouth nightly., Disp: 90 Tablet, Rfl: 1    atorvastatin (LIPITOR) 40 mg tablet, Take 1 Tablet by mouth in the morning., Disp: 90 Tablet, Rfl: 1    albuterol (PROVENTIL HFA, VENTOLIN HFA, PROAIR HFA) 90 mcg/actuation inhaler, Take 2 Puffs by inhalation every four (4) hours as needed for Wheezing., Disp: 3 Each, Rfl: 1    EPINEPHrine (EPIPEN) 0.3 mg/0.3 mL injection, INJECT 0.3ML BY INTRAMUSCLAR ROUTE ONCE AS NEEDED FOR ALLERGIC RESPONSE FOR UP TO 1 DOSE, Disp: 1 Each, Rfl: 0    fenofibrate nanocrystallized (TRICOR) 145 mg tablet, Take 1 Tablet by mouth in the morning., Disp: 90 Tablet, Rfl: 1    predniSONE (STERAPRED DS) 10 mg dose pack, Take 1 Tablet by mouth See Admin Instructions. See administration instruction per 10mg dose pack, Disp: 21 Tablet, Rfl: 0    fluticasone propionate (FLONASE) 50 mcg/actuation nasal spray, 1 spray each nostril twice daily, Disp: 3 Each, Rfl: 1    ketotifen (ZADITOR) 0.025 % (0.035 %) ophthalmic solution, Administer 1 Drop to both eyes two (2) times daily as needed (itchy watery eyes). , Disp: 10 mL, Rfl: 3    guaiFENesin (Mucinex) 1,200 mg Ta12 ER tablet, Take 1 Tablet by mouth two (2) times a day. (Patient taking differently: Take 1,200 mg by mouth as needed.), Disp: 20 Tablet, Rfl: 1    pseudoephedrine (SUDAFED) 60 mg tablet, Take 1 Tab by mouth every six (6) hours as needed for Congestion. , Disp: 40 Tab, Rfl: 1    Allergies   Allergen Reactions    Dermabond [Octyl 2-Cyanoacrylate] Atopic Dermatitis    Hornet Venom Swelling     Carries Epi pen    Adhesive Rash     Dermabond       Past Surgical History:   Procedure Laterality Date    HX AFIB ABLATION  09/18/2020    HX ENDOSCOPY  2020    HX ENDOSCOPY      HX PACEMAKER      HX ROTATOR CUFF REPAIR  04/03/2018    HX VASECTOMY  2017    CA ABDOMEN SURGERY PROC UNLISTED      bilat ing hernia repair with repeat R    CA ABDOMEN SURGERY PROC UNLISTED      umbilical hernia repair    CA CARDIAC SURG PROCEDURE UNLIST  05/2020    loop recorder insertion    CA COMPRE EP EVAL ABLTJ 3D MAPG TX SVT N/A 9/18/2020    ABLATION SVT performed by Camilla Isabel MD at [facility] CARDIAC CATH LAB    MT COMPRE EP EVAL ABLTJ ATR FIB PULM VEIN ISOLATION N/A 9/18/2020    ABLATION A-FIB  W COMPLETE EP STUDY performed by Camilla Isabel MD at [facility] CARDIAC CATH LAB    MT ICAR CATHETER ABLATION ARRHYTHMIA ADD ON N/A 9/18/2020    Ablation Svt/Vt Add On performed by Camilla Isabel MD at [facility] CARDIAC CATH LAB    MT INSERTION SUBQ CARDIAC RHYTHM MONITOR W/PRGRMG N/A 5/15/2020    LOOP RECORDER INSERT performed by Camilla Isabel MD at [facility] CARDIAC CATH LAB    MT INTRACARD ECHO, THER/DX INTERVENT N/A 9/18/2020    Intracardiac Echocardiogram performed by Camilla Isabel MD at [facility] CARDIAC CATH LAB    MT INTRACARDIAC ELECTROPHYSIOLOGIC 3D MAPPING N/A 9/18/2020    Ep 3d Mapping performed by Camilla Isabel MD at [facility] CARDIAC CATH LAB       Social History     Tobacco Use   Smoking Status Never   Smokeless Tobacco Never       Social History     Socioeconomic History    Marital status:      Spouse name: Audie Hdz   Occupational History    Occupation:      Comment: [employer]   Tobacco Use    Smoking status: Never    Smokeless tobacco: Never   Vaping Use    Vaping Use: Never used   Substance and Sexual Activity    Alcohol use: No    Drug use: No    Sexual activity: Yes     Partners: Female   Other Topics Concern     Service No       Family History   Problem Relation Age of Onset    No Known Problems Mother     Heart Attack Father     No Known Problems Sister     No Known Problems Brother     No Known Problems Maternal Grandmother     No Known Problems Maternal Grandfather     Heart Failure Paternal Grandmother     Cancer Paternal Grandfather         esophagus    No Known Problems Other        ROS:  Review of Systems   Constitutional:  Positive for diaphoresis. Negative for chills, fever, malaise/fatigue and weight loss. HENT:  Positive for ear pain.  Negative for hearing loss and tinnitus. Eyes:  Negative for blurred vision and double vision. Respiratory:  Negative for cough. Cardiovascular:  Positive for chest pain. Negative for leg swelling. Gastrointestinal:  Negative for heartburn. Genitourinary:  Negative for dysuria. Musculoskeletal:  Negative for myalgias. Skin:  Negative for itching and rash. Neurological:  Negative for dizziness and headaches. Objective:     Visit Vitals  /77 (BP 1 Location: Left upper arm)   Pulse 78   Temp 98.2 °F (36.8 °C) (Temporal)   Resp 18   Ht 6' (1.829 m)   Wt 298 lb 2 oz (135.2 kg)   SpO2 98%   BMI 40.43 kg/m²     Body mass index is 40.43 kg/m². Physical Exam  Vitals reviewed. Constitutional:       General: He is not in acute distress. Appearance: He is not ill-appearing or toxic-appearing. HENT:      Head: Normocephalic. Right Ear: External ear normal.      Left Ear: External ear normal.      Ears:        Comments: White scar tissue noted. No erythema     Nose: Nose normal.      Mouth/Throat:      Mouth: Mucous membranes are moist.   Eyes:      Pupils: Pupils are equal, round, and reactive to light. Cardiovascular:      Rate and Rhythm: Normal rate. Pulses: Normal pulses. Pulmonary:      Effort: Pulmonary effort is normal.   Abdominal:      General: Abdomen is flat. Musculoskeletal:         General: Normal range of motion. Cervical back: Normal range of motion. Skin:     General: Skin is warm. Neurological:      Mental Status: He is alert and oriented to person, place, and time.    Psychiatric:         Mood and Affect: Mood normal.         Behavior: Behavior normal.       Results for orders placed or performed in visit on 02/18/22   LIPID PANEL   Result Value Ref Range    Cholesterol, total 164 <200 MG/DL    Triglyceride 427 (H) <150 MG/DL    HDL Cholesterol 37 MG/DL    LDL, calculated Not calculated due to elevated triglyceride level 0 - 100 MG/DL    VLDL, calculated  MG/DL Calculation not valid with this patient's other Lipid values. CHOL/HDL Ratio 4.4 0.0 - 5.0     METABOLIC PANEL, COMPREHENSIVE   Result Value Ref Range    Sodium 137 136 - 145 mmol/L    Potassium 4.8 3.5 - 5.1 mmol/L    Chloride 106 97 - 108 mmol/L    CO2 26 21 - 32 mmol/L    Anion gap 5 5 - 15 mmol/L    Glucose 121 (H) 65 - 100 mg/dL    BUN 14 6 - 20 MG/DL    Creatinine 1.10 0.70 - 1.30 MG/DL    BUN/Creatinine ratio 13 12 - 20      GFR est AA >60 >60 ml/min/1.73m2    GFR est non-AA >60 >60 ml/min/1.73m2    Calcium 8.8 8.5 - 10.1 MG/DL    Bilirubin, total 0.3 0.2 - 1.0 MG/DL    ALT (SGPT) 66 12 - 78 U/L    AST (SGOT) 31 15 - 37 U/L    Alk. phosphatase 114 45 - 117 U/L    Protein, total 7.1 6.4 - 8.2 g/dL    Albumin 4.0 3.5 - 5.0 g/dL    Globulin 3.1 2.0 - 4.0 g/dL    A-G Ratio 1.3 1.1 - 2.2     CBC WITH AUTOMATED DIFF   Result Value Ref Range    WBC 9.2 4.1 - 11.1 K/uL    RBC 5.47 4.10 - 5.70 M/uL    HGB 15.7 12.1 - 17.0 g/dL    HCT 48.2 36.6 - 50.3 %    MCV 88.1 80.0 - 99.0 FL    MCH 28.7 26.0 - 34.0 PG    MCHC 32.6 30.0 - 36.5 g/dL    RDW 12.9 11.5 - 14.5 %    PLATELET 352 298 - 031 K/uL    MPV 10.2 8.9 - 12.9 FL    NRBC 0.0 0  WBC    ABSOLUTE NRBC 0.00 0.00 - 0.01 K/uL    NEUTROPHILS 55 32 - 75 %    LYMPHOCYTES 31 12 - 49 %    MONOCYTES 6 5 - 13 %    EOSINOPHILS 7 0 - 7 %    BASOPHILS 1 0 - 1 %    IMMATURE GRANULOCYTES 0 0.0 - 0.5 %    ABS. NEUTROPHILS 5.1 1.8 - 8.0 K/UL    ABS. LYMPHOCYTES 2.9 0.8 - 3.5 K/UL    ABS. MONOCYTES 0.5 0.0 - 1.0 K/UL    ABS. EOSINOPHILS 0.6 (H) 0.0 - 0.4 K/UL    ABS. BASOPHILS 0.1 0.0 - 0.1 K/UL    ABS. IMM. GRANS. 0.0 0.00 - 0.04 K/UL    DF AUTOMATED     LDL, DIRECT   Result Value Ref Range    LDL,Direct 89 0 - 100 mg/dl       No results found for this visit on 08/15/22. Verbal and written instructions (see AVS) provided. Patient expresses understanding of diagnosis and treatment plan. Follow-up and Dispositions    Return in about 6 months (around 2/15/2023). Patient has been advised to contact practice or seek care if condition persists or worsens.      Yannimer Zahraa, NP

## 2022-08-16 LAB
ALBUMIN SERPL-MCNC: 3.7 G/DL (ref 3.5–5)
ALBUMIN/GLOB SERPL: 1.1 {RATIO} (ref 1.1–2.2)
ALP SERPL-CCNC: 86 U/L (ref 45–117)
ALT SERPL-CCNC: 45 U/L (ref 12–78)
ANION GAP SERPL CALC-SCNC: 8 MMOL/L (ref 5–15)
AST SERPL-CCNC: 25 U/L (ref 15–37)
BASOPHILS # BLD: 0 K/UL (ref 0–0.1)
BASOPHILS NFR BLD: 0 % (ref 0–1)
BILIRUB SERPL-MCNC: 0.3 MG/DL (ref 0.2–1)
BUN SERPL-MCNC: 12 MG/DL (ref 6–20)
BUN/CREAT SERPL: 12 (ref 12–20)
CALCIUM SERPL-MCNC: 9.1 MG/DL (ref 8.5–10.1)
CHLORIDE SERPL-SCNC: 107 MMOL/L (ref 97–108)
CHOLEST SERPL-MCNC: 231 MG/DL
CO2 SERPL-SCNC: 26 MMOL/L (ref 21–32)
CREAT SERPL-MCNC: 1.04 MG/DL (ref 0.7–1.3)
DIFFERENTIAL METHOD BLD: ABNORMAL
EOSINOPHIL # BLD: 0.6 K/UL (ref 0–0.4)
EOSINOPHIL NFR BLD: 6 % (ref 0–7)
ERYTHROCYTE [DISTWIDTH] IN BLOOD BY AUTOMATED COUNT: 12.9 % (ref 11.5–14.5)
EST. AVERAGE GLUCOSE BLD GHB EST-MCNC: 117 MG/DL
GLOBULIN SER CALC-MCNC: 3.4 G/DL (ref 2–4)
GLUCOSE SERPL-MCNC: 114 MG/DL (ref 65–100)
HBA1C MFR BLD: 5.7 % (ref 4–5.6)
HCT VFR BLD AUTO: 47 % (ref 36.6–50.3)
HDLC SERPL-MCNC: 45 MG/DL
HDLC SERPL: 5.1 {RATIO} (ref 0–5)
HGB BLD-MCNC: 15.8 G/DL (ref 12.1–17)
IMM GRANULOCYTES # BLD AUTO: 0.1 K/UL (ref 0–0.04)
IMM GRANULOCYTES NFR BLD AUTO: 1 % (ref 0–0.5)
LDLC SERPL CALC-MCNC: 112.4 MG/DL (ref 0–100)
LYMPHOCYTES # BLD: 2.9 K/UL (ref 0.8–3.5)
LYMPHOCYTES NFR BLD: 29 % (ref 12–49)
MCH RBC QN AUTO: 29.4 PG (ref 26–34)
MCHC RBC AUTO-ENTMCNC: 33.6 G/DL (ref 30–36.5)
MCV RBC AUTO: 87.4 FL (ref 80–99)
MONOCYTES # BLD: 0.6 K/UL (ref 0–1)
MONOCYTES NFR BLD: 6 % (ref 5–13)
NEUTS SEG # BLD: 5.7 K/UL (ref 1.8–8)
NEUTS SEG NFR BLD: 58 % (ref 32–75)
NRBC # BLD: 0 K/UL (ref 0–0.01)
NRBC BLD-RTO: 0 PER 100 WBC
PLATELET # BLD AUTO: 220 K/UL (ref 150–400)
PMV BLD AUTO: 11.1 FL (ref 8.9–12.9)
POTASSIUM SERPL-SCNC: 4.1 MMOL/L (ref 3.5–5.1)
PROT SERPL-MCNC: 7.1 G/DL (ref 6.4–8.2)
RBC # BLD AUTO: 5.38 M/UL (ref 4.1–5.7)
SODIUM SERPL-SCNC: 141 MMOL/L (ref 136–145)
TRIGL SERPL-MCNC: 368 MG/DL (ref ?–150)
VLDLC SERPL CALC-MCNC: 73.6 MG/DL
WBC # BLD AUTO: 9.8 K/UL (ref 4.1–11.1)

## 2022-08-16 NOTE — PROGRESS NOTES
Triglycerides are improving. A1C is a little higher, but still within goal. Continue to work on a low carb diet. Kidney and liver function look good. No signs of anemia or infection.

## 2022-10-11 ENCOUNTER — TRANSCRIBE ORDER (OUTPATIENT)
Dept: SCHEDULING | Age: 52
End: 2022-10-11

## 2022-10-11 DIAGNOSIS — I47.20 VENTRICULAR TACHYCARDIA: Primary | ICD-10-CM

## 2022-12-08 ENCOUNTER — OFFICE VISIT (OUTPATIENT)
Dept: FAMILY MEDICINE CLINIC | Age: 52
End: 2022-12-08
Payer: COMMERCIAL

## 2022-12-08 VITALS
WEIGHT: 298 LBS | OXYGEN SATURATION: 98 % | RESPIRATION RATE: 18 BRPM | HEART RATE: 71 BPM | TEMPERATURE: 98 F | BODY MASS INDEX: 39.49 KG/M2 | HEIGHT: 73 IN | DIASTOLIC BLOOD PRESSURE: 71 MMHG | SYSTOLIC BLOOD PRESSURE: 125 MMHG

## 2022-12-08 DIAGNOSIS — L21.9 SEBORRHEIC DERMATITIS: Primary | ICD-10-CM

## 2022-12-08 PROCEDURE — 99213 OFFICE O/P EST LOW 20 MIN: CPT | Performed by: FAMILY MEDICINE

## 2022-12-08 RX ORDER — CLOTRIMAZOLE AND BETAMETHASONE DIPROPIONATE 10; .64 MG/G; MG/G
CREAM TOPICAL 2 TIMES DAILY
Qty: 15 G | Refills: 1 | Status: SHIPPED | OUTPATIENT
Start: 2022-12-08

## 2022-12-08 RX ORDER — METOPROLOL SUCCINATE 25 MG/1
TABLET, EXTENDED RELEASE ORAL
COMMUNITY

## 2022-12-08 NOTE — PROGRESS NOTES
1. \"Have you been to the ER, urgent care clinic since your last visit? Hospitalized since your last visit? \" No    2. \"Have you seen or consulted any other health care providers outside of the [address] [place] [address] since your last visit? \" No     3. For patients aged 39-70: Has the patient had a colonoscopy / FIT/ Cologuard? Yes - no Care Gap present      If the patient is female:    4. For patients aged 41-77: Has the patient had a mammogram within the past 2 years? NA - based on age or sex      11. For patients aged 21-65: Has the patient had a pap smear?  NA - based on age or sex

## 2022-12-08 NOTE — PROGRESS NOTES
Saji Thomas is a 50 y.o. male who presents with the following:  Chief Complaint   Patient presents with    Skin Problem       Patient has developed a rash underneath his CPAP mask has been gradually getting worse with flaky skin and itching. Allergies   Allergen Reactions    Dermabond [2-Octyl Cyanoacrylate] Atopic Dermatitis    Hornet Venom Swelling     Carries Epi pen    Adhesive Rash     Dermabond       Current Outpatient Medications   Medication Sig    metoprolol succinate (TOPROL-XL) 25 mg XL tablet metoprolol succinate ER 25 mg tablet,extended release 24 hr    montelukast (SINGULAIR) 10 mg tablet Take 1 Tablet by mouth in the morning. pantoprazole (PROTONIX) 40 mg tablet Take 1 Tablet by mouth two (2) times a day. famotidine (PEPCID) 40 mg tablet Take 1 Tablet by mouth nightly. atorvastatin (LIPITOR) 40 mg tablet Take 1 Tablet by mouth in the morning. albuterol (PROVENTIL HFA, VENTOLIN HFA, PROAIR HFA) 90 mcg/actuation inhaler Take 2 Puffs by inhalation every four (4) hours as needed for Wheezing. EPINEPHrine (EPIPEN) 0.3 mg/0.3 mL injection INJECT 0.3ML BY INTRAMUSCLAR ROUTE ONCE AS NEEDED FOR ALLERGIC RESPONSE FOR UP TO 1 DOSE    fenofibrate nanocrystallized (TRICOR) 145 mg tablet Take 1 Tablet by mouth in the morning. predniSONE (STERAPRED DS) 10 mg dose pack Take 1 Tablet by mouth See Admin Instructions. See administration instruction per 10mg dose pack    fluticasone propionate (FLONASE) 50 mcg/actuation nasal spray 1 spray each nostril twice daily    ketotifen (ZADITOR) 0.025 % (0.035 %) ophthalmic solution Administer 1 Drop to both eyes two (2) times daily as needed (itchy watery eyes). guaiFENesin (Mucinex) 1,200 mg Ta12 ER tablet Take 1 Tablet by mouth two (2) times a day. (Patient taking differently: Take 1,200 mg by mouth as needed.)    pseudoephedrine (SUDAFED) 60 mg tablet Take 1 Tab by mouth every six (6) hours as needed for Congestion.      No current facility-administered medications for this visit.        Past Medical History:   Diagnosis Date    Acid reflux     Acute pain of right shoulder 1/30/2018    Asthma     Atrial fibrillation ([facility] [address] 2020    Carotid artery disease ([facility])     Class 2 obesity due to excess calories without serious comorbidity in adult 7/19/2018    Fainting spell     Hyperlipidemia     Infected pacemaker ([facility] [address] 10/3/2021    Joint pain     Muscle ache     Muscle pain     Muscle weakness     Pacemaker     Sinus problem     Urine troubles        Past Surgical History:   Procedure Laterality Date    HX AFIB ABLATION  09/18/2020    HX ENDOSCOPY  2020    HX ENDOSCOPY      HX PACEMAKER      HX ROTATOR CUFF REPAIR  04/03/2018    HX VASECTOMY  2017    ND ABDOMEN SURGERY PROC UNLISTED      bilat ing hernia repair with repeat R    ND ABDOMEN SURGERY PROC UNLISTED      umbilical hernia repair    ND CARDIAC SURG PROCEDURE UNLIST  05/2020    loop recorder insertion    ND COMPRE EP EVAL ABLTJ 3D MAPG TX SVT N/A 9/18/2020    ABLATION SVT performed by Yehuda Godinez MD at [address] FIB PULM VEIN ISOLATION N/A 9/18/2020    ABLATION A-FIB  W COMPLETE EP STUDY performed by Yehuda Godinez MD at [facility] CARDIAC CATH LAB    ND ICAR CATHETER ABLATION ARRHYTHMIA ADD ON N/A 9/18/2020    Ablation Svt/Vt Add On performed by Yehuda Godinez MD at [facility] CARDIAC CATH LAB    ND INSERTION SUBQ CARDIAC RHYTHM MONITOR W/PRGRMG N/A 5/15/2020    LOOP RECORDER INSERT performed by Yehuda Godinez MD at [facility] CARDIAC CATH LAB    ND INTRACARD ECHO, THER/DX INTERVENT N/A 9/18/2020    Intracardiac Echocardiogram performed by Yehuda Godinez MD at [facility] CARDIAC CATH LAB    ND INTRACARDIAC ELECTROPHYSIOLOGIC 3D MAPPING N/A 9/18/2020    Ep 3d Mapping performed by Yehuda Godinez MD at [facility] CARDIAC CATH LAB       Family History   Problem Relation Age of Onset    No Known Problems Mother     Heart Attack Father     No Known Problems Sister     No Known Problems Brother     No Known Problems Maternal Grandmother     No Known Problems Maternal Grandfather     Heart Failure Paternal Grandmother     Cancer Paternal Grandfather         esophagus    No Known Problems Other        Social History     Socioeconomic History    Marital status:      Spouse name: Tatyana   Occupational History    Occupation:      Comment: [employer]   Tobacco Use    Smoking status: Never    Smokeless tobacco: Never   Vaping Use    Vaping Use: Never used   Substance and Sexual Activity    Alcohol use: No    Drug use: No    Sexual activity: Yes     Partners: Female   Other Topics Concern     Service No       Review of Systems   Constitutional:  Negative for chills, fever, malaise/fatigue and weight loss. HENT:  Negative for congestion, hearing loss, sore throat and tinnitus. Eyes:  Negative for blurred vision, pain and discharge. Respiratory:  Negative for cough, shortness of breath and wheezing. Cardiovascular:  Negative for chest pain, palpitations, orthopnea, claudication and leg swelling. Gastrointestinal:  Negative for abdominal pain, constipation and heartburn. Genitourinary:  Negative for dysuria, frequency and urgency. Musculoskeletal:  Negative for falls, joint pain and myalgias. Skin:  Positive for itching and rash. Neurological:  Negative for dizziness, tingling, tremors and headaches. Endo/Heme/Allergies:  Negative for environmental allergies and polydipsia. Psychiatric/Behavioral:  Negative for depression and substance abuse. The patient is not nervous/anxious. Visit Vitals  /71 (BP 1 Location: Left upper arm, BP Patient Position: Sitting, BP Cuff Size: Adult)   Pulse 71   Temp 98 °F (36.7 °C) (Tympanic)   Resp 18   Ht 6' 1\" (1.854 m)   Wt 298 lb (135.2 kg)   SpO2 98%   BMI 39.32 kg/m²     Physical Exam  Vitals reviewed. Constitutional:       General: He is not in acute distress.      Appearance: Normal appearance. He is well-developed. He is obese. He is not ill-appearing or toxic-appearing. HENT:      Head: Normocephalic and atraumatic. Right Ear: Tympanic membrane, ear canal and external ear normal.      Left Ear: Tympanic membrane, ear canal and external ear normal.      Nose: Nose normal. No congestion or rhinorrhea. Mouth/Throat:      Mouth: Mucous membranes are moist.      Pharynx: No oropharyngeal exudate or posterior oropharyngeal erythema. Eyes:      General:         Right eye: No discharge. Left eye: No discharge. Extraocular Movements: Extraocular movements intact. Conjunctiva/sclera: Conjunctivae normal.      Pupils: Pupils are equal, round, and reactive to light. Comments: Pupil iris and anterior chamber normal.   Neck:      Trachea: No tracheal deviation. Cardiovascular:      Rate and Rhythm: Normal rate and regular rhythm. Pulses: Normal pulses. Heart sounds: Normal heart sounds. No murmur heard. No friction rub. No gallop. Pulmonary:      Effort: Pulmonary effort is normal. No respiratory distress. Breath sounds: Normal breath sounds. No wheezing, rhonchi or rales. Chest:      Chest wall: No tenderness. Abdominal:      General: Bowel sounds are normal. There is no distension. Palpations: Abdomen is soft. There is no mass. Tenderness: There is no abdominal tenderness. There is no guarding or rebound. Hernia: No hernia is present. Musculoskeletal:         General: No swelling or tenderness. Normal range of motion. Cervical back: Normal range of motion and neck supple. Right lower leg: No edema. Left lower leg: No edema. Lymphadenopathy:      Cervical: No cervical adenopathy. Skin:     General: Skin is warm and dry. Coloration: Skin is not pale. Findings: Erythema and rash present.       Comments: Patient has a rash that is consistent with seborrheic dermatitis but is worse in the area of his beard and a little less up around his cheeks and nose which area is aggravated by having the mask on it all night. Neurological:      General: No focal deficit present. Mental Status: He is alert and oriented to person, place, and time. Cranial Nerves: No cranial nerve deficit. Sensory: No sensory deficit. Motor: No abnormal muscle tone. Deep Tendon Reflexes: Reflexes are normal and symmetric. Reflexes normal.      Comments: Cranial nerves II through XII intact sensory and motor. Deep tendon reflexes in the biceps triceps knee and ankle are normal and bilaterally symmetrical.   Psychiatric:         Mood and Affect: Mood normal.         Behavior: Behavior normal.         Thought Content:  Thought content normal.         Judgment: Judgment normal.         ICD-10-CM ICD-9-CM    1. Seborrheic dermatitis  L21.9 690.10           Orders Placed This Encounter    metoprolol succinate (TOPROL-XL) 25 mg XL tablet     Sig: metoprolol succinate ER 25 mg tablet,extended release 24 hr           Ray Osborn MD

## 2023-01-19 RX ORDER — OSELTAMIVIR PHOSPHATE 75 MG/1
75 CAPSULE ORAL DAILY
Qty: 10 CAPSULE | Refills: 0 | Status: SHIPPED | OUTPATIENT
Start: 2023-01-19 | End: 2023-01-29

## 2023-01-23 ENCOUNTER — OFFICE VISIT (OUTPATIENT)
Dept: FAMILY MEDICINE CLINIC | Age: 53
End: 2023-01-23
Payer: COMMERCIAL

## 2023-01-23 VITALS
RESPIRATION RATE: 18 BRPM | HEIGHT: 73 IN | DIASTOLIC BLOOD PRESSURE: 84 MMHG | TEMPERATURE: 97 F | BODY MASS INDEX: 40.56 KG/M2 | SYSTOLIC BLOOD PRESSURE: 136 MMHG | OXYGEN SATURATION: 96 % | WEIGHT: 306 LBS | HEART RATE: 72 BPM

## 2023-01-23 DIAGNOSIS — E78.2 MIXED DYSLIPIDEMIA: ICD-10-CM

## 2023-01-23 DIAGNOSIS — E66.01 SEVERE OBESITY (HCC): ICD-10-CM

## 2023-01-23 DIAGNOSIS — J30.9 CHRONIC ALLERGIC RHINITIS: ICD-10-CM

## 2023-01-23 DIAGNOSIS — L21.9 SEBORRHEIC DERMATITIS: ICD-10-CM

## 2023-01-23 DIAGNOSIS — E78.1 HYPERTRIGLYCERIDEMIA: ICD-10-CM

## 2023-01-23 DIAGNOSIS — E78.2 MIXED HYPERLIPIDEMIA: ICD-10-CM

## 2023-01-23 DIAGNOSIS — N40.1 BPH WITH OBSTRUCTION/LOWER URINARY TRACT SYMPTOMS: ICD-10-CM

## 2023-01-23 DIAGNOSIS — J45.20 MILD INTERMITTENT ASTHMA WITHOUT COMPLICATION: Primary | ICD-10-CM

## 2023-01-23 DIAGNOSIS — I49.5 SSS (SICK SINUS SYNDROME) (HCC): Chronic | ICD-10-CM

## 2023-01-23 DIAGNOSIS — N13.8 BPH WITH OBSTRUCTION/LOWER URINARY TRACT SYMPTOMS: ICD-10-CM

## 2023-01-23 DIAGNOSIS — K20.0 EOSINOPHILIC ESOPHAGITIS: ICD-10-CM

## 2023-01-23 DIAGNOSIS — I65.23 BILATERAL CAROTID ARTERY STENOSIS: ICD-10-CM

## 2023-01-23 DIAGNOSIS — Z95.0 PACEMAKER: ICD-10-CM

## 2023-01-23 DIAGNOSIS — R06.2 WHEEZING: ICD-10-CM

## 2023-01-23 PROBLEM — M25.552 LEFT HIP PAIN: Status: RESOLVED | Noted: 2021-02-12 | Resolved: 2023-01-23

## 2023-01-23 PROBLEM — R55 SYNCOPE AND COLLAPSE: Status: RESOLVED | Noted: 2019-01-14 | Resolved: 2023-01-23

## 2023-01-23 PROCEDURE — 99214 OFFICE O/P EST MOD 30 MIN: CPT | Performed by: FAMILY MEDICINE

## 2023-01-23 PROCEDURE — 36415 COLL VENOUS BLD VENIPUNCTURE: CPT | Performed by: FAMILY MEDICINE

## 2023-01-23 RX ORDER — ALBUTEROL SULFATE 90 UG/1
2 AEROSOL, METERED RESPIRATORY (INHALATION)
Qty: 3 EACH | Refills: 1 | Status: SHIPPED | OUTPATIENT
Start: 2023-01-23

## 2023-01-23 RX ORDER — FENOFIBRATE 145 MG/1
145 TABLET, COATED ORAL DAILY
Qty: 90 TABLET | Refills: 1 | Status: SHIPPED | OUTPATIENT
Start: 2023-01-23

## 2023-01-23 RX ORDER — PANTOPRAZOLE SODIUM 40 MG/1
40 TABLET, DELAYED RELEASE ORAL 2 TIMES DAILY
Qty: 180 TABLET | Refills: 1 | Status: SHIPPED | OUTPATIENT
Start: 2023-01-23

## 2023-01-23 RX ORDER — ATORVASTATIN CALCIUM 40 MG/1
40 TABLET, FILM COATED ORAL DAILY
Qty: 90 TABLET | Refills: 1 | Status: SHIPPED | OUTPATIENT
Start: 2023-01-23

## 2023-01-23 RX ORDER — FAMOTIDINE 40 MG/1
40 TABLET, FILM COATED ORAL
Qty: 90 TABLET | Refills: 1 | Status: SHIPPED | OUTPATIENT
Start: 2023-01-23

## 2023-01-23 RX ORDER — METOPROLOL SUCCINATE 25 MG/1
TABLET, EXTENDED RELEASE ORAL
Qty: 90 TABLET | Refills: 1 | Status: SHIPPED | OUTPATIENT
Start: 2023-01-23

## 2023-01-23 RX ORDER — CLOTRIMAZOLE AND BETAMETHASONE DIPROPIONATE 10; .64 MG/G; MG/G
CREAM TOPICAL 2 TIMES DAILY
Qty: 15 G | Refills: 1 | Status: SHIPPED | OUTPATIENT
Start: 2023-01-23

## 2023-01-23 RX ORDER — FLUTICASONE PROPIONATE 50 MCG
SPRAY, SUSPENSION (ML) NASAL
Qty: 3 EACH | Refills: 1 | Status: SHIPPED | OUTPATIENT
Start: 2023-01-23

## 2023-01-23 RX ORDER — MONTELUKAST SODIUM 10 MG/1
10 TABLET ORAL DAILY
Qty: 90 TABLET | Refills: 1 | Status: SHIPPED | OUTPATIENT
Start: 2023-01-23

## 2023-01-23 NOTE — PROGRESS NOTES
Trinda Closs is a 50 y.o. male who presents with the following:  Chief Complaint   Patient presents with    Cholesterol Problem    Arthritis    GERD    Heart Problem    Skin Problem    Benign Prostatic Hypertrophy    Asthma       The patient sick sinus syndrome is doing well with his pacemaker in place her infection has been fully cleared up. Patient has bilateral carotid artery stenosis with rather quiet bruits. Patient is having no CNS symptoms from this. Patient does have eosinophilic esophagitis which is doing well on his current medications without any heartburn. Patient does have seborrheic dermatitis which likewise is doing well on his steroid and antifungal creams. He is having no itching and no dandruff. Patient has BPH with obstructive lower urinary tract symptoms but he is getting by but he has not had a PSA in quite some time. Patient has had no urinary tract infection no bleeding and is just bothered by frequency and a smaller stream than usual.  The patient is actually past the severe obesity phase and is grown into morbid obesity we discussed this today that he needs to cut back on his portion sizes and start losing some weight because it is affecting his breathing and his heart his cholesterol and his sugar was up on one of his labs. Patient also has mixed dyslipidemia which is being treated and he is doing well without any muscle pain or weakness. Patient does have mild intermittent asthma without complication at this time as his lungs are moving air nicely without any wheezing. Allergies   Allergen Reactions    Dermabond [2-Octyl Cyanoacrylate] Atopic Dermatitis    Hornet Venom Swelling     Carries Epi pen    Adhesive Rash     Dermabond       Current Outpatient Medications   Medication Sig    fluticasone propionate (FLONASE) 50 mcg/actuation nasal spray 1 spray each nostril twice daily    montelukast (SINGULAIR) 10 mg tablet Take 1 Tablet by mouth daily.     atorvastatin (LIPITOR) 40 mg tablet Take 1 Tablet by mouth daily. fenofibrate nanocrystallized (TRICOR) 145 mg tablet Take 1 Tablet by mouth daily. famotidine (PEPCID) 40 mg tablet Take 1 Tablet by mouth nightly. pantoprazole (PROTONIX) 40 mg tablet Take 1 Tablet by mouth two (2) times a day. metoprolol succinate (TOPROL-XL) 25 mg XL tablet metoprolol succinate ER 25 mg tablet,extended release 24 hr    albuterol (PROVENTIL HFA, VENTOLIN HFA, PROAIR HFA) 90 mcg/actuation inhaler Take 2 Puffs by inhalation every four (4) hours as needed for Wheezing. clotrimazole-betamethasone (LOTRISONE) topical cream Apply  to affected area two (2) times a day. oseltamivir (TAMIFLU) 75 mg capsule Take 1 Capsule by mouth daily for 10 days. diclofenac (VOLTAREN) 1 % gel APPLY 4 G 4 TIMES A DAY    EPINEPHrine (EPIPEN) 0.3 mg/0.3 mL injection INJECT 0.3ML BY INTRAMUSCLAR ROUTE ONCE AS NEEDED FOR ALLERGIC RESPONSE FOR UP TO 1 DOSE    ketotifen (ZADITOR) 0.025 % (0.035 %) ophthalmic solution Administer 1 Drop to both eyes two (2) times daily as needed (itchy watery eyes). No current facility-administered medications for this visit.        Past Medical History:   Diagnosis Date    Acid reflux     Acute pain of right shoulder 1/30/2018    Asthma     Atrial fibrillation ([address] 2020    Carotid artery disease (HCC)     Class 2 obesity due to excess calories without serious comorbidity in adult 7/19/2018    Infected pacemaker ([facility] [address] 10/3/2021    Pacemaker     Sinus problem     Urine troubles        Past Surgical History:   Procedure Laterality Date    HX AFIB ABLATION  09/18/2020    HX ENDOSCOPY  2020    HX ENDOSCOPY      HX PACEMAKER      HX ROTATOR CUFF REPAIR  04/03/2018    HX VASECTOMY  2017    KS COMPRE EP EVAL ABLTJ 3D MAPG TX SVT N/A 9/18/2020    ABLATION SVT performed by Kings Asencio MD at [facility] CATH [facility]    KS COMPRE EP EVAL ABLTJ ATR FIB PULM VEIN ISOLATION N/A 9/18/2020    ABLATION A-FIB  W COMPLETE EP STUDY performed by Yoana Lerner MD at [facility] CARDIAC CATH LAB    OK ICAR CATHETER ABLATION ARRHYTHMIA ADD ON N/A 9/18/2020    Ablation Svt/Vt Add On performed by Yoana Lerner MD at [facility] CARDIAC CATH LAB    OK INSERTION SUBQ CARDIAC RHYTHM MONITOR W/PRGRMG N/A 5/15/2020    LOOP RECORDER INSERT performed by Yoana Lerner MD at [facility] CARDIAC CATH LAB    OK INTRACARD ECHOCARD W/THER/DX IVNTJ INCL IMG S&I N/A 9/18/2020    Intracardiac Echocardiogram performed by Yoana Lerner MD at [facility] CARDIAC CATH LAB    OK INTRACARDIAC ELECTROPHYSIOLOGIC 3D MAPPING N/A 9/18/2020    Ep 3d Mapping performed by Yoana Lerner MD at [address] ing hernia repair with repeat R    OK UNLISTED PROCEDURE ABDOMEN PERITONEUM & OMENTUM      umbilical hernia repair    OK UNLISTED PROCEDURE CARDIAC SURGERY  05/2020    loop recorder insertion       Family History   Problem Relation Age of Onset    No Known Problems Mother     Heart Attack Father     No Known Problems Sister     No Known Problems Brother     No Known Problems Maternal Grandmother     No Known Problems Maternal Grandfather     Heart Failure Paternal Grandmother     Cancer Paternal Grandfather         esophagus    No Known Problems Other        Social History     Socioeconomic History    Marital status:      Spouse name: Yunior Mackenzie   Occupational History    Occupation:      Comment: [employer]   Tobacco Use    Smoking status: Never    Smokeless tobacco: Never   Vaping Use    Vaping Use: Never used   Substance and Sexual Activity    Alcohol use: No    Drug use: No    Sexual activity: Yes     Partners: Female   Other Topics Concern     Service No       Review of Systems   Constitutional:  Negative for chills, fever, malaise/fatigue and weight loss. HENT:  Negative for congestion, hearing loss, sore throat and tinnitus.     Eyes:  Negative for blurred vision, pain and discharge. Respiratory:  Negative for cough, shortness of breath and wheezing. Cardiovascular:  Negative for chest pain, palpitations, orthopnea, claudication and leg swelling. Gastrointestinal:  Negative for abdominal pain, constipation and heartburn. Genitourinary:  Positive for frequency. Negative for dysuria and urgency. Musculoskeletal:  Negative for falls, joint pain and myalgias. Skin:  Positive for rash. Negative for itching. Neurological:  Negative for dizziness, tingling, tremors and headaches. Endo/Heme/Allergies:  Negative for environmental allergies and polydipsia. Psychiatric/Behavioral:  Negative for depression and substance abuse. The patient is not nervous/anxious. Visit Vitals  /84 (BP 1 Location: Left upper arm)   Pulse 72   Temp 97 °F (36.1 °C) (Temporal)   Resp 18   Ht 6' 1\" (1.854 m)   Wt 306 lb (138.8 kg)   SpO2 96%   BMI 40.37 kg/m²     Physical Exam  Vitals reviewed. Constitutional:       General: He is not in acute distress. Appearance: Normal appearance. He is well-developed. He is not toxic-appearing. HENT:      Head: Normocephalic and atraumatic. Right Ear: Tympanic membrane, ear canal and external ear normal.      Left Ear: Tympanic membrane, ear canal and external ear normal.      Nose: Nose normal. No congestion or rhinorrhea. Mouth/Throat:      Mouth: Mucous membranes are moist.      Pharynx: No oropharyngeal exudate or posterior oropharyngeal erythema. Eyes:      General:         Right eye: No discharge. Left eye: No discharge. Extraocular Movements: Extraocular movements intact. Conjunctiva/sclera: Conjunctivae normal.      Pupils: Pupils are equal, round, and reactive to light. Comments: Pupil iris and anterior chamber normal.   Neck:      Vascular: Carotid bruit present. Trachea: No tracheal deviation. Cardiovascular:      Rate and Rhythm: Normal rate and regular rhythm. Pulses: Normal pulses. Heart sounds: Normal heart sounds. No murmur heard. No friction rub. No gallop. Pulmonary:      Effort: Pulmonary effort is normal. No respiratory distress. Breath sounds: Normal breath sounds. No wheezing, rhonchi or rales. Chest:      Chest wall: No tenderness. Abdominal:      General: Bowel sounds are normal. There is no distension. Palpations: Abdomen is soft. There is no mass. Tenderness: There is no abdominal tenderness. There is no guarding or rebound. Hernia: No hernia is present. Musculoskeletal:         General: No swelling or tenderness. Normal range of motion. Cervical back: Normal range of motion and neck supple. Right lower leg: Edema present. Left lower leg: Edema present. Lymphadenopathy:      Cervical: No cervical adenopathy. Skin:     General: Skin is warm and dry. Coloration: Skin is not pale. Findings: No erythema or rash. Neurological:      General: No focal deficit present. Mental Status: He is alert and oriented to person, place, and time. Cranial Nerves: No cranial nerve deficit. Sensory: No sensory deficit. Motor: No weakness or abnormal muscle tone. Coordination: Coordination normal.      Gait: Gait normal.      Deep Tendon Reflexes: Reflexes are normal and symmetric. Reflexes normal.      Comments: Cranial nerves II through XII intact sensory and motor. Deep tendon reflexes in the biceps triceps knee and ankle are normal and bilaterally symmetrical.   Psychiatric:         Mood and Affect: Mood normal.         Behavior: Behavior normal.         Thought Content: Thought content normal.         Judgment: Judgment normal.         ICD-10-CM ICD-9-CM    1. Mild intermittent asthma without complication  N63.99 888.03       2. Chronic allergic rhinitis  J30.9 477.9 fluticasone propionate (FLONASE) 50 mcg/actuation nasal spray      montelukast (SINGULAIR) 10 mg tablet      3.  Mixed hyperlipidemia  E78.2 272.2 atorvastatin (LIPITOR) 40 mg tablet      CBC WITH AUTOMATED DIFF      METABOLIC PANEL, COMPREHENSIVE      LIPID PANEL      OR COLLECTION VENOUS BLOOD VENIPUNCTURE      LIPID PANEL      METABOLIC PANEL, COMPREHENSIVE      CBC WITH AUTOMATED DIFF      4. Hypertriglyceridemia  E78.1 272.1 fenofibrate nanocrystallized (TRICOR) 145 mg tablet      5. Eosinophilic esophagitis  C44.7 530.13 famotidine (PEPCID) 40 mg tablet      pantoprazole (PROTONIX) 40 mg tablet      6. Wheezing  R06.2 786.07 albuterol (PROVENTIL HFA, VENTOLIN HFA, PROAIR HFA) 90 mcg/actuation inhaler      7. Seborrheic dermatitis  L21.9 690.10 clotrimazole-betamethasone (LOTRISONE) topical cream      8. BPH with obstruction/lower urinary tract symptoms  N40.1 600.01 PSA W/ REFLX FREE PSA    N13.8 599.69 PSA W/ REFLX FREE PSA      9. SSS (sick sinus syndrome) (HCC)  I49.5 427.81       10. Bilateral carotid artery stenosis  I65.23 433.10      433.30       11. Pacemaker  Z95.0 V45.01       12. Mixed dyslipidemia  E78.2 272.2       13. Severe obesity ([facility] [address]  E66.01 278.01           Orders Placed This Encounter    CBC WITH AUTOMATED DIFF     Standing Status:   Future     Number of Occurrences:   1     Standing Expiration Date:   5678    METABOLIC PANEL, COMPREHENSIVE     Standing Status:   Future     Number of Occurrences:   1     Standing Expiration Date:   2023    LIPID PANEL     Standing Status:   Future     Number of Occurrences:   1     Standing Expiration Date:   2023    PSA W/ REFLX FREE PSA     Standing Status:   Future     Number of Occurrences:   1     Standing Expiration Date:   2024    OR COLLECTION VENOUS BLOOD VENIPUNCTURE    fluticasone propionate (FLONASE) 50 mcg/actuation nasal spray     Si spray each nostril twice daily     Dispense:  3 Each     Refill:  1    montelukast (SINGULAIR) 10 mg tablet     Sig: Take 1 Tablet by mouth daily.      Dispense:  90 Tablet     Refill:  1    atorvastatin (LIPITOR) 40 mg tablet Sig: Take 1 Tablet by mouth daily. Dispense:  90 Tablet     Refill:  1    fenofibrate nanocrystallized (TRICOR) 145 mg tablet     Sig: Take 1 Tablet by mouth daily. Dispense:  90 Tablet     Refill:  1    famotidine (PEPCID) 40 mg tablet     Sig: Take 1 Tablet by mouth nightly. Dispense:  90 Tablet     Refill:  1    pantoprazole (PROTONIX) 40 mg tablet     Sig: Take 1 Tablet by mouth two (2) times a day. Dispense:  180 Tablet     Refill:  1    metoprolol succinate (TOPROL-XL) 25 mg XL tablet     Sig: metoprolol succinate ER 25 mg tablet,extended release 24 hr     Dispense:  90 Tablet     Refill:  1    albuterol (PROVENTIL HFA, VENTOLIN HFA, PROAIR HFA) 90 mcg/actuation inhaler     Sig: Take 2 Puffs by inhalation every four (4) hours as needed for Wheezing. Dispense:  3 Each     Refill:  1    clotrimazole-betamethasone (LOTRISONE) topical cream     Sig: Apply  to affected area two (2) times a day. Dispense:  15 g     Refill:  1   Various weight loss programs were discussed with patient and I advised him to stay away from the [address] [place] [address] because of the excessive load it puts on his lipids. I suggested a Mediterranean type diet would be the better 1 to follow but he also has to put a premium on eating the proper portions and not overeating. Follow-up and Dispositions    Return in about 6 months (around 7/23/2023), or if symptoms worsen or fail to improve.          Vj Miller MD

## 2023-01-23 NOTE — PROGRESS NOTES
Labs drawn in left arm per Dr Chantal Beck orders. Pt tolerated well. 1. \"Have you been to the ER, urgent care clinic since your last visit? Hospitalized since your last visit? \" No    2. \"Have you seen or consulted any other health care providers outside of the [address] [place] [address] since your last visit? \" Yes When: Dr Loura Gilford 10-3-22      3. For patients aged 39-70: Has the patient had a colonoscopy / FIT/ Cologuard? Yes - no Care Gap present      If the patient is female:    4. For patients aged 41-77: Has the patient had a mammogram within the past 2 years? no      5. For patients aged 21-65: Has the patient had a pap smear?  NA - based on age or sex

## 2023-01-24 LAB
ALBUMIN SERPL-MCNC: 3.9 G/DL (ref 3.5–5)
ALBUMIN/GLOB SERPL: 1.3 (ref 1.1–2.2)
ALP SERPL-CCNC: 85 U/L (ref 45–117)
ALT SERPL-CCNC: 48 U/L (ref 12–78)
ANION GAP SERPL CALC-SCNC: 5 MMOL/L (ref 5–15)
AST SERPL-CCNC: 24 U/L (ref 15–37)
BASOPHILS # BLD: 0 K/UL (ref 0–0.1)
BASOPHILS NFR BLD: 1 % (ref 0–1)
BILIRUB SERPL-MCNC: 0.3 MG/DL (ref 0.2–1)
BUN SERPL-MCNC: 17 MG/DL (ref 6–20)
BUN/CREAT SERPL: 17 (ref 12–20)
CALCIUM SERPL-MCNC: 8.7 MG/DL (ref 8.5–10.1)
CHLORIDE SERPL-SCNC: 110 MMOL/L (ref 97–108)
CHOLEST SERPL-MCNC: 241 MG/DL
CO2 SERPL-SCNC: 27 MMOL/L (ref 21–32)
CREAT SERPL-MCNC: 1.01 MG/DL (ref 0.7–1.3)
DIFFERENTIAL METHOD BLD: ABNORMAL
EOSINOPHIL # BLD: 0.5 K/UL (ref 0–0.4)
EOSINOPHIL NFR BLD: 6 % (ref 0–7)
ERYTHROCYTE [DISTWIDTH] IN BLOOD BY AUTOMATED COUNT: 13.1 % (ref 11.5–14.5)
GLOBULIN SER CALC-MCNC: 2.9 G/DL (ref 2–4)
GLUCOSE SERPL-MCNC: 110 MG/DL (ref 65–100)
HCT VFR BLD AUTO: 47.5 % (ref 36.6–50.3)
HDLC SERPL-MCNC: 39 MG/DL
HDLC SERPL: 6.2 (ref 0–5)
HGB BLD-MCNC: 15.2 G/DL (ref 12.1–17)
IMM GRANULOCYTES # BLD AUTO: 0 K/UL (ref 0–0.04)
IMM GRANULOCYTES NFR BLD AUTO: 1 % (ref 0–0.5)
LDLC SERPL CALC-MCNC: 156.8 MG/DL (ref 0–100)
LYMPHOCYTES # BLD: 2.2 K/UL (ref 0.8–3.5)
LYMPHOCYTES NFR BLD: 31 % (ref 12–49)
MCH RBC QN AUTO: 28.1 PG (ref 26–34)
MCHC RBC AUTO-ENTMCNC: 32 G/DL (ref 30–36.5)
MCV RBC AUTO: 87.8 FL (ref 80–99)
MONOCYTES # BLD: 0.5 K/UL (ref 0–1)
MONOCYTES NFR BLD: 7 % (ref 5–13)
NEUTS SEG # BLD: 4 K/UL (ref 1.8–8)
NEUTS SEG NFR BLD: 54 % (ref 32–75)
NRBC # BLD: 0 K/UL (ref 0–0.01)
NRBC BLD-RTO: 0 PER 100 WBC
PLATELET # BLD AUTO: 253 K/UL (ref 150–400)
PMV BLD AUTO: 9.9 FL (ref 8.9–12.9)
POTASSIUM SERPL-SCNC: 4.4 MMOL/L (ref 3.5–5.1)
PROT SERPL-MCNC: 6.8 G/DL (ref 6.4–8.2)
RBC # BLD AUTO: 5.41 M/UL (ref 4.1–5.7)
SODIUM SERPL-SCNC: 142 MMOL/L (ref 136–145)
TRIGL SERPL-MCNC: 226 MG/DL (ref ?–150)
VLDLC SERPL CALC-MCNC: 45.2 MG/DL
WBC # BLD AUTO: 7.3 K/UL (ref 4.1–11.1)

## 2023-01-26 LAB
PSA SERPL-MCNC: 0.4 NG/ML (ref 0–4)
REFLEX CRITERIA: NORMAL

## 2023-05-17 ENCOUNTER — OFFICE VISIT (OUTPATIENT)
Age: 53
End: 2023-05-17
Payer: COMMERCIAL

## 2023-05-17 VITALS
HEART RATE: 71 BPM | HEIGHT: 73 IN | RESPIRATION RATE: 18 BRPM | SYSTOLIC BLOOD PRESSURE: 122 MMHG | TEMPERATURE: 98.4 F | BODY MASS INDEX: 38.83 KG/M2 | WEIGHT: 293 LBS | DIASTOLIC BLOOD PRESSURE: 78 MMHG | OXYGEN SATURATION: 98 %

## 2023-05-17 DIAGNOSIS — E66.01 SEVERE OBESITY (HCC): ICD-10-CM

## 2023-05-17 DIAGNOSIS — Z91.038 HYMENOPTERA ALLERGY: ICD-10-CM

## 2023-05-17 DIAGNOSIS — L21.9 SEBORRHEIC DERMATITIS: ICD-10-CM

## 2023-05-17 DIAGNOSIS — N13.8 BPH WITH OBSTRUCTION/LOWER URINARY TRACT SYMPTOMS: ICD-10-CM

## 2023-05-17 DIAGNOSIS — J45.20 MILD INTERMITTENT ASTHMA WITHOUT COMPLICATION: ICD-10-CM

## 2023-05-17 DIAGNOSIS — N40.1 BPH WITH OBSTRUCTION/LOWER URINARY TRACT SYMPTOMS: ICD-10-CM

## 2023-05-17 DIAGNOSIS — K20.0 EOSINOPHILIC ESOPHAGITIS: ICD-10-CM

## 2023-05-17 DIAGNOSIS — K21.00 GASTROESOPHAGEAL REFLUX DISEASE WITH ESOPHAGITIS WITHOUT HEMORRHAGE: ICD-10-CM

## 2023-05-17 DIAGNOSIS — Z95.0 PACEMAKER: ICD-10-CM

## 2023-05-17 DIAGNOSIS — I65.23 BILATERAL CAROTID ARTERY STENOSIS: ICD-10-CM

## 2023-05-17 DIAGNOSIS — Z76.89 ENCOUNTER FOR SUPPORT AND COORDINATION OF TRANSITION OF CARE: Primary | ICD-10-CM

## 2023-05-17 DIAGNOSIS — E78.2 MIXED DYSLIPIDEMIA: ICD-10-CM

## 2023-05-17 DIAGNOSIS — G45.9 TRANSIENT ISCHEMIC ATTACK: ICD-10-CM

## 2023-05-17 DIAGNOSIS — I49.5 SSS (SICK SINUS SYNDROME) (HCC): ICD-10-CM

## 2023-05-17 DIAGNOSIS — M54.12 CERVICAL RADICULOPATHY AT C7: ICD-10-CM

## 2023-05-17 PROCEDURE — 99214 OFFICE O/P EST MOD 30 MIN: CPT | Performed by: FAMILY MEDICINE

## 2023-05-17 RX ORDER — ASPIRIN 81 MG/1
81 TABLET ORAL DAILY
COMMUNITY

## 2023-05-17 RX ORDER — CLOPIDOGREL BISULFATE 75 MG/1
75 TABLET ORAL DAILY
COMMUNITY

## 2023-05-17 SDOH — ECONOMIC STABILITY: HOUSING INSECURITY
IN THE LAST 12 MONTHS, WAS THERE A TIME WHEN YOU DID NOT HAVE A STEADY PLACE TO SLEEP OR SLEPT IN A SHELTER (INCLUDING NOW)?: NO

## 2023-05-17 SDOH — ECONOMIC STABILITY: INCOME INSECURITY: HOW HARD IS IT FOR YOU TO PAY FOR THE VERY BASICS LIKE FOOD, HOUSING, MEDICAL CARE, AND HEATING?: NOT HARD AT ALL

## 2023-05-17 SDOH — ECONOMIC STABILITY: FOOD INSECURITY: WITHIN THE PAST 12 MONTHS, THE FOOD YOU BOUGHT JUST DIDN'T LAST AND YOU DIDN'T HAVE MONEY TO GET MORE.: NEVER TRUE

## 2023-05-17 SDOH — ECONOMIC STABILITY: FOOD INSECURITY: WITHIN THE PAST 12 MONTHS, YOU WORRIED THAT YOUR FOOD WOULD RUN OUT BEFORE YOU GOT MONEY TO BUY MORE.: NEVER TRUE

## 2023-05-17 ASSESSMENT — ENCOUNTER SYMPTOMS
SINUS PAIN: 0
BLOOD IN STOOL: 0
SINUS PRESSURE: 0
BACK PAIN: 0
RHINORRHEA: 0
SHORTNESS OF BREATH: 0
DIARRHEA: 0
VOMITING: 0
CONSTIPATION: 0
ABDOMINAL DISTENTION: 0
EYE PAIN: 0
ANAL BLEEDING: 0
CHEST TIGHTNESS: 0
NAUSEA: 0
VOICE CHANGE: 0
EYE REDNESS: 0
ABDOMINAL PAIN: 0
COUGH: 0
WHEEZING: 0

## 2023-05-17 ASSESSMENT — PATIENT HEALTH QUESTIONNAIRE - PHQ9
SUM OF ALL RESPONSES TO PHQ QUESTIONS 1-9: 0
SUM OF ALL RESPONSES TO PHQ9 QUESTIONS 1 & 2: 0
SUM OF ALL RESPONSES TO PHQ QUESTIONS 1-9: 0
2. FEELING DOWN, DEPRESSED OR HOPELESS: 0
1. LITTLE INTEREST OR PLEASURE IN DOING THINGS: 0

## 2023-05-17 ASSESSMENT — ANXIETY QUESTIONNAIRES
2. NOT BEING ABLE TO STOP OR CONTROL WORRYING: 0
3. WORRYING TOO MUCH ABOUT DIFFERENT THINGS: 0
1. FEELING NERVOUS, ANXIOUS, OR ON EDGE: 0
4. TROUBLE RELAXING: 0
7. FEELING AFRAID AS IF SOMETHING AWFUL MIGHT HAPPEN: 0
6. BECOMING EASILY ANNOYED OR IRRITABLE: 0
GAD7 TOTAL SCORE: 0
5. BEING SO RESTLESS THAT IT IS HARD TO SIT STILL: 0
IF YOU CHECKED OFF ANY PROBLEMS ON THIS QUESTIONNAIRE, HOW DIFFICULT HAVE THESE PROBLEMS MADE IT FOR YOU TO DO YOUR WORK, TAKE CARE OF THINGS AT HOME, OR GET ALONG WITH OTHER PEOPLE: NOT DIFFICULT AT ALL

## 2023-05-17 NOTE — PROGRESS NOTES
Vince Sam is a 50 y.o. male who presents with the following:  Chief Complaint   Patient presents with    Cholesterol Problem    Numbness    Gastroesophageal Reflux    Asthma    Heart Problem     Sick sinus syndrome    Diabetes     Prediabetic       Patient comes in after hospital stay for which fortunately turned out to be a TIA that affected the left-hand side of his body with numbness from his face down to his toes but it cleared up in about 12 to 18 hours and the patient was discharged home on clopidogrel 75 mg daily for 21 days and 81 mg of aspirin. The patient had stopped his atorvastatin because he had taken it once and became somewhat nauseated but he took it with all of his other medications which may have caused the situation or it was just serendipitous. In any event the hospital placed him back on the medication and he has been able to take it without any nausea taking it at night with 2 other medicines rather than his other 7 medicines. The patient's GERD is doing well on current medications of pantoprazole and famotidine but the hospital did cut his fenofibrate back from 145-48. Patient's pacemaker is still working but he is also on metoprolol 25 mg twice daily. The patient was also told his blood sugars were borderline close to being diabetic and needed to start working with diet and exercise to get his weight down. They did an A1c that was 5.9 which is prediabetic.       Allergies   Allergen Reactions    Cyanoacrylate Dermatitis    Hornet Venom Swelling     Carries Epi pen    Adhesive Tape Rash     Dermabond       Current Outpatient Medications   Medication Sig Dispense Refill    clopidogrel (PLAVIX) 75 MG tablet Take 1 tablet by mouth daily      aspirin 81 MG EC tablet Take 1 tablet by mouth daily      albuterol sulfate HFA (PROVENTIL;VENTOLIN;PROAIR) 108 (90 Base) MCG/ACT inhaler Inhale 2 puffs into the lungs every 4 hours as needed      atorvastatin (LIPITOR) 40 MG tablet Take 1

## 2023-05-18 LAB
ALBUMIN SERPL-MCNC: 4 G/DL (ref 3.5–5)
ALBUMIN/GLOB SERPL: 1.3 (ref 1.1–2.2)
ALP SERPL-CCNC: 86 U/L (ref 45–117)
ALT SERPL-CCNC: 46 U/L (ref 12–78)
ANION GAP SERPL CALC-SCNC: 3 MMOL/L (ref 5–15)
AST SERPL-CCNC: 31 U/L (ref 15–37)
BILIRUB SERPL-MCNC: 0.6 MG/DL (ref 0.2–1)
BUN SERPL-MCNC: 13 MG/DL (ref 6–20)
BUN/CREAT SERPL: 13 (ref 12–20)
CALCIUM SERPL-MCNC: 8.9 MG/DL (ref 8.5–10.1)
CHLORIDE SERPL-SCNC: 108 MMOL/L (ref 97–108)
CHOLEST SERPL-MCNC: 154 MG/DL
CO2 SERPL-SCNC: 28 MMOL/L (ref 21–32)
CREAT SERPL-MCNC: 0.97 MG/DL (ref 0.7–1.3)
ERYTHROCYTE [DISTWIDTH] IN BLOOD BY AUTOMATED COUNT: 13.1 % (ref 11.5–14.5)
GLOBULIN SER CALC-MCNC: 3 G/DL (ref 2–4)
GLUCOSE SERPL-MCNC: 103 MG/DL (ref 65–100)
HCT VFR BLD AUTO: 49.7 % (ref 36.6–50.3)
HDLC SERPL-MCNC: 37 MG/DL
HDLC SERPL: 4.2 (ref 0–5)
HGB BLD-MCNC: 15.7 G/DL (ref 12.1–17)
LDLC SERPL CALC-MCNC: 89 MG/DL (ref 0–100)
MCH RBC QN AUTO: 28.6 PG (ref 26–34)
MCHC RBC AUTO-ENTMCNC: 31.6 G/DL (ref 30–36.5)
MCV RBC AUTO: 90.5 FL (ref 80–99)
NRBC # BLD: 0 K/UL (ref 0–0.01)
NRBC BLD-RTO: 0 PER 100 WBC
PLATELET # BLD AUTO: 275 K/UL (ref 150–400)
PMV BLD AUTO: 9.5 FL (ref 8.9–12.9)
POTASSIUM SERPL-SCNC: 4.6 MMOL/L (ref 3.5–5.1)
PROT SERPL-MCNC: 7 G/DL (ref 6.4–8.2)
RBC # BLD AUTO: 5.49 M/UL (ref 4.1–5.7)
SODIUM SERPL-SCNC: 139 MMOL/L (ref 136–145)
TRIGL SERPL-MCNC: 140 MG/DL
VLDLC SERPL CALC-MCNC: 28 MG/DL
WBC # BLD AUTO: 8.5 K/UL (ref 4.1–11.1)

## 2023-05-31 ENCOUNTER — TELEPHONE (OUTPATIENT)
Age: 53
End: 2023-05-31

## 2023-05-31 NOTE — TELEPHONE ENCOUNTER
S/w pt's wife. States the pt needs a refill on his Fenofibrate 48mg (hospital changed dosage) , states pt was previously on metoprolol XR 25mg but hospital changed it to metoprolol tartrate 25mg. Would like to know which one Dr. Everett Curling wants him to take and he will need a refill on that. Also needs a refill on Aspirin 81mg.  [facility] [address] [facility]

## 2023-06-01 RX ORDER — ALBUTEROL SULFATE 90 UG/1
2 AEROSOL, METERED RESPIRATORY (INHALATION) EVERY 4 HOURS PRN
Qty: 48 G | Refills: 1 | Status: SHIPPED | OUTPATIENT
Start: 2023-06-01

## 2023-06-01 RX ORDER — METOPROLOL SUCCINATE 25 MG/1
50 TABLET, EXTENDED RELEASE ORAL DAILY
Qty: 90 TABLET | Refills: 1 | Status: SHIPPED | OUTPATIENT
Start: 2023-06-01

## 2023-06-01 RX ORDER — ASPIRIN 81 MG/1
81 TABLET ORAL DAILY
Qty: 90 TABLET | Refills: 1 | Status: SHIPPED | OUTPATIENT
Start: 2023-06-01

## 2023-07-25 ENCOUNTER — OFFICE VISIT (OUTPATIENT)
Age: 53
End: 2023-07-25
Payer: COMMERCIAL

## 2023-07-25 VITALS
HEIGHT: 73 IN | WEIGHT: 293.25 LBS | BODY MASS INDEX: 38.87 KG/M2 | SYSTOLIC BLOOD PRESSURE: 131 MMHG | TEMPERATURE: 97.6 F | DIASTOLIC BLOOD PRESSURE: 77 MMHG | HEART RATE: 64 BPM | OXYGEN SATURATION: 95 % | RESPIRATION RATE: 18 BRPM

## 2023-07-25 DIAGNOSIS — E66.01 SEVERE OBESITY (HCC): ICD-10-CM

## 2023-07-25 DIAGNOSIS — N40.1 BPH WITH OBSTRUCTION/LOWER URINARY TRACT SYMPTOMS: ICD-10-CM

## 2023-07-25 DIAGNOSIS — Z95.0 PACEMAKER: ICD-10-CM

## 2023-07-25 DIAGNOSIS — K21.00 GASTROESOPHAGEAL REFLUX DISEASE WITH ESOPHAGITIS WITHOUT HEMORRHAGE: ICD-10-CM

## 2023-07-25 DIAGNOSIS — I49.5 SSS (SICK SINUS SYNDROME) (HCC): ICD-10-CM

## 2023-07-25 DIAGNOSIS — N13.8 BPH WITH OBSTRUCTION/LOWER URINARY TRACT SYMPTOMS: ICD-10-CM

## 2023-07-25 DIAGNOSIS — J45.20 MILD INTERMITTENT ASTHMA WITHOUT COMPLICATION: ICD-10-CM

## 2023-07-25 DIAGNOSIS — E78.2 MIXED DYSLIPIDEMIA: ICD-10-CM

## 2023-07-25 DIAGNOSIS — I65.23 BILATERAL CAROTID ARTERY STENOSIS: Primary | ICD-10-CM

## 2023-07-25 PROCEDURE — 99214 OFFICE O/P EST MOD 30 MIN: CPT | Performed by: FAMILY MEDICINE

## 2023-07-25 ASSESSMENT — PATIENT HEALTH QUESTIONNAIRE - PHQ9
SUM OF ALL RESPONSES TO PHQ QUESTIONS 1-9: 0
1. LITTLE INTEREST OR PLEASURE IN DOING THINGS: 0
2. FEELING DOWN, DEPRESSED OR HOPELESS: 0
SUM OF ALL RESPONSES TO PHQ9 QUESTIONS 1 & 2: 0
SUM OF ALL RESPONSES TO PHQ QUESTIONS 1-9: 0

## 2023-07-25 ASSESSMENT — ENCOUNTER SYMPTOMS
VOMITING: 0
WHEEZING: 0
SHORTNESS OF BREATH: 0
COUGH: 0
BACK PAIN: 0
VOICE CHANGE: 0
BLOOD IN STOOL: 0
ABDOMINAL DISTENTION: 0
EYE REDNESS: 0
NAUSEA: 0
CHEST TIGHTNESS: 0
ABDOMINAL PAIN: 0
EYE PAIN: 0
RHINORRHEA: 0
CONSTIPATION: 0
DIARRHEA: 0
SINUS PRESSURE: 0
SINUS PAIN: 0
ANAL BLEEDING: 0

## 2023-07-25 NOTE — PROGRESS NOTES
1. \"Have you been to the ER, urgent care clinic since your last visit? Hospitalized since your last visit? \" No    2. \"Have you seen or consulted any other health care providers outside of the [address] [place] [address] since your last visit? \" No     3. For patients aged 43-73: Has the patient had a colonoscopy / FIT/ Cologuard? Yes - no Care Gap present     If the patient is female:    4. For patients aged 43-66: Has the patient had a mammogram within the past 2 years? NA - based on age    11. For patients aged 21-65: Has the patient had a pap smear? NA - based on age    No chief complaint on file. There were no vitals filed for this visit.
Labs drawn in left arm per ivanna's orders. Patient tolerated well.
8. Pacemaker            Orders Placed This Encounter   Procedures    Lipid Panel     Standing Status:   Future     Number of Occurrences:   1     Standing Expiration Date:   7/25/2024    CBC     Standing Status:   Future     Number of Occurrences:   1     Standing Expiration Date:   7/25/2024    Comprehensive Metabolic Panel     Standing Status:   Future     Number of Occurrences:   1     Standing Expiration Date:   7/25/2024    ID COLLECTION VENOUS BLOOD VENIPUNCTURE       No follow-ups on file.      Jackie Carr MD

## 2023-07-26 LAB
ALBUMIN SERPL-MCNC: 4 G/DL (ref 3.5–5)
ALBUMIN/GLOB SERPL: 1.2 (ref 1.1–2.2)
ALP SERPL-CCNC: 106 U/L (ref 45–117)
ALT SERPL-CCNC: 50 U/L (ref 12–78)
ANION GAP SERPL CALC-SCNC: 4 MMOL/L (ref 5–15)
AST SERPL-CCNC: 23 U/L (ref 15–37)
BILIRUB SERPL-MCNC: 0.7 MG/DL (ref 0.2–1)
BUN SERPL-MCNC: 18 MG/DL (ref 6–20)
BUN/CREAT SERPL: 18 (ref 12–20)
CALCIUM SERPL-MCNC: 9 MG/DL (ref 8.5–10.1)
CHLORIDE SERPL-SCNC: 106 MMOL/L (ref 97–108)
CHOLEST SERPL-MCNC: 135 MG/DL
CO2 SERPL-SCNC: 30 MMOL/L (ref 21–32)
CREAT SERPL-MCNC: 1.02 MG/DL (ref 0.7–1.3)
ERYTHROCYTE [DISTWIDTH] IN BLOOD BY AUTOMATED COUNT: 13.1 % (ref 11.5–14.5)
GLOBULIN SER CALC-MCNC: 3.3 G/DL (ref 2–4)
GLUCOSE SERPL-MCNC: 107 MG/DL (ref 65–100)
HCT VFR BLD AUTO: 48.6 % (ref 36.6–50.3)
HDLC SERPL-MCNC: 41 MG/DL
HDLC SERPL: 3.3 (ref 0–5)
HGB BLD-MCNC: 15.4 G/DL (ref 12.1–17)
LDLC SERPL CALC-MCNC: 66.8 MG/DL (ref 0–100)
MCH RBC QN AUTO: 28.3 PG (ref 26–34)
MCHC RBC AUTO-ENTMCNC: 31.7 G/DL (ref 30–36.5)
MCV RBC AUTO: 89.3 FL (ref 80–99)
NRBC # BLD: 0 K/UL (ref 0–0.01)
NRBC BLD-RTO: 0 PER 100 WBC
PLATELET # BLD AUTO: 263 K/UL (ref 150–400)
PMV BLD AUTO: 10.1 FL (ref 8.9–12.9)
POTASSIUM SERPL-SCNC: 4.9 MMOL/L (ref 3.5–5.1)
PROT SERPL-MCNC: 7.3 G/DL (ref 6.4–8.2)
RBC # BLD AUTO: 5.44 M/UL (ref 4.1–5.7)
SODIUM SERPL-SCNC: 140 MMOL/L (ref 136–145)
TRIGL SERPL-MCNC: 136 MG/DL
VLDLC SERPL CALC-MCNC: 27.2 MG/DL
WBC # BLD AUTO: 9.7 K/UL (ref 4.1–11.1)

## 2023-08-29 ENCOUNTER — TELEPHONE (OUTPATIENT)
Age: 53
End: 2023-08-29

## 2023-08-29 NOTE — TELEPHONE ENCOUNTER
[place] and got a very bad earache and wanted to know what he can do about it. He's asking if something can be called in for him. [facility] [address] [facility], [address]. Patients number is [phone]. This message originally went to Dr Dre Gore.

## 2023-08-29 NOTE — TELEPHONE ENCOUNTER
Patient is [place] of state and got a very bad earache and wanted to know what he can do about it. He's asking if something can be called in for him. His number [phone].

## 2023-09-07 RX ORDER — FAMOTIDINE 40 MG/1
TABLET, FILM COATED ORAL
Qty: 90 TABLET | Refills: 0 | OUTPATIENT
Start: 2023-09-07

## 2023-10-02 ENCOUNTER — INITIAL CONSULT (OUTPATIENT)
Age: 53
End: 2023-10-02
Payer: COMMERCIAL

## 2023-10-02 VITALS
RESPIRATION RATE: 18 BRPM | OXYGEN SATURATION: 97 % | SYSTOLIC BLOOD PRESSURE: 156 MMHG | TEMPERATURE: 98.2 F | BODY MASS INDEX: 39.74 KG/M2 | WEIGHT: 301.2 LBS | HEART RATE: 78 BPM | DIASTOLIC BLOOD PRESSURE: 92 MMHG

## 2023-10-02 DIAGNOSIS — I48.0 PAROXYSMAL ATRIAL FIBRILLATION (HCC): Primary | ICD-10-CM

## 2023-10-02 PROCEDURE — 99204 OFFICE O/P NEW MOD 45 MIN: CPT | Performed by: NURSE PRACTITIONER

## 2023-10-02 ASSESSMENT — PATIENT HEALTH QUESTIONNAIRE - PHQ9
SUM OF ALL RESPONSES TO PHQ QUESTIONS 1-9: 0
2. FEELING DOWN, DEPRESSED OR HOPELESS: 0
SUM OF ALL RESPONSES TO PHQ QUESTIONS 1-9: 0
SUM OF ALL RESPONSES TO PHQ9 QUESTIONS 1 & 2: 0
1. LITTLE INTEREST OR PLEASURE IN DOING THINGS: 0

## 2023-10-02 NOTE — PROGRESS NOTES
CSS   History and Physical    Subjective: Cortney Grant is a 50 y.o. male who was referred for cardiac evaluation by Dr. Zev Kaiser.     Endorses CP (Onset was sudden. Location was mid chest. This was described as lasting for seconds. Pain did not radiate. No associated symptoms. No aggravating factors. No alleviating factors. ), SOB (Onset was with exertion.), sycope (2 occurrences at work 10/2022), fatigue (recurrent), and palpitations (This was intermittent. ). Denies PND, orthopnea, LE edema, claudication, and weight gain >3 lbs in one day or >5 lbs in one week. Past medical/surgical history positive for HLD, SSS s/p PM placement 10/2021, loop recorder implant 5/2020, COPD, and Leos's Esophagus (Protonix). Denies difficulty swallowing, thyroid disease, prior PNA, previous thoracic surgery, trauma or radiation to chest wall, diabetes, kidney disease, blood, blackness or tarriness of stool, prostate disease, frequent UTIs, testosterone or other hormonal use, PAD/PVD , vein stripping, issues with bleeding or blood clots, cancer within the past 5 years, hospitalizations for HF within the past year, and is not considered immunocompromised. Social history positive for alcohol use (2-3 drinks on special occasions). Denies tobacco use and drug use. Family history positive for CAD, MI, CHF, throat and lung cancer. .     Functional status: independent in ADLs. Lives [place] from [facility] with his wife. Patient is a . COVID vaccine status: Unvaccinated. Patient states that he is allergic to surgical glue. Medically managed on Xarelto. Cardiac Testing    Cardiac catheterization:  No results found for this or any previous visit. ECHO:  No valid procedures specified.       Past Medical History:   Diagnosis Date    Acid reflux     Acute pain of right shoulder 1/30/2018    Asthma     Atrial fibrillation ([address]) 2020    Carotid artery disease (HCC)     Class 2 obesity due to excess

## 2023-10-03 PROBLEM — I48.0 PAROXYSMAL ATRIAL FIBRILLATION (HCC): Status: ACTIVE | Noted: 2023-10-03

## 2023-10-04 ENCOUNTER — OFFICE VISIT (OUTPATIENT)
Age: 53
End: 2023-10-04
Payer: COMMERCIAL

## 2023-10-04 VITALS
WEIGHT: 301.6 LBS | RESPIRATION RATE: 20 BRPM | OXYGEN SATURATION: 95 % | BODY MASS INDEX: 39.97 KG/M2 | SYSTOLIC BLOOD PRESSURE: 114 MMHG | HEIGHT: 73 IN | TEMPERATURE: 98.4 F | DIASTOLIC BLOOD PRESSURE: 81 MMHG | HEART RATE: 64 BPM

## 2023-10-04 DIAGNOSIS — K21.00 GASTROESOPHAGEAL REFLUX DISEASE WITH ESOPHAGITIS WITHOUT HEMORRHAGE: ICD-10-CM

## 2023-10-04 DIAGNOSIS — Z91.038 HYMENOPTERA ALLERGY: ICD-10-CM

## 2023-10-04 DIAGNOSIS — N13.8 BPH WITH OBSTRUCTION/LOWER URINARY TRACT SYMPTOMS: ICD-10-CM

## 2023-10-04 DIAGNOSIS — L21.9 SEBORRHEIC DERMATITIS: ICD-10-CM

## 2023-10-04 DIAGNOSIS — N40.1 BPH WITH OBSTRUCTION/LOWER URINARY TRACT SYMPTOMS: ICD-10-CM

## 2023-10-04 DIAGNOSIS — I49.5 SSS (SICK SINUS SYNDROME) (HCC): Primary | ICD-10-CM

## 2023-10-04 DIAGNOSIS — K20.0 EOSINOPHILIC ESOPHAGITIS: ICD-10-CM

## 2023-10-04 DIAGNOSIS — T78.2XXD ANAPHYLAXIS, SUBSEQUENT ENCOUNTER: ICD-10-CM

## 2023-10-04 DIAGNOSIS — I48.0 PAROXYSMAL ATRIAL FIBRILLATION (HCC): ICD-10-CM

## 2023-10-04 DIAGNOSIS — Z95.0 PACEMAKER: ICD-10-CM

## 2023-10-04 DIAGNOSIS — I65.23 BILATERAL CAROTID ARTERY STENOSIS: ICD-10-CM

## 2023-10-04 DIAGNOSIS — E66.01 SEVERE OBESITY (HCC): ICD-10-CM

## 2023-10-04 DIAGNOSIS — E78.2 MIXED DYSLIPIDEMIA: ICD-10-CM

## 2023-10-04 DIAGNOSIS — J45.20 MILD INTERMITTENT ASTHMA WITHOUT COMPLICATION: ICD-10-CM

## 2023-10-04 DIAGNOSIS — J30.9 CHRONIC ALLERGIC RHINITIS: ICD-10-CM

## 2023-10-04 PROBLEM — T78.2XXA ANAPHYLACTIC SYNDROME: Status: ACTIVE | Noted: 2023-10-04

## 2023-10-04 PROCEDURE — 99214 OFFICE O/P EST MOD 30 MIN: CPT | Performed by: FAMILY MEDICINE

## 2023-10-04 RX ORDER — MONTELUKAST SODIUM 10 MG/1
10 TABLET ORAL DAILY
Qty: 90 TABLET | Refills: 1 | Status: SHIPPED | OUTPATIENT
Start: 2023-10-04

## 2023-10-04 RX ORDER — KETOTIFEN FUMARATE 0.35 MG/ML
1 SOLUTION/ DROPS OPHTHALMIC 2 TIMES DAILY PRN
Qty: 10 ML | Refills: 2 | Status: SHIPPED | OUTPATIENT
Start: 2023-10-04

## 2023-10-04 RX ORDER — FLUTICASONE PROPIONATE 50 MCG
SPRAY, SUSPENSION (ML) NASAL
Qty: 16 G | Refills: 2 | Status: SHIPPED | OUTPATIENT
Start: 2023-10-04

## 2023-10-04 RX ORDER — ATORVASTATIN CALCIUM 40 MG/1
40 TABLET, FILM COATED ORAL DAILY
Qty: 90 TABLET | Refills: 1 | Status: SHIPPED | OUTPATIENT
Start: 2023-10-04

## 2023-10-04 RX ORDER — METOPROLOL SUCCINATE 25 MG/1
50 TABLET, EXTENDED RELEASE ORAL DAILY
Qty: 90 TABLET | Refills: 1 | Status: SHIPPED | OUTPATIENT
Start: 2023-10-04

## 2023-10-04 RX ORDER — EPINEPHRINE 0.3 MG/.3ML
INJECTION SUBCUTANEOUS
Qty: 1 EACH | Refills: 0 | Status: SHIPPED | OUTPATIENT
Start: 2023-10-04

## 2023-10-04 RX ORDER — PANTOPRAZOLE SODIUM 40 MG/1
40 TABLET, DELAYED RELEASE ORAL 2 TIMES DAILY
Qty: 90 TABLET | Refills: 1 | Status: SHIPPED | OUTPATIENT
Start: 2023-10-04

## 2023-10-04 RX ORDER — ALBUTEROL SULFATE 90 UG/1
2 AEROSOL, METERED RESPIRATORY (INHALATION) EVERY 4 HOURS PRN
Qty: 48 G | Refills: 1 | Status: SHIPPED | OUTPATIENT
Start: 2023-10-04

## 2023-10-04 SDOH — ECONOMIC STABILITY: INCOME INSECURITY: HOW HARD IS IT FOR YOU TO PAY FOR THE VERY BASICS LIKE FOOD, HOUSING, MEDICAL CARE, AND HEATING?: NOT HARD AT ALL

## 2023-10-04 SDOH — ECONOMIC STABILITY: FOOD INSECURITY: WITHIN THE PAST 12 MONTHS, YOU WORRIED THAT YOUR FOOD WOULD RUN OUT BEFORE YOU GOT MONEY TO BUY MORE.: NEVER TRUE

## 2023-10-04 SDOH — ECONOMIC STABILITY: FOOD INSECURITY: WITHIN THE PAST 12 MONTHS, THE FOOD YOU BOUGHT JUST DIDN'T LAST AND YOU DIDN'T HAVE MONEY TO GET MORE.: NEVER TRUE

## 2023-10-04 ASSESSMENT — ENCOUNTER SYMPTOMS
CONSTIPATION: 0
SINUS PRESSURE: 0
DIARRHEA: 0
COUGH: 0
ABDOMINAL DISTENTION: 0
WHEEZING: 0
CHEST TIGHTNESS: 0
EYE REDNESS: 0
BLOOD IN STOOL: 0
RHINORRHEA: 0
BACK PAIN: 0
SHORTNESS OF BREATH: 0
VOMITING: 0
SINUS PAIN: 0
VOICE CHANGE: 0
EYE PAIN: 0
ABDOMINAL PAIN: 0
ANAL BLEEDING: 0
NAUSEA: 0

## 2023-10-04 ASSESSMENT — ANXIETY QUESTIONNAIRES
2. NOT BEING ABLE TO STOP OR CONTROL WORRYING: 0
4. TROUBLE RELAXING: 0
7. FEELING AFRAID AS IF SOMETHING AWFUL MIGHT HAPPEN: 0
5. BEING SO RESTLESS THAT IT IS HARD TO SIT STILL: 0
GAD7 TOTAL SCORE: 0
6. BECOMING EASILY ANNOYED OR IRRITABLE: 0
IF YOU CHECKED OFF ANY PROBLEMS ON THIS QUESTIONNAIRE, HOW DIFFICULT HAVE THESE PROBLEMS MADE IT FOR YOU TO DO YOUR WORK, TAKE CARE OF THINGS AT HOME, OR GET ALONG WITH OTHER PEOPLE: NOT DIFFICULT AT ALL
3. WORRYING TOO MUCH ABOUT DIFFERENT THINGS: 0
1. FEELING NERVOUS, ANXIOUS, OR ON EDGE: 0

## 2023-10-04 ASSESSMENT — PATIENT HEALTH QUESTIONNAIRE - PHQ9
SUM OF ALL RESPONSES TO PHQ9 QUESTIONS 1 & 2: 0
2. FEELING DOWN, DEPRESSED OR HOPELESS: 0
SUM OF ALL RESPONSES TO PHQ QUESTIONS 1-9: 0
1. LITTLE INTEREST OR PLEASURE IN DOING THINGS: 0

## 2023-10-04 NOTE — PROGRESS NOTES
reviewed. Constitutional:       General: He is not in acute distress. Appearance: Normal appearance. He is obese. He is not ill-appearing. HENT:      Head: Normocephalic and atraumatic. Right Ear: Tympanic membrane, ear canal and external ear normal. There is no impacted cerumen. Left Ear: Tympanic membrane, ear canal and external ear normal. There is no impacted cerumen. Nose: No congestion or rhinorrhea. Mouth/Throat:      Mouth: Mucous membranes are moist.      Pharynx: Oropharynx is clear. No oropharyngeal exudate or posterior oropharyngeal erythema. Eyes:      General: No scleral icterus. Right eye: No discharge. Left eye: No discharge. Extraocular Movements: Extraocular movements intact. Conjunctiva/sclera: Conjunctivae normal.      Pupils: Pupils are equal, round, and reactive to light. Neck:      Vascular: No carotid bruit. Cardiovascular:      Rate and Rhythm: Normal rate and regular rhythm. Pulses: Normal pulses. Heart sounds: Normal heart sounds. No murmur heard. No friction rub. No gallop. Pulmonary:      Effort: Pulmonary effort is normal. No respiratory distress. Breath sounds: Normal breath sounds. No stridor. No wheezing, rhonchi or rales. Chest:      Chest wall: No tenderness. Abdominal:      General: Abdomen is flat. Bowel sounds are normal. There is no distension. Palpations: Abdomen is soft. There is no mass. Tenderness: There is no abdominal tenderness. There is no guarding or rebound. Hernia: No hernia is present. Genitourinary:     Prostate: Normal.      Comments: Prostate is enlarged by history  Musculoskeletal:         General: No swelling, tenderness, deformity or signs of injury. Normal range of motion. Cervical back: No rigidity or tenderness. Right lower leg: No edema. Left lower leg: No edema. Lymphadenopathy:      Cervical: No cervical adenopathy.    Skin:     General:

## 2023-10-09 RX ORDER — FAMOTIDINE 40 MG/1
40 TABLET, FILM COATED ORAL
Qty: 90 TABLET | Refills: 1 | Status: SHIPPED | OUTPATIENT
Start: 2023-10-09

## 2023-10-09 NOTE — TELEPHONE ENCOUNTER
Patient requesting refill on     Requested Prescriptions     Pending Prescriptions Disp Refills    famotidine (PEPCID) 40 MG tablet [Pharmacy Med Name: Famotidine 40 MG Oral Tablet] 90 tablet 0     Sig: Take 1 tablet by mouth nightly        Last OV 10/4/2023

## 2023-11-20 ENCOUNTER — TELEPHONE (OUTPATIENT)
Age: 53
End: 2023-11-20

## 2023-11-20 NOTE — TELEPHONE ENCOUNTER
I returned patient's spouse call to inform her that the authorization was approved after a Peer to Peer done by Dr. Alan Vann and [facility] Director.

## 2023-12-13 ENCOUNTER — HOSPITAL ENCOUNTER (OUTPATIENT)
Facility: HOSPITAL | Age: 53
Discharge: HOME | End: 2023-12-16
Payer: COMMERCIAL

## 2023-12-13 VITALS
SYSTOLIC BLOOD PRESSURE: 125 MMHG | DIASTOLIC BLOOD PRESSURE: 73 MMHG | WEIGHT: 300.93 LBS | OXYGEN SATURATION: 97 % | TEMPERATURE: 98 F | BODY MASS INDEX: 40.76 KG/M2 | HEART RATE: 65 BPM | HEIGHT: 72 IN | RESPIRATION RATE: 18 BRPM

## 2023-12-13 LAB
ABO + RH BLD: NORMAL
ALBUMIN SERPL-MCNC: 3.8 G/DL (ref 3.5–5)
ALBUMIN/GLOB SERPL: 1 (ref 1.1–2.2)
ALP SERPL-CCNC: 122 U/L (ref 45–117)
ALT SERPL-CCNC: 52 U/L (ref 12–78)
ANION GAP SERPL CALC-SCNC: 3 MMOL/L (ref 5–15)
APPEARANCE UR: CLEAR
APTT PPP: 31.4 SEC (ref 22.1–31)
ARTERIAL PATENCY WRIST A: YES
AST SERPL-CCNC: 30 U/L (ref 15–37)
BACTERIA URNS QL MICRO: NEGATIVE /HPF
BASE DEFICIT BLDA-SCNC: 0.5 MMOL/L
BASOPHILS # BLD: 0.1 K/UL (ref 0–0.1)
BASOPHILS NFR BLD: 1 % (ref 0–1)
BDY SITE: NORMAL
BILIRUB SERPL-MCNC: 0.4 MG/DL (ref 0.2–1)
BILIRUB UR QL: NEGATIVE
BLOOD GROUP ANTIBODIES SERPL: NORMAL
BUN SERPL-MCNC: 13 MG/DL (ref 6–20)
BUN/CREAT SERPL: 12 (ref 12–20)
CALCIUM SERPL-MCNC: 9.3 MG/DL (ref 8.5–10.1)
CHLORIDE SERPL-SCNC: 106 MMOL/L (ref 97–108)
CO2 SERPL-SCNC: 31 MMOL/L (ref 21–32)
COLOR UR: NORMAL
CREAT SERPL-MCNC: 1.09 MG/DL (ref 0.7–1.3)
DIFFERENTIAL METHOD BLD: NORMAL
EOSINOPHIL # BLD: 0.4 K/UL (ref 0–0.4)
EOSINOPHIL NFR BLD: 4 % (ref 0–7)
EPITH CASTS URNS QL MICRO: NORMAL /LPF
ERYTHROCYTE [DISTWIDTH] IN BLOOD BY AUTOMATED COUNT: 12.9 % (ref 11.5–14.5)
EST. AVERAGE GLUCOSE BLD GHB EST-MCNC: 117 MG/DL
GLOBULIN SER CALC-MCNC: 3.8 G/DL (ref 2–4)
GLUCOSE SERPL-MCNC: 104 MG/DL (ref 65–100)
GLUCOSE UR STRIP.AUTO-MCNC: NEGATIVE MG/DL
HBA1C MFR BLD: 5.7 % (ref 4–5.6)
HCO3 BLDA-SCNC: 24 MMOL/L (ref 22–26)
HCT VFR BLD AUTO: 46.6 % (ref 36.6–50.3)
HGB BLD-MCNC: 15.4 G/DL (ref 12.1–17)
HGB UR QL STRIP: NEGATIVE
HISTORY CHECK: NORMAL
HYALINE CASTS URNS QL MICRO: NORMAL /LPF (ref 0–2)
IMM GRANULOCYTES # BLD AUTO: 0 K/UL (ref 0–0.04)
IMM GRANULOCYTES NFR BLD AUTO: 0 % (ref 0–0.5)
INR PPP: 1 (ref 0.9–1.1)
KETONES UR QL STRIP.AUTO: NEGATIVE MG/DL
LEUKOCYTE ESTERASE UR QL STRIP.AUTO: NEGATIVE
LYMPHOCYTES # BLD: 2.8 K/UL (ref 0.8–3.5)
LYMPHOCYTES NFR BLD: 26 % (ref 12–49)
MAGNESIUM SERPL-MCNC: 2.1 MG/DL (ref 1.6–2.4)
MCH RBC QN AUTO: 28.9 PG (ref 26–34)
MCHC RBC AUTO-ENTMCNC: 33 G/DL (ref 30–36.5)
MCV RBC AUTO: 87.6 FL (ref 80–99)
MONOCYTES # BLD: 0.7 K/UL (ref 0–1)
MONOCYTES NFR BLD: 7 % (ref 5–13)
NEUTS SEG # BLD: 6.5 K/UL (ref 1.8–8)
NEUTS SEG NFR BLD: 62 % (ref 32–75)
NITRITE UR QL STRIP.AUTO: NEGATIVE
NRBC # BLD: 0 K/UL (ref 0–0.01)
NRBC BLD-RTO: 0 PER 100 WBC
PCO2 BLDA: 38 MMHG (ref 35–45)
PH BLDA: 7.42 (ref 7.35–7.45)
PH UR STRIP: 7.5 (ref 5–8)
PLATELET # BLD AUTO: 257 K/UL (ref 150–400)
PMV BLD AUTO: 8.9 FL (ref 8.9–12.9)
PO2 BLDA: 89 MMHG (ref 80–100)
POTASSIUM SERPL-SCNC: 3.9 MMOL/L (ref 3.5–5.1)
PROT SERPL-MCNC: 7.6 G/DL (ref 6.4–8.2)
PROT UR STRIP-MCNC: NEGATIVE MG/DL
PROTHROMBIN TIME: 10.7 SEC (ref 9–11.1)
RBC # BLD AUTO: 5.32 M/UL (ref 4.1–5.7)
RBC #/AREA URNS HPF: NORMAL /HPF (ref 0–5)
SAO2 % BLD: 97 % (ref 92–97)
SAO2% DEVICE SAO2% SENSOR NAME: NORMAL
SODIUM SERPL-SCNC: 140 MMOL/L (ref 136–145)
SP GR UR REFRACTOMETRY: 1.01
SPECIMEN EXP DATE BLD: NORMAL
SPECIMEN SITE: NORMAL
THERAPEUTIC RANGE: ABNORMAL SECS (ref 58–77)
TSH SERPL DL<=0.05 MIU/L-ACNC: 1.23 UIU/ML (ref 0.36–3.74)
URINE CULTURE IF INDICATED: NORMAL
UROBILINOGEN UR QL STRIP.AUTO: 0.2 EU/DL (ref 0.2–1)
WBC # BLD AUTO: 10.5 K/UL (ref 4.1–11.1)
WBC URNS QL MICRO: NORMAL /HPF (ref 0–4)

## 2023-12-13 PROCEDURE — 80053 COMPREHEN METABOLIC PANEL: CPT

## 2023-12-13 PROCEDURE — 36415 COLL VENOUS BLD VENIPUNCTURE: CPT

## 2023-12-13 PROCEDURE — 81001 URINALYSIS AUTO W/SCOPE: CPT

## 2023-12-13 PROCEDURE — 84443 ASSAY THYROID STIM HORMONE: CPT

## 2023-12-13 PROCEDURE — 85730 THROMBOPLASTIN TIME PARTIAL: CPT

## 2023-12-13 PROCEDURE — 87635 SARS-COV-2 COVID-19 AMP PRB: CPT

## 2023-12-13 PROCEDURE — 36600 WITHDRAWAL OF ARTERIAL BLOOD: CPT

## 2023-12-13 PROCEDURE — 82803 BLOOD GASES ANY COMBINATION: CPT

## 2023-12-13 PROCEDURE — 86850 RBC ANTIBODY SCREEN: CPT

## 2023-12-13 PROCEDURE — 86900 BLOOD TYPING SEROLOGIC ABO: CPT

## 2023-12-13 PROCEDURE — 86901 BLOOD TYPING SEROLOGIC RH(D): CPT

## 2023-12-13 PROCEDURE — 85025 COMPLETE CBC W/AUTO DIFF WBC: CPT

## 2023-12-13 PROCEDURE — 85610 PROTHROMBIN TIME: CPT

## 2023-12-13 PROCEDURE — 83735 ASSAY OF MAGNESIUM: CPT

## 2023-12-13 PROCEDURE — 83036 HEMOGLOBIN GLYCOSYLATED A1C: CPT

## 2023-12-13 PROCEDURE — 71046 X-RAY EXAM CHEST 2 VIEWS: CPT

## 2023-12-13 RX ORDER — CHLORHEXIDINE GLUCONATE ORAL RINSE 1.2 MG/ML
15 SOLUTION DENTAL 2 TIMES DAILY
Qty: 420 ML | Refills: 0 | Status: SHIPPED | OUTPATIENT
Start: 2023-12-13 | End: 2023-12-27

## 2023-12-13 RX ORDER — SODIUM CHLORIDE, SODIUM LACTATE, POTASSIUM CHLORIDE, CALCIUM CHLORIDE 600; 310; 30; 20 MG/100ML; MG/100ML; MG/100ML; MG/100ML
INJECTION, SOLUTION INTRAVENOUS CONTINUOUS
OUTPATIENT
Start: 2023-12-20

## 2023-12-13 RX ORDER — ENOXAPARIN SODIUM 150 MG/ML
1 INJECTION SUBCUTANEOUS ONCE
Qty: 1 ML | Refills: 0 | Status: SHIPPED | OUTPATIENT
Start: 2023-12-18 | End: 2023-12-18

## 2023-12-13 NOTE — PERIOP NOTE

## 2023-12-14 LAB
EKG ATRIAL RATE: 61 BPM
EKG DIAGNOSIS: NORMAL
EKG P AXIS: 21 DEGREES
EKG P-R INTERVAL: 106 MS
EKG Q-T INTERVAL: 406 MS
EKG QRS DURATION: 84 MS
EKG QTC CALCULATION (BAZETT): 408 MS
EKG R AXIS: -3 DEGREES
EKG T AXIS: 10 DEGREES
EKG VENTRICULAR RATE: 61 BPM
SARS-COV-2 RNA RESP QL NAA+PROBE: NOT DETECTED
SOURCE: NORMAL

## 2023-12-15 ENCOUNTER — CLINICAL DOCUMENTATION (OUTPATIENT)
Age: 53
End: 2023-12-15

## 2023-12-20 PROBLEM — Z98.890 S/P ABLATION OF ATRIAL FIBRILLATION: Status: ACTIVE | Noted: 2023-12-20

## 2023-12-20 PROBLEM — Z86.79 S/P ABLATION OF ATRIAL FIBRILLATION: Status: ACTIVE | Noted: 2023-12-20

## 2023-12-20 PROBLEM — I48.91 A-FIB (HCC): Status: ACTIVE | Noted: 2023-12-20

## 2023-12-27 ENCOUNTER — TELEPHONE (OUTPATIENT)
Age: 53
End: 2023-12-27

## 2023-12-27 NOTE — TELEPHONE ENCOUNTER
Cardiac Surgery Discharge - Follow up call placed to Holland Beth to review his plan of care upon discharge. Medications were reviewed including med purpose, timing and dosage. He said he is feeling well but is having soreness at his incision. He is taking Ibuprofen which has been effective. We discussed the importance of daily showers and cleansing his incision 2 times a day. The patient was also instructed to increase  walking activity daily. Confirmed follow up appointment for 12/29. Holland Beth was without questions and was instructed to contact this office with any concerns.  Eze Rodriguez RN

## 2023-12-29 ENCOUNTER — OFFICE VISIT (OUTPATIENT)
Age: 53
End: 2023-12-29

## 2023-12-29 DIAGNOSIS — Z86.79 S/P ABLATION OF ATRIAL FIBRILLATION: Primary | ICD-10-CM

## 2023-12-29 DIAGNOSIS — Z98.890 S/P ABLATION OF ATRIAL FIBRILLATION: Primary | ICD-10-CM

## 2023-12-29 PROCEDURE — 99024 POSTOP FOLLOW-UP VISIT: CPT | Performed by: PHYSICIAN ASSISTANT

## 2024-01-10 ENCOUNTER — OFFICE VISIT (OUTPATIENT)
Age: 54
End: 2024-01-10

## 2024-01-10 DIAGNOSIS — G62.9 NEUROPATHY: Primary | ICD-10-CM

## 2024-01-10 PROCEDURE — 99024 POSTOP FOLLOW-UP VISIT: CPT | Performed by: NURSE PRACTITIONER

## 2024-01-10 RX ORDER — GABAPENTIN 300 MG/1
300 CAPSULE ORAL 3 TIMES DAILY
Qty: 42 CAPSULE | Refills: 0 | Status: SHIPPED | OUTPATIENT
Start: 2024-01-10 | End: 2024-01-24

## 2024-01-10 RX ORDER — CEPHALEXIN 500 MG/1
500 CAPSULE ORAL 4 TIMES DAILY
Qty: 28 CAPSULE | Refills: 0 | Status: SHIPPED | OUTPATIENT
Start: 2024-01-10 | End: 2024-01-17

## 2024-01-10 NOTE — PROGRESS NOTES
Patient: David Delgadoingoziel   Age: 53 y.o.     Patient Care Team:  Julio Arriaga Jr., MD as PCP - General  Julio Arriaga Jr., [facility] P[facility]  Vasu Leyva MD as Physician    Diagnosis: The encounter diagnosis was Neuropathy.    Problem List:   Patient Active Problem List   Diagnosis    Bilateral carotid artery stenosis    Mild intermittent asthma without complication    SSS (sick sinus syndrome) (HCC)    Bloating    Severe obesity (HCC)    Cervical radiculopathy at C7    Gastroesophageal reflux disease    Hymenoptera allergy    BPH with obstruction/lower urinary tract symptoms    Eosinophilic esophagitis    Seborrheic dermatitis    Mixed dyslipidemia    Pacemaker    Paroxysmal atrial fibrillation (HCC)    Chronic allergic rhinitis    Anaphylactic syndrome    A-fib (HCC)    S/P ablation of atrial fibrillation          Date of Surgery: 12/20/23      Surgery: TRANSPARICARDIAL HYBRID ABLATION AND LEFT ATRIAL APPENDAGE CLIP VIA VIDEO ASSISTED THORACOSCOPIC SURGERY     HPI:  Pt had a TRANSPARICARDIAL HYBRID ABLATION AND LEFT ATRIAL APPENDAGE CLIP VIA VIDEO ASSISTED THORACOSCOPIC SURGERY  on 12/20/23 . Post-op course uncomplicated and patient discharged home on POD2.     Pt was being seen at Dr. Leyva's office and they called to report possible wound infection. Pt reports he had a low grade temp 100.4 yesteday. Denies other s/s infection. Denies redness/drainage. He denies dysuria. He also complains of significant pain that he describes as pain that runs along his rib and sometimes \"zings\" him.     Current Medications:   Current Outpatient Medications   Medication Sig Dispense Refill    cephALEXin (KEFLEX) 500 MG capsule Take 1 capsule by mouth 4 times daily for 7 days 28 capsule 0    gabapentin (NEURONTIN) 300 MG capsule Take 1 capsule by mouth 3 times daily for 14 days. Max Daily Amount: 900 mg 42 capsule 0    polyethylene glycol (GLYCOLAX) 17 g packet Take 1 packet by mouth daily 14

## 2024-01-11 ENCOUNTER — CLINICAL DOCUMENTATION (OUTPATIENT)
Age: 54
End: 2024-01-11

## 2024-01-11 NOTE — PROGRESS NOTES
Pt of Dr. Arriaga seen by Gastro 1/5/24 for hospital follow up for abd pain, cT scan was normal, gastro  says was just a virus.

## 2024-04-10 ENCOUNTER — OFFICE VISIT (OUTPATIENT)
Age: 54
End: 2024-04-10
Payer: COMMERCIAL

## 2024-04-10 VITALS
RESPIRATION RATE: 18 BRPM | WEIGHT: 309 LBS | BODY MASS INDEX: 41.85 KG/M2 | TEMPERATURE: 98.4 F | HEIGHT: 72 IN | DIASTOLIC BLOOD PRESSURE: 84 MMHG | HEART RATE: 77 BPM | SYSTOLIC BLOOD PRESSURE: 133 MMHG | OXYGEN SATURATION: 96 %

## 2024-04-10 DIAGNOSIS — I48.0 PAROXYSMAL ATRIAL FIBRILLATION (HCC): ICD-10-CM

## 2024-04-10 DIAGNOSIS — E78.00 HYPERCHOLESTEROLEMIA: ICD-10-CM

## 2024-04-10 DIAGNOSIS — E66.01 SEVERE OBESITY (HCC): ICD-10-CM

## 2024-04-10 DIAGNOSIS — N13.8 BPH WITH OBSTRUCTION/LOWER URINARY TRACT SYMPTOMS: ICD-10-CM

## 2024-04-10 DIAGNOSIS — K21.00 GASTROESOPHAGEAL REFLUX DISEASE WITH ESOPHAGITIS WITHOUT HEMORRHAGE: ICD-10-CM

## 2024-04-10 DIAGNOSIS — I48.3 TYPICAL ATRIAL FLUTTER (HCC): ICD-10-CM

## 2024-04-10 DIAGNOSIS — K20.0 EOSINOPHILIC ESOPHAGITIS: ICD-10-CM

## 2024-04-10 DIAGNOSIS — E78.2 MIXED DYSLIPIDEMIA: ICD-10-CM

## 2024-04-10 DIAGNOSIS — I49.5 SSS (SICK SINUS SYNDROME) (HCC): ICD-10-CM

## 2024-04-10 DIAGNOSIS — J45.21 MILD INTERMITTENT ASTHMA WITH ACUTE EXACERBATION: Primary | ICD-10-CM

## 2024-04-10 DIAGNOSIS — N40.1 BPH WITH OBSTRUCTION/LOWER URINARY TRACT SYMPTOMS: ICD-10-CM

## 2024-04-10 DIAGNOSIS — J30.9 CHRONIC ALLERGIC RHINITIS: ICD-10-CM

## 2024-04-10 DIAGNOSIS — N40.0 BENIGN PROSTATIC HYPERPLASIA WITHOUT LOWER URINARY TRACT SYMPTOMS: ICD-10-CM

## 2024-04-10 DIAGNOSIS — J45.20 MILD INTERMITTENT ASTHMA WITHOUT COMPLICATION: ICD-10-CM

## 2024-04-10 PROCEDURE — 99214 OFFICE O/P EST MOD 30 MIN: CPT | Performed by: FAMILY MEDICINE

## 2024-04-10 RX ORDER — ATORVASTATIN CALCIUM 40 MG/1
40 TABLET, FILM COATED ORAL DAILY
Qty: 90 TABLET | Refills: 1 | Status: SHIPPED | OUTPATIENT
Start: 2024-04-10

## 2024-04-10 RX ORDER — MONTELUKAST SODIUM 10 MG/1
10 TABLET ORAL DAILY
Qty: 90 TABLET | Refills: 1 | Status: SHIPPED | OUTPATIENT
Start: 2024-04-10

## 2024-04-10 RX ORDER — FLUTICASONE PROPIONATE AND SALMETEROL XINAFOATE 115; 21 UG/1; UG/1
2 AEROSOL, METERED RESPIRATORY (INHALATION) 2 TIMES DAILY
Qty: 1 EACH | Refills: 5 | Status: SHIPPED | OUTPATIENT
Start: 2024-04-10

## 2024-04-10 RX ORDER — PANTOPRAZOLE SODIUM 40 MG/1
40 TABLET, DELAYED RELEASE ORAL 2 TIMES DAILY
Qty: 90 TABLET | Refills: 1 | Status: SHIPPED | OUTPATIENT
Start: 2024-04-10

## 2024-04-10 RX ORDER — FLUTICASONE PROPIONATE 50 MCG
SPRAY, SUSPENSION (ML) NASAL
Qty: 16 G | Refills: 5 | Status: SHIPPED | OUTPATIENT
Start: 2024-04-10

## 2024-04-10 RX ORDER — METOPROLOL SUCCINATE 25 MG/1
50 TABLET, EXTENDED RELEASE ORAL DAILY
Qty: 90 TABLET | Refills: 1 | Status: SHIPPED | OUTPATIENT
Start: 2024-04-10

## 2024-04-10 RX ORDER — ALBUTEROL SULFATE 90 UG/1
2 AEROSOL, METERED RESPIRATORY (INHALATION) EVERY 4 HOURS PRN
Qty: 48 G | Refills: 5 | Status: SHIPPED | OUTPATIENT
Start: 2024-04-10

## 2024-04-10 SDOH — ECONOMIC STABILITY: FOOD INSECURITY: WITHIN THE PAST 12 MONTHS, THE FOOD YOU BOUGHT JUST DIDN'T LAST AND YOU DIDN'T HAVE MONEY TO GET MORE.: NEVER TRUE

## 2024-04-10 SDOH — ECONOMIC STABILITY: INCOME INSECURITY: HOW HARD IS IT FOR YOU TO PAY FOR THE VERY BASICS LIKE FOOD, HOUSING, MEDICAL CARE, AND HEATING?: NOT HARD AT ALL

## 2024-04-10 SDOH — ECONOMIC STABILITY: FOOD INSECURITY: WITHIN THE PAST 12 MONTHS, YOU WORRIED THAT YOUR FOOD WOULD RUN OUT BEFORE YOU GOT MONEY TO BUY MORE.: NEVER TRUE

## 2024-04-10 ASSESSMENT — ENCOUNTER SYMPTOMS
WHEEZING: 1
COUGH: 1
SINUS PAIN: 0
ANAL BLEEDING: 0
SINUS PRESSURE: 0
CONSTIPATION: 0
EYE REDNESS: 0
NAUSEA: 0
BACK PAIN: 0
ABDOMINAL DISTENTION: 0
ABDOMINAL PAIN: 0
DIARRHEA: 0
VOICE CHANGE: 0
CHEST TIGHTNESS: 0
RHINORRHEA: 1
EYE PAIN: 0
BLOOD IN STOOL: 0
VOMITING: 0
SHORTNESS OF BREATH: 1

## 2024-04-10 ASSESSMENT — PATIENT HEALTH QUESTIONNAIRE - PHQ9
SUM OF ALL RESPONSES TO PHQ QUESTIONS 1-9: 0
SUM OF ALL RESPONSES TO PHQ9 QUESTIONS 1 & 2: 0
2. FEELING DOWN, DEPRESSED OR HOPELESS: NOT AT ALL
SUM OF ALL RESPONSES TO PHQ QUESTIONS 1-9: 0
SUM OF ALL RESPONSES TO PHQ QUESTIONS 1-9: 0
1. LITTLE INTEREST OR PLEASURE IN DOING THINGS: NOT AT ALL
SUM OF ALL RESPONSES TO PHQ QUESTIONS 1-9: 0

## 2024-04-10 ASSESSMENT — ANXIETY QUESTIONNAIRES
1. FEELING NERVOUS, ANXIOUS, OR ON EDGE: NOT AT ALL
5. BEING SO RESTLESS THAT IT IS HARD TO SIT STILL: NOT AT ALL
4. TROUBLE RELAXING: NOT AT ALL
2. NOT BEING ABLE TO STOP OR CONTROL WORRYING: NOT AT ALL
3. WORRYING TOO MUCH ABOUT DIFFERENT THINGS: NOT AT ALL
6. BECOMING EASILY ANNOYED OR IRRITABLE: NOT AT ALL
IF YOU CHECKED OFF ANY PROBLEMS ON THIS QUESTIONNAIRE, HOW DIFFICULT HAVE THESE PROBLEMS MADE IT FOR YOU TO DO YOUR WORK, TAKE CARE OF THINGS AT HOME, OR GET ALONG WITH OTHER PEOPLE: NOT DIFFICULT AT ALL
7. FEELING AFRAID AS IF SOMETHING AWFUL MIGHT HAPPEN: NOT AT ALL
GAD7 TOTAL SCORE: 0

## 2024-04-10 NOTE — PROGRESS NOTES
David Klein is a 53 y.o. male who presents with the following:  Chief Complaint   Patient presents with    Hypertension    Wrist Pain     Left wrist pain x 2 weeks    Heart Problem     Sick sinus syndrome and paroxysmal atrial fibrillation    Benign Prostatic Hypertrophy    Breathing Problem     Asthma    Cholesterol Problem    Gastroesophageal Reflux     With severe eosinophilic esophagitis       The patient states that his pacemaker is doing a good job in controlling his sick sinus syndrome and the patient does have bilateral carotid stenosis but has had no difficulties with TIAs.  Patient does have a history of paroxysmal atrial fibrillation but seems to be doing well after his ablation treatment and I understand that he has had a Watchman put in place.  Patient does have mixed dyslipidemia but is not having any difficulties from it at this point in time and the atorvastatin is not causing any muscle pain or weakness.  His Protonix is causing his esophagus not to have any pain so he has no burning chest pain and seems to be taking care of his symptoms from his esophagus and its eosinophilic esophagitis.  The patient's chronic allergic rhinitis is doing fairly well but the pollen is giving him a rough time of it otherwise and he states that his Zaditor that he gets OTC is doing fairly well although he has been having some difficulty with wheezing and chest tightness from his asthma.  Patient states that his BPH is not giving him any difficulties at this time as in the past that has given him some lower urinary tract symptomatology.        Allergies   Allergen Reactions    Cyanoacrylate Dermatitis    Hornet Venom Swelling     Carries Epi pen    Adhesive Tape Rash     Dermabond       Current Outpatient Medications   Medication Sig Dispense Refill    fluticasone-salmeterol (ADVAIR HFA) 115-21 MCG/ACT inhaler Inhale 2 puffs into the lungs 2 times daily 1 each 5    albuterol sulfate HFA

## 2024-04-11 LAB
ALBUMIN SERPL-MCNC: 4 G/DL (ref 3.5–5)
ALBUMIN/GLOB SERPL: 1.3 (ref 1.1–2.2)
ALP SERPL-CCNC: 116 U/L (ref 45–117)
ALT SERPL-CCNC: 45 U/L (ref 12–78)
ANION GAP SERPL CALC-SCNC: 7 MMOL/L (ref 5–15)
AST SERPL-CCNC: 28 U/L (ref 15–37)
BILIRUB SERPL-MCNC: 0.4 MG/DL (ref 0.2–1)
BUN SERPL-MCNC: 20 MG/DL (ref 6–20)
BUN/CREAT SERPL: 17 (ref 12–20)
CALCIUM SERPL-MCNC: 10 MG/DL (ref 8.5–10.1)
CHLORIDE SERPL-SCNC: 109 MMOL/L (ref 97–108)
CHOLEST SERPL-MCNC: 177 MG/DL
CO2 SERPL-SCNC: 28 MMOL/L (ref 21–32)
CREAT SERPL-MCNC: 1.2 MG/DL (ref 0.7–1.3)
ERYTHROCYTE [DISTWIDTH] IN BLOOD BY AUTOMATED COUNT: 12.7 % (ref 11.5–14.5)
GLOBULIN SER CALC-MCNC: 3.2 G/DL (ref 2–4)
GLUCOSE SERPL-MCNC: 125 MG/DL (ref 65–100)
HCT VFR BLD AUTO: 48.9 % (ref 36.6–50.3)
HDLC SERPL-MCNC: 40 MG/DL
HDLC SERPL: 4.4 (ref 0–5)
HGB BLD-MCNC: 15.6 G/DL (ref 12.1–17)
LDLC SERPL CALC-MCNC: 94.6 MG/DL (ref 0–100)
MCH RBC QN AUTO: 28.1 PG (ref 26–34)
MCHC RBC AUTO-ENTMCNC: 31.9 G/DL (ref 30–36.5)
MCV RBC AUTO: 87.9 FL (ref 80–99)
NRBC # BLD: 0 K/UL (ref 0–0.01)
NRBC BLD-RTO: 0 PER 100 WBC
PLATELET # BLD AUTO: 268 K/UL (ref 150–400)
PMV BLD AUTO: 10.3 FL (ref 8.9–12.9)
POTASSIUM SERPL-SCNC: 4.9 MMOL/L (ref 3.5–5.1)
PROT SERPL-MCNC: 7.2 G/DL (ref 6.4–8.2)
RBC # BLD AUTO: 5.56 M/UL (ref 4.1–5.7)
SODIUM SERPL-SCNC: 144 MMOL/L (ref 136–145)
TRIGL SERPL-MCNC: 212 MG/DL
VLDLC SERPL CALC-MCNC: 42.4 MG/DL
WBC # BLD AUTO: 8.4 K/UL (ref 4.1–11.1)

## 2024-08-12 DIAGNOSIS — K20.0 EOSINOPHILIC ESOPHAGITIS: ICD-10-CM

## 2024-08-12 DIAGNOSIS — K21.00 GASTROESOPHAGEAL REFLUX DISEASE WITH ESOPHAGITIS WITHOUT HEMORRHAGE: ICD-10-CM

## 2024-08-13 RX ORDER — PANTOPRAZOLE SODIUM 40 MG/1
40 TABLET, DELAYED RELEASE ORAL 2 TIMES DAILY
Qty: 90 TABLET | Refills: 0 | Status: SHIPPED | OUTPATIENT
Start: 2024-08-13

## 2024-09-25 DIAGNOSIS — K21.00 GASTROESOPHAGEAL REFLUX DISEASE WITH ESOPHAGITIS WITHOUT HEMORRHAGE: ICD-10-CM

## 2024-09-25 DIAGNOSIS — K20.0 EOSINOPHILIC ESOPHAGITIS: ICD-10-CM

## 2024-09-25 RX ORDER — PANTOPRAZOLE SODIUM 40 MG/1
40 TABLET, DELAYED RELEASE ORAL 2 TIMES DAILY
Qty: 90 TABLET | Refills: 0 | Status: SHIPPED | OUTPATIENT
Start: 2024-09-25

## 2024-10-28 ENCOUNTER — OFFICE VISIT (OUTPATIENT)
Age: 54
End: 2024-10-28
Payer: COMMERCIAL

## 2024-10-28 VITALS
TEMPERATURE: 97.5 F | DIASTOLIC BLOOD PRESSURE: 74 MMHG | OXYGEN SATURATION: 97 % | RESPIRATION RATE: 18 BRPM | HEIGHT: 72 IN | WEIGHT: 303.38 LBS | BODY MASS INDEX: 41.09 KG/M2 | HEART RATE: 74 BPM | SYSTOLIC BLOOD PRESSURE: 116 MMHG

## 2024-10-28 DIAGNOSIS — I49.5 SSS (SICK SINUS SYNDROME) (HCC): ICD-10-CM

## 2024-10-28 DIAGNOSIS — J30.9 CHRONIC ALLERGIC RHINITIS: ICD-10-CM

## 2024-10-28 DIAGNOSIS — T78.2XXD ANAPHYLAXIS, SUBSEQUENT ENCOUNTER: ICD-10-CM

## 2024-10-28 DIAGNOSIS — R73.09 ELEVATED GLUCOSE: ICD-10-CM

## 2024-10-28 DIAGNOSIS — I48.0 PAROXYSMAL ATRIAL FIBRILLATION (HCC): ICD-10-CM

## 2024-10-28 DIAGNOSIS — K21.00 GASTROESOPHAGEAL REFLUX DISEASE WITH ESOPHAGITIS WITHOUT HEMORRHAGE: ICD-10-CM

## 2024-10-28 DIAGNOSIS — J45.20 MILD INTERMITTENT ASTHMA WITHOUT COMPLICATION: ICD-10-CM

## 2024-10-28 DIAGNOSIS — J45.21 MILD INTERMITTENT ASTHMA WITH ACUTE EXACERBATION: ICD-10-CM

## 2024-10-28 DIAGNOSIS — N40.0 BENIGN PROSTATIC HYPERPLASIA WITHOUT LOWER URINARY TRACT SYMPTOMS: ICD-10-CM

## 2024-10-28 DIAGNOSIS — R06.02 SHORTNESS OF BREATH: ICD-10-CM

## 2024-10-28 DIAGNOSIS — E78.2 MIXED DYSLIPIDEMIA: ICD-10-CM

## 2024-10-28 DIAGNOSIS — E78.00 HYPERCHOLESTEROLEMIA: Primary | ICD-10-CM

## 2024-10-28 PROCEDURE — 99214 OFFICE O/P EST MOD 30 MIN: CPT | Performed by: NURSE PRACTITIONER

## 2024-10-28 RX ORDER — METOPROLOL SUCCINATE 25 MG/1
50 TABLET, EXTENDED RELEASE ORAL DAILY
Qty: 180 TABLET | Refills: 1 | Status: SHIPPED | OUTPATIENT
Start: 2024-10-28

## 2024-10-28 RX ORDER — FLUTICASONE PROPIONATE AND SALMETEROL XINAFOATE 115; 21 UG/1; UG/1
2 AEROSOL, METERED RESPIRATORY (INHALATION) 2 TIMES DAILY
Qty: 1 EACH | Refills: 5 | Status: SHIPPED | OUTPATIENT
Start: 2024-10-28

## 2024-10-28 RX ORDER — MONTELUKAST SODIUM 10 MG/1
10 TABLET ORAL DAILY
Qty: 90 TABLET | Refills: 1 | Status: SHIPPED | OUTPATIENT
Start: 2024-10-28

## 2024-10-28 RX ORDER — PANTOPRAZOLE SODIUM 40 MG/1
40 TABLET, DELAYED RELEASE ORAL 2 TIMES DAILY
Qty: 180 TABLET | Refills: 1 | Status: SHIPPED | OUTPATIENT
Start: 2024-10-28

## 2024-10-28 RX ORDER — EPINEPHRINE 0.3 MG/.3ML
INJECTION SUBCUTANEOUS
Qty: 1 EACH | Refills: 0 | Status: SHIPPED | OUTPATIENT
Start: 2024-10-28

## 2024-10-28 RX ORDER — FLUTICASONE PROPIONATE 50 MCG
SPRAY, SUSPENSION (ML) NASAL
Qty: 16 G | Refills: 5 | Status: SHIPPED | OUTPATIENT
Start: 2024-10-28

## 2024-10-28 RX ORDER — ATORVASTATIN CALCIUM 40 MG/1
40 TABLET, FILM COATED ORAL DAILY
Qty: 90 TABLET | Refills: 1 | Status: SHIPPED | OUTPATIENT
Start: 2024-10-28

## 2024-10-28 RX ORDER — ALBUTEROL SULFATE 90 UG/1
2 INHALANT RESPIRATORY (INHALATION) EVERY 4 HOURS PRN
Qty: 48 G | Refills: 5 | Status: SHIPPED | OUTPATIENT
Start: 2024-10-28

## 2024-10-28 ASSESSMENT — PATIENT HEALTH QUESTIONNAIRE - PHQ9
SUM OF ALL RESPONSES TO PHQ9 QUESTIONS 1 & 2: 0
2. FEELING DOWN, DEPRESSED OR HOPELESS: NOT AT ALL
SUM OF ALL RESPONSES TO PHQ QUESTIONS 1-9: 0
1. LITTLE INTEREST OR PLEASURE IN DOING THINGS: NOT AT ALL
SUM OF ALL RESPONSES TO PHQ QUESTIONS 1-9: 0

## 2024-10-28 ASSESSMENT — ENCOUNTER SYMPTOMS
COLOR CHANGE: 0
COUGH: 0
ABDOMINAL DISTENTION: 0
EYE DISCHARGE: 0
SHORTNESS OF BREATH: 1

## 2024-10-28 NOTE — PROGRESS NOTES
Labs drawn in left arm per adolfo's orders.  Patient tolerated well.  
\"Have you been to the ER, urgent care clinic since your last visit?  Hospitalized since your last visit?\"    YES - When: approximately 2  weeks ago.  Where and Why: urgent care.    “Have you seen or consulted any other health care providers outside our system since your last visit?”    NO           
Refill: 5    SSS (sick sinus syndrome) ([facility])  Continue metoprolol.  Follow-up with cardiology as planned.  Heart rate stable today  - metoprolol succinate (TOPROL XL) 25 MG extended release tablet; Take 2 tablets by mouth daily  Dispense: 180 tablet; Refill: 1    Gastroesophageal reflux disease with esophagitis without hemorrhage  Continue Protonix.  - pantoprazole (PROTONIX) 40 MG tablet; Take 1 tablet by mouth 2 times daily  Dispense: 180 tablet; Refill: 1        Return in about 6 months (around 4/28/2025) for medication follow up, labs, blood pressure check.       Subjective:  David Klein is a 53 y.o. male here today for follow-up.    He drives a seafood truck for work.  Previously a patient with another provider in the office who is since retired.  He reports a history of A-fib and sick sinus syndrome.  He is taking metoprolol 50 mg extended release daily.  He is followed by cardiology.  Has a pacemaker.  Dr. Mckeon completed an ablation and Watchman in 12/2023.  He is also followed by Dr. Leyva.  Ever since the ablation and watchman he does report some left sided pain with occasional shortness of breath.  His vitals are stable today and he denies a cough.  He is due for repeat labs.  He is taking 40 mg atorvastatin daily.  Tolerating well.  He is not fasting today.  Lab Results   Component Value Date    CHOL 177 04/10/2024    TRIG 212 (H) 04/10/2024    HDL 40 04/10/2024    LDL 94.6 04/10/2024    VLDL 42.4 04/10/2024    CHOLHDLRATIO 4.4 04/10/2024   Asthma and allergies  He recently had a bout of bronchitis.  Seen with [facility].  Was given a prescription for albuterol.  Doing well.  Denies wheezing.  Uses Advair daily and albuterol as needed.  He does take Singulair 10 mg daily.  He also uses Zaditor eyedrops which he uses over-the-counter.    GERD  Taking Protonix 40 mg twice daily.  Requesting a refill.  States this is working well.    BPH  No problems today.  Due for another PSA check.    Of note,

## 2024-10-29 LAB
ALBUMIN SERPL-MCNC: 3.7 G/DL (ref 3.5–5)
ALBUMIN/GLOB SERPL: 1.2 (ref 1.1–2.2)
ALP SERPL-CCNC: 115 U/L (ref 45–117)
ALT SERPL-CCNC: 40 U/L (ref 12–78)
ANION GAP SERPL CALC-SCNC: 4 MMOL/L (ref 2–12)
AST SERPL-CCNC: 23 U/L (ref 15–37)
BASOPHILS # BLD: 0 K/UL (ref 0–0.1)
BASOPHILS NFR BLD: 0 % (ref 0–1)
BILIRUB SERPL-MCNC: 0.5 MG/DL (ref 0.2–1)
BUN SERPL-MCNC: 12 MG/DL (ref 6–20)
BUN/CREAT SERPL: 9 (ref 12–20)
CALCIUM SERPL-MCNC: 8.8 MG/DL (ref 8.5–10.1)
CHLORIDE SERPL-SCNC: 105 MMOL/L (ref 97–108)
CHOLEST SERPL-MCNC: 159 MG/DL
CO2 SERPL-SCNC: 30 MMOL/L (ref 21–32)
CREAT SERPL-MCNC: 1.31 MG/DL (ref 0.7–1.3)
DIFFERENTIAL METHOD BLD: ABNORMAL
EOSINOPHIL # BLD: 0.5 K/UL (ref 0–0.4)
EOSINOPHIL NFR BLD: 5 % (ref 0–7)
ERYTHROCYTE [DISTWIDTH] IN BLOOD BY AUTOMATED COUNT: 12.8 % (ref 11.5–14.5)
EST. AVERAGE GLUCOSE BLD GHB EST-MCNC: 134 MG/DL
GLOBULIN SER CALC-MCNC: 3.2 G/DL (ref 2–4)
GLUCOSE SERPL-MCNC: 135 MG/DL (ref 65–100)
HBA1C MFR BLD: 6.3 % (ref 4–5.6)
HCT VFR BLD AUTO: 44.4 % (ref 36.6–50.3)
HDLC SERPL-MCNC: 41 MG/DL
HDLC SERPL: 3.9 (ref 0–5)
HGB BLD-MCNC: 14.6 G/DL (ref 12.1–17)
IMM GRANULOCYTES # BLD AUTO: 0 K/UL (ref 0–0.04)
IMM GRANULOCYTES NFR BLD AUTO: 0 % (ref 0–0.5)
LDLC SERPL CALC-MCNC: 55.8 MG/DL (ref 0–100)
LYMPHOCYTES # BLD: 3 K/UL (ref 0.8–3.5)
LYMPHOCYTES NFR BLD: 31 % (ref 12–49)
MCH RBC QN AUTO: 28.8 PG (ref 26–34)
MCHC RBC AUTO-ENTMCNC: 32.9 G/DL (ref 30–36.5)
MCV RBC AUTO: 87.6 FL (ref 80–99)
MONOCYTES # BLD: 0.6 K/UL (ref 0–1)
MONOCYTES NFR BLD: 6 % (ref 5–13)
NEUTS SEG # BLD: 5.6 K/UL (ref 1.8–8)
NEUTS SEG NFR BLD: 58 % (ref 32–75)
NRBC # BLD: 0 K/UL (ref 0–0.01)
NRBC BLD-RTO: 0 PER 100 WBC
PLATELET # BLD AUTO: 225 K/UL (ref 150–400)
PMV BLD AUTO: 10.3 FL (ref 8.9–12.9)
POTASSIUM SERPL-SCNC: 4.2 MMOL/L (ref 3.5–5.1)
PROT SERPL-MCNC: 6.9 G/DL (ref 6.4–8.2)
RBC # BLD AUTO: 5.07 M/UL (ref 4.1–5.7)
SODIUM SERPL-SCNC: 139 MMOL/L (ref 136–145)
TRIGL SERPL-MCNC: 311 MG/DL
TSH SERPL DL<=0.05 MIU/L-ACNC: 1.37 UIU/ML (ref 0.36–3.74)
VLDLC SERPL CALC-MCNC: 62.2 MG/DL
WBC # BLD AUTO: 9.7 K/UL (ref 4.1–11.1)

## 2024-10-30 LAB
PSA SERPL-MCNC: 0.6 NG/ML (ref 0–4)
REFLEX CRITERIA: NORMAL

## 2025-05-02 DIAGNOSIS — J45.20 MILD INTERMITTENT ASTHMA WITHOUT COMPLICATION: ICD-10-CM

## 2025-05-02 DIAGNOSIS — K21.00 GASTROESOPHAGEAL REFLUX DISEASE WITH ESOPHAGITIS WITHOUT HEMORRHAGE: ICD-10-CM

## 2025-05-02 DIAGNOSIS — E78.2 MIXED DYSLIPIDEMIA: ICD-10-CM

## 2025-05-02 NOTE — TELEPHONE ENCOUNTER
Patient requesting refill on     Requested Prescriptions     Pending Prescriptions Disp Refills    atorvastatin (LIPITOR) 40 MG tablet [Pharmacy Med Name: Atorvastatin Calcium 40 MG Oral Tablet] 90 tablet 0     Sig: Take 1 tablet by mouth once daily    montelukast (SINGULAIR) 10 MG tablet [Pharmacy Med Name: Montelukast Sodium 10 MG Oral Tablet] 90 tablet 0     Sig: Take 1 tablet by mouth once daily    pantoprazole (PROTONIX) 40 MG tablet [Pharmacy Med Name: Pantoprazole Sodium 40 MG Oral Tablet Delayed Release] 180 tablet 0     Sig: Take 1 tablet by mouth twice daily        Last OV 10/28/2024

## 2025-05-05 RX ORDER — PANTOPRAZOLE SODIUM 40 MG/1
40 TABLET, DELAYED RELEASE ORAL 2 TIMES DAILY
Qty: 180 TABLET | Refills: 0 | Status: SHIPPED | OUTPATIENT
Start: 2025-05-05

## 2025-05-05 RX ORDER — ATORVASTATIN CALCIUM 40 MG/1
40 TABLET, FILM COATED ORAL DAILY
Qty: 90 TABLET | Refills: 0 | Status: SHIPPED | OUTPATIENT
Start: 2025-05-05

## 2025-05-05 RX ORDER — MONTELUKAST SODIUM 10 MG/1
10 TABLET ORAL DAILY
Qty: 90 TABLET | Refills: 0 | Status: SHIPPED | OUTPATIENT
Start: 2025-05-05

## 2025-07-30 DIAGNOSIS — K21.00 GASTROESOPHAGEAL REFLUX DISEASE WITH ESOPHAGITIS WITHOUT HEMORRHAGE: ICD-10-CM

## 2025-07-30 DIAGNOSIS — J45.20 MILD INTERMITTENT ASTHMA WITHOUT COMPLICATION: ICD-10-CM

## 2025-07-31 RX ORDER — MONTELUKAST SODIUM 10 MG/1
10 TABLET ORAL DAILY
Qty: 90 TABLET | Refills: 0 | Status: SHIPPED | OUTPATIENT
Start: 2025-07-31

## 2025-07-31 RX ORDER — PANTOPRAZOLE SODIUM 40 MG/1
40 TABLET, DELAYED RELEASE ORAL 2 TIMES DAILY
Qty: 180 TABLET | Refills: 0 | Status: SHIPPED | OUTPATIENT
Start: 2025-07-31

## (undated) DEVICE — PRESSURE MONITORING SET: Brand: TRUWAVE

## (undated) DEVICE — CATH EP ACHV MPPNG 3.3FR 20MM -- ACHIEVE

## (undated) DEVICE — KIT ACCS INTRO 4FR L10CM NDL 21GA L7CM GWIRE L40CM

## (undated) DEVICE — PACK PROC PACEMAKER  LF

## (undated) DEVICE — DERMABOND SKIN ADH 0.7ML -- DERMABOND ADVANCED 12/BX

## (undated) DEVICE — REM POLYHESIVE ADULT PATIENT RETURN ELECTRODE: Brand: VALLEYLAB

## (undated) DEVICE — CATH CRYOABLATN EP 28MM -- ARCTIC FRONT

## (undated) DEVICE — PINNACLE INTRODUCER SHEATH: Brand: PINNACLE

## (undated) DEVICE — INTRO PEELWY HEMVLV 7F 13CM -- SHRT PRELUDE SNAP

## (undated) DEVICE — MEDI-TRACE CADENCE ADULT, DEFIBRILLATION ELECTRODE -RTS  (10 PR/PK) - PHILIPS: Brand: MEDI-TRACE CADENCE

## (undated) DEVICE — AMPLATZ EXTRA STIFF WIRE GUIDE: Brand: AMPLATZ

## (undated) DEVICE — SOLID-TIP ABLATION CATHETER CABLE, STERILE CABLE: Brand: BLAZER™

## (undated) DEVICE — Device: Brand: ACUNAV 10F ULTRASOUND CATHETER

## (undated) DEVICE — SUT SLK 0 30IN SH BLK --

## (undated) DEVICE — KIT ELECTRD SURF FOR DISPLAYING THE 3D POS OF EP CATH

## (undated) DEVICE — SUTURE V-LOC 180 SZ 2-0 L12IN ABSRB VLT GS-21 L37MM 1/2 CIR VLOCM0315

## (undated) DEVICE — KIT COAX UMBILICAL FOR N20 --

## (undated) DEVICE — VALVE INSRT TOOL ADPT MTL 9FR -- ACCESS

## (undated) DEVICE — ANGIOGRAPHY KIT CUST [K0910930B] [MERIT MEDICAL SYSTEMS INC]

## (undated) DEVICE — CABLE RMFG E SUPREME 150CM RED --

## (undated) DEVICE — Device

## (undated) DEVICE — TRAY,IRRIGATION,PISTON SYRINGE,60ML,STRL: Brand: MEDLINE

## (undated) DEVICE — SHTH STEERABLE FLEXCATH 12 FR --

## (undated) DEVICE — PACK PROCEDURE SURG HRT CATH

## (undated) DEVICE — TEMPERATURE ABLATION CATHETER: Brand: BLAZER® II HTD

## (undated) DEVICE — INTRO SHTH 9FR 13X20CM -- USE ITEM# 341577

## (undated) DEVICE — 3M™ IOBAN™ 2 ANTIMICROBIAL INCISE DRAPE 6650EZ: Brand: IOBAN™ 2

## (undated) DEVICE — Device: Brand: NRG TRANSSEPTAL NEEDLE

## (undated) DEVICE — CATH RMFG DECA DUO 7F2/8 95S --

## (undated) DEVICE — PROBE ES TEMP HOT AND CLD FAST ACCURATE SFT FLX CIRCA S CATH

## (undated) DEVICE — DRESSING HEMOSTATIC SFT INTVENT W/O SLT DBL WRP QUIKCLOT LF

## (undated) DEVICE — ABSORBABLE WOUND CLOSURE DEVICE: Brand: V-LOC 90

## (undated) DEVICE — CABLE CATH CONN 10 PIN DISP -- ACHIEVE ADVANCE

## (undated) DEVICE — CATH QUAD 6F 2/5/2 120CM JSN --

## (undated) DEVICE — KIT ELECTRICAL UMBILICAL --

## (undated) DEVICE — STERILE (15.2 TAPERED TO 7.6 X 183CM) POLYETHYLENE ACCORDION-FOLDED COVER FOR USE WITH SIEMENS ACUNAV ULTRASOUND CATHETER FAMILY CONNECTOR: Brand: SWIFTLINK TRANSDUCER COVER

## (undated) DEVICE — DECANTER BAG 9": Brand: MEDLINE INDUSTRIES, INC.

## (undated) DEVICE — TUBING PRSS MON L6IN PVC M FEM CONN